# Patient Record
Sex: FEMALE | Race: WHITE | NOT HISPANIC OR LATINO | Employment: OTHER | URBAN - METROPOLITAN AREA
[De-identification: names, ages, dates, MRNs, and addresses within clinical notes are randomized per-mention and may not be internally consistent; named-entity substitution may affect disease eponyms.]

---

## 2017-01-11 ENCOUNTER — OFFICE VISIT (OUTPATIENT)
Dept: URGENT CARE | Facility: CLINIC | Age: 72
End: 2017-01-11
Payer: MEDICARE

## 2017-01-11 VITALS
HEIGHT: 63 IN | TEMPERATURE: 96.9 F | DIASTOLIC BLOOD PRESSURE: 78 MMHG | SYSTOLIC BLOOD PRESSURE: 124 MMHG | BODY MASS INDEX: 35.83 KG/M2 | OXYGEN SATURATION: 94 % | WEIGHT: 202.2 LBS | HEART RATE: 96 BPM | RESPIRATION RATE: 16 BRPM

## 2017-01-11 DIAGNOSIS — J22 ACUTE LOWER RESPIRATORY TRACT INFECTION: ICD-10-CM

## 2017-01-11 PROCEDURE — 99214 OFFICE O/P EST MOD 30 MIN: CPT | Performed by: NURSE PRACTITIONER

## 2017-01-11 RX ORDER — LEVOFLOXACIN 500 MG/1
500 TABLET, FILM COATED ORAL DAILY
Qty: 7 TAB | Refills: 0 | Status: SHIPPED | OUTPATIENT
Start: 2017-01-11 | End: 2017-01-18

## 2017-01-11 NOTE — PROGRESS NOTES
Chief Complaint   Patient presents with   • Cough     cough/ nasal congestion X 2 days       HISTORY OF PRESENT ILLNESS: Patient is a 71 y.o. female who presents today due to symptoms that started over one week ago but has been worsening over the past two days. Pt reports a productive cough without blood in sputum. Reports associated mild sore throat, nasal congestion, fatigue, mild shortness of breath, and body aches. Denies chest pain, fever, or wheezing. Denies h/o asthma/copd. Admits to history of CAP 5 years ago, is a former smoker. Has tried OTC cold medications without significant relief of symptoms. No recent ABX use. No other aggravating or alleviating factors.     Patient Active Problem List    Diagnosis Date Noted   • Painful urination 09/26/2016   • CKD (chronic kidney disease) stage 3, GFR 30-59 ml/min 08/01/2016   • Fatigue 08/01/2016   • Osteopenia 08/01/2016   • Vitamin B12 deficiency 08/01/2016   • Varicose veins 08/01/2016   • Seasonal allergies 08/01/2016   • UI (urinary incontinence) 08/01/2016   • Vitamin D deficiency 08/01/2016   • Epilepsy (HCC) 08/01/2016       Allergies:Review of patient's allergies indicates no known allergies.    Current Outpatient Prescriptions Ordered in [facility]   Medication Sig Dispense Refill   • Pseudoephedrine-Guaifenesin (MUCINEX D PO) Take  by mouth.     • phenytoin ER (DILANTIN) 100 MG Cap Take 4 Caps by mouth every day. 120 Cap 3   • fluticasone (FLONASE) 50 MCG/ACT nasal spray Spray 1 Spray in nose every day.     • Multiple Vitamin (MULTI-VITAMINS PO) Take  by mouth.     • Cholecalciferol (VITAMIN D PO) Take  by mouth.     • Cyanocobalamin (VITAMIN B12 PO) Take  by mouth.     • CALCIUM PO Take  by mouth.       No current Epic-ordered facility-administered medications on file.       Past Medical History   Diagnosis Date   • Seizure disorder    • CKD (chronic kidney disease) stage 3, GFR 30-59 ml/min 8/1/2016   • Osteopenia 8/1/2016   • Vitamin B12 deficiency  "2016   • Varicose veins 2016   • Seasonal allergies 2016   • UI (urinary incontinence) 2016   • Vitamin D deficiency 2016   • Epilepsy 2016       Social History   Substance Use Topics   • Smoking status: Never Smoker    • Smokeless tobacco: Never Used   • Alcohol Use: No       Family Status   Relation Status Death Age   • Sister Alive    • Father  90     LUNG CANCER   • Mother  39     STOMACH CANCER   • Sister  80     HEART ISSUES   • Brother  65     PANCREATIC CANCER     Family History   Problem Relation Age of Onset   • Breast Cancer Sister    • Other Paternal Aunt      EPILEPSY   • Lung Cancer Father    • Cancer Mother      STOMACH   • Heart Disease Sister    • Cancer Brother      PANCREATIC       ROS:  Review of Systems   Constitutional: Positive for subjective malaise, chills, fatigue. Negative for weight loss and fever.  HENT: Positive for congestion and sore throat. Negative for ear pain, nosebleeds, and neck pain.    Eyes: Negative for vision changes.   Cardiovascular: Negative for chest pain, palpitations, orthopnea and leg swelling.   Respiratory: Positive for cough with sputum production, shortness of breath. Negative for wheezing.   Gastrointestinal: Negative for abdominal pain, nausea, vomiting or diarrhea.   Skin: Negative for rash, diaphoresis.     Exam:  Blood pressure 124/78, pulse 96, temperature 36.1 °C (96.9 °F), resp. rate 16, height 1.6 m (5' 3\"), weight 91.717 kg (202 lb 3.2 oz), SpO2 94 %.  General: well-nourished, well-developed female in NAD  Head: normocephalic, atraumatic  Eyes: PERRLA, EOM within normal limits, no conjunctival injection, no scleral icterus, visual fields and acuity grossly intact.  Ears: normal shape and symmetry, no tenderness, no discharge. External canals are without any significant edema or erythema. Tympanic membranes are without any inflammation, no effusion. Gross auditory acuity is intact.  Nose: symmetrical " without tenderness, mild discharge, erythema present bilateral nares.  Mouth/Throat: reasonable hygiene, no exudates or tonsillar enlargement. Erythema present.   Neck: no masses, range of motion within normal limits, no tracheal deviation.  Lymph: mild cervical adenopathy. No supraclavicular adenopathy.   Neuro: alert and oriented. Cranial nerves 1-12 grossly intact.   Cardiovascular: regular rate and rhythm without murmurs, rubs, or gallops. No edema.   Pulmonary: no distress. Chest is symmetrical with respiration, no wheezes, crackles. Rhonchi RLL.   Musculoskeletal: appropriate muscle tone, gait is stable.  Skin: warm, dry, intact, no clubbing, no cyanosis.   Psych: appropriate mood, affect, judgement.         Assessment/Plan:  1. Acute lower respiratory tract infection  levofloxacin (LEVAQUIN) 500 MG tablet           I have offered the patient a chest x-ray today, but she has declined. Levaquin for suspected lower resp tract infection.   Rest, increase fluids, hand and respiratory hygiene.   Supportive care, differential diagnoses, and indications for immediate follow-up discussed with patient.   Pathogenesis of diagnosis discussed including typical length and natural progression.  Instructed to return to clinic or nearest emergency department for any change in condition, further concerns, or worsening of symptoms.  Patient states understanding of the plan of care and discharge instructions.  Instructed to make an appointment with their primary care provider in the next 3-7 days if not significantly improving and for further care.         Please note that this dictation was created using voice recognition software. I have made every reasonable attempt to correct obvious errors, but I expect that there are errors of grammar and possibly content that I did not discover before finalizing the note.      HUSSEIN Underwood.

## 2017-01-11 NOTE — MR AVS SNAPSHOT
"        Nancie Joyner   2017 8:15 AM   Office Visit   MRN: [id number]    Department:  [facility]   Dept Phone:  [phone]    Description:  Female : 1945   Provider:  MOUNA Cuadra           Reason for Visit     Cough cough/ nasal congestion X 2 days      Allergies as of 2017     No Known Allergies      You were diagnosed with     Acute lower respiratory tract infection   [496572]         Vital Signs     Blood Pressure Pulse Temperature Respirations Height Weight    124/78 mmHg 96 36.1 °C (96.9 °F) 16 1.6 m (5' 3\") 91.717 kg (202 lb 3.2 oz)    Body Mass Index Oxygen Saturation Smoking Status             35.83 kg/m2 94% Never Smoker          Basic Information     Date Of Birth Sex Race Ethnicity Preferred Language    1945 Female White Non- English      Your appointments     2017  8:00 AM   New Patient with Ioana Denise D.O.   [facility]    [address] [place] [id number] [phone]           Please bring Photo ID, Insurance Cards, All Medication Bottles and copies of any legal documents (such as Living Will, Power of ) If speaking a language besides English please bring an adult . Please arrive 30 minutes prior for check in and registration. You will be receiving a confirmation call a few days before your appointment from our automated call confirmation system.              Problem List              ICD-10-CM Priority Class Noted - Resolved    CKD (chronic kidney disease) stage 3, GFR 30-59 ml/min N18.3   2016 - Present    Fatigue R53.83   2016 - Present    Osteopenia M85.80   2016 - Present    Vitamin B12 deficiency E53.8   2016 - Present    Varicose veins I86.8   2016 - Present    Seasonal allergies J30.2   2016 - Present    UI (urinary incontinence) R32   2016 - Present    Vitamin D deficiency E55.9   2016 - Present    Epilepsy (HCC) G40.909   2016 - Present   " Painful urination R30.9   9/26/2016 - Present      Health Maintenance        Date Due Completion Dates    IMM DTaP/Tdap/Td Vaccine (1 - Tdap) 8/14/1962 ---    PAP SMEAR 8/14/1964 ---    COLONOSCOPY 8/14/1993 ---    IMM ZOSTER VACCINE 8/14/2003 ---    IMM PNEUMOCOCCAL 65+ (ADULT) LOW/MEDIUM RISK SERIES (1 of 2 - PCV13) 8/14/2008 ---    BONE DENSITY 11/7/2010 11/7/2005    IMM INFLUENZA (1) 9/1/2016 ---    MAMMOGRAM 7/28/2017 7/28/2016, 6/15/2015, 6/4/2014, 5/28/2013, 5/17/2012, 4/28/2011, 2/5/2010, 2/5/2010, 1/13/2009, 1/13/2009, 10/3/2006, 9/13/2005, 9/2/2004            Current Immunizations     No immunizations on file.      Below and/or attached are the medications your provider expects you to take. Review all of your home medications and newly ordered medications with your provider and/or pharmacist. Follow medication instructions as directed by your provider and/or pharmacist. Please keep your medication list with you and share with your provider. Update the information when medications are discontinued, doses are changed, or new medications (including over-the-counter products) are added; and carry medication information at all times in the event of emergency situations     Allergies:  No Known Allergies          Medications  Valid as of: January 11, 2017 -  8:34 AM    Generic Name Brand Name Tablet Size Instructions for use    Calcium   Take  by mouth.        Cholecalciferol   Take  by mouth.        Cyanocobalamin   Take  by mouth.        Fluticasone Propionate (Suspension) FLONASE 50 MCG/ACT Spray 1 Spray in nose every day.        Multiple Vitamin   Take  by mouth.        Phenytoin Sodium Extended (Cap) DILANTIN 100 MG Take 4 Caps by mouth every day.        Pseudoephedrine-Guaifenesin   Take  by mouth.        .                 Medicines prescribed today were sent to:     [facility] - [place], [place] - [address]    [address] [place] [address]    Phone: [phone] Fax: [phone]    Open 24 Hours?: No      [facility] - [place], [place] - [address] [facility]    [address] [facility] [address]    Phone: [phone] Fax: [phone]    Open 24 Hours?: No      Medication refill instructions:       If your prescription bottle indicates you have medication refills left, it is not necessary to call your provider’s office. Please contact your pharmacy and they will refill your medication.    If your prescription bottle indicates you do not have any refills left, you may request refills at any time through one of the following ways: The online [employer] system (except Urgent Care), by calling your provider’s office, or by asking your pharmacy to contact your provider’s office with a refill request. Medication refills are processed only during regular business hours and may not be available until the next business day. Your provider may request additional information or to have a follow-up visit with you prior to refilling your medication.   *Please Note: Medication refills are assigned a new Rx number when refilled electronically. Your pharmacy may indicate that no refills were authorized even though a new prescription for the same medication is available at the pharmacy. Please request the medicine by name with the pharmacy before contacting your provider for a refill.           MyChart Status: Patient Declined

## 2017-02-02 ENCOUNTER — OFFICE VISIT (OUTPATIENT)
Dept: MEDICAL GROUP | Facility: PHYSICIAN GROUP | Age: 72
End: 2017-02-02
Payer: MEDICARE

## 2017-02-02 VITALS
DIASTOLIC BLOOD PRESSURE: 68 MMHG | WEIGHT: 208 LBS | TEMPERATURE: 96.9 F | OXYGEN SATURATION: 94 % | BODY MASS INDEX: 36.86 KG/M2 | HEIGHT: 63 IN | HEART RATE: 74 BPM | SYSTOLIC BLOOD PRESSURE: 124 MMHG

## 2017-02-02 DIAGNOSIS — E55.9 VITAMIN D DEFICIENCY: ICD-10-CM

## 2017-02-02 DIAGNOSIS — G40.909 NONINTRACTABLE EPILEPSY WITHOUT STATUS EPILEPTICUS, UNSPECIFIED EPILEPSY TYPE (HCC): ICD-10-CM

## 2017-02-02 DIAGNOSIS — N18.30 CKD (CHRONIC KIDNEY DISEASE) STAGE 3, GFR 30-59 ML/MIN (HCC): ICD-10-CM

## 2017-02-02 DIAGNOSIS — Z78.0 MENOPAUSE: ICD-10-CM

## 2017-02-02 DIAGNOSIS — M85.80 OSTEOPENIA: ICD-10-CM

## 2017-02-02 PROCEDURE — 1036F TOBACCO NON-USER: CPT | Performed by: FAMILY MEDICINE

## 2017-02-02 PROCEDURE — G8432 DEP SCR NOT DOC, RNG: HCPCS | Performed by: FAMILY MEDICINE

## 2017-02-02 PROCEDURE — 0518F FALL PLAN OF CARE DOCD: CPT | Mod: 8P | Performed by: FAMILY MEDICINE

## 2017-02-02 PROCEDURE — 3288F FALL RISK ASSESSMENT DOCD: CPT | Performed by: FAMILY MEDICINE

## 2017-02-02 PROCEDURE — 99214 OFFICE O/P EST MOD 30 MIN: CPT | Performed by: FAMILY MEDICINE

## 2017-02-02 PROCEDURE — 1100F PTFALLS ASSESS-DOCD GE2>/YR: CPT | Performed by: FAMILY MEDICINE

## 2017-02-02 PROCEDURE — 4040F PNEUMOC VAC/ADMIN/RCVD: CPT | Performed by: FAMILY MEDICINE

## 2017-02-02 PROCEDURE — 3017F COLORECTAL CA SCREEN DOC REV: CPT | Mod: 8P | Performed by: FAMILY MEDICINE

## 2017-02-02 PROCEDURE — 3014F SCREEN MAMMO DOC REV: CPT | Performed by: FAMILY MEDICINE

## 2017-02-02 PROCEDURE — G8419 CALC BMI OUT NRM PARAM NOF/U: HCPCS | Performed by: FAMILY MEDICINE

## 2017-02-02 PROCEDURE — G8482 FLU IMMUNIZE ORDER/ADMIN: HCPCS | Performed by: FAMILY MEDICINE

## 2017-02-02 RX ORDER — PHENYTOIN SODIUM 100 MG/1
400 CAPSULE, EXTENDED RELEASE ORAL DAILY
Qty: 360 CAP | Refills: 3 | Status: SHIPPED | OUTPATIENT
Start: 2017-02-02 | End: 2017-10-10 | Stop reason: SDUPTHER

## 2017-02-02 NOTE — MR AVS SNAPSHOT
"        Nancie Joyner   2017 8:00 AM   Office Visit   MRN: [id number]    Department:  [facility]   Dept Phone:  [phone]    Description:  Female : 1945   Provider:  Ioana Denise D.O.           Reason for Visit     Establish Care           Allergies as of 2017     No Known Allergies      You were diagnosed with     Nonintractable epilepsy without status epilepticus, unspecified epilepsy type (CMS-HCC)   [3877283]       CKD (chronic kidney disease) stage 3, GFR 30-59 ml/min   [934805]       Osteopenia   [171589]       Vitamin D deficiency   [5879993]       Menopause   [100474]         Vital Signs     Blood Pressure Pulse Temperature Height Weight Body Mass Index    124/68 mmHg 74 36.1 °C (96.9 °F) 1.6 m (5' 3\") 94.348 kg (208 lb) 36.85 kg/m2    Oxygen Saturation Smoking Status                94% Former Smoker          Basic Information     Date Of Birth Sex Race Ethnicity Preferred Language    1945 Female White Non- English      Your appointments     Oct 09, 2017  8:20 AM   Established Patient with Ioana Denise D.O.   [facility]    [address] [place] [id number] [phone]           You will be receiving a confirmation call a few days before your appointment from our automated call confirmation system.              Problem List              ICD-10-CM Priority Class Noted - Resolved    CKD (chronic kidney disease) stage 3, GFR 30-59 ml/min N18.3   2016 - Present    Fatigue R53.83   2016 - Present    Osteopenia M85.80   2016 - Present    Vitamin B12 deficiency E53.8   2016 - Present    Varicose veins I86.8   2016 - Present    Seasonal allergies J30.2   2016 - Present    UI (urinary incontinence) R32   2016 - Present    Vitamin D deficiency E55.9   2016 - Present    Epilepsy (CMS-HCC) G40.909   2016 - Present    Painful urination R30.9   2016 - Present      Health Maintenance        Date Due " Completion Dates    IMM DTaP/Tdap/Td Vaccine (1 - Tdap) 8/14/1962 ---    COLONOSCOPY 8/14/1993 ---    BONE DENSITY 11/7/2010 11/7/2005    MAMMOGRAM 7/28/2017 7/28/2016, 6/15/2015, 6/4/2014, 5/28/2013, 5/17/2012, 4/28/2011, 2/5/2010, 2/5/2010, 1/13/2009, 1/13/2009, 10/3/2006, 9/13/2005, 9/2/2004    IMM PNEUMOCOCCAL 65+ (ADULT) LOW/MEDIUM RISK SERIES (2 of 2 - PPSV23) 11/30/2017 11/30/2016            Current Immunizations     13-VALENT PCV PREVNAR 11/30/2016    Influenza Vaccine Adult HD 9/14/2016, 10/14/2015    Influenza Vaccine Quad Inj (Pf) 10/2/2012    Influenza Vaccine Quad Inj (Preserved) 9/25/2014, 10/25/2013, 10/5/2011    SHINGLES VACCINE 11/30/2016      Below and/or attached are the medications your provider expects you to take. Review all of your home medications and newly ordered medications with your provider and/or pharmacist. Follow medication instructions as directed by your provider and/or pharmacist. Please keep your medication list with you and share with your provider. Update the information when medications are discontinued, doses are changed, or new medications (including over-the-counter products) are added; and carry medication information at all times in the event of emergency situations     Allergies:  No Known Allergies          Medications  Valid as of: February 02, 2017 -  8:21 AM    Generic Name Brand Name Tablet Size Instructions for use    Calcium   Take  by mouth.        Cholecalciferol   Take  by mouth.        Cyanocobalamin   Take  by mouth.        Fluticasone Propionate (Suspension) FLONASE 50 MCG/ACT Spray 1 Spray in nose every day.        Multiple Vitamin   Take  by mouth.        Phenytoin Sodium Extended (Cap) DILANTIN 100 MG Take 4 Caps by mouth every day.        .                 Medicines prescribed today were sent to:     [facility] - [place], [place] - [address]    [address] [place] [address]    Phone: [phone] Fax: [phone]    Open 24 Hours?: No    [facility] - [place], [place] - [address] [facility]    [address] [facility] [address]    Phone: [phone] Fax: [phone]    Open 24 Hours?: No      Medication refill instructions:       If your prescription bottle indicates you have medication refills left, it is not necessary to call your provider’s office. Please contact your pharmacy and they will refill your medication.    If your prescription bottle indicates you do not have any refills left, you may request refills at any time through one of the following ways: The online [employer] system (except Urgent Care), by calling your provider’s office, or by asking your pharmacy to contact your provider’s office with a refill request. Medication refills are processed only during regular business hours and may not be available until the next business day. Your provider may request additional information or to have a follow-up visit with you prior to refilling your medication.   *Please Note: Medication refills are assigned a new Rx number when refilled electronically. Your pharmacy may indicate that no refills were authorized even though a new prescription for the same medication is available at the pharmacy. Please request the medicine by name with the pharmacy before contacting your provider for a refill.        Your To Do List     Future Labs/Procedures Complete By Expires    COMP METABOLIC PANEL  As directed 2/3/2018    DILANTIN  As directed 2/2/2018    DS-BONE DENSITY STUDY (DEXA)  As directed 8/5/2017    VITAMIN D,25 HYDROXY  As directed 2/3/2018         pope[employer]pert Status: Patient Declined

## 2017-02-02 NOTE — PROGRESS NOTES
Subjective:   Nancie Joyner is a 71 y.o. female here today for epilepsy; osteopenia, low vit D; CKD    CKD (chronic kidney disease) stage 3, GFR 30-59 ml/min  Ongoing issue. Chart review shows that last blood work revealed an improvement in her GFR to 54. Evaluation of her current medications do not reveal any nephrotoxic medications. Patient also reports that she only uses ibuprofen infrequently for management of joint pain. She reports that usage is approximately once or twice a month.    Epilepsy  Long-standing history. Patient reports 100% compliance with medication; she is currently on Dilantin 400 mg daily. Patient states that she does not remember the last time she had a seizure.  is present and is questioning whether or not she needs to have her Dilantin level checked.    Osteopenia  Long-standing history. Patient currently is taking supplemental calcium and vitamin D. She denies any long bone or vertebral fractures. Patient reports that she cannot remember the last time she had a bone scan.    Vitamin D deficiency  Ongoing issue. Patient currently is taking supplemental vitamin D. Chart review shows the last vitamin D level was at 31 which is below level of 40 which is considered therapeutic.     Pt is here today to [facility]; she is present today with her .     Current medicines (including changes today)  Current Outpatient Prescriptions   Medication Sig Dispense Refill   • phenytoin ER (DILANTIN) 100 MG Cap Take 4 Caps by mouth every day. 360 Cap 3   • fluticasone (FLONASE) 50 MCG/ACT nasal spray Spray 1 Spray in nose every day.     • Multiple Vitamin (MULTI-VITAMINS PO) Take  by mouth.     • Cholecalciferol (VITAMIN D PO) Take  by mouth.     • Cyanocobalamin (VITAMIN B12 PO) Take  by mouth.     • CALCIUM PO Take  by mouth.       No current facility-administered medications for this visit.     She  has a past medical history of Seizure disorder (CMS-[facility]); CKD (chronic kidney disease) stage  "3, GFR 30-59 ml/min (8/1/2016); Osteopenia (8/1/2016); Vitamin B12 deficiency (8/1/2016); Varicose veins (8/1/2016); Seasonal allergies (8/1/2016); UI (urinary incontinence) (8/1/2016); Vitamin D deficiency (8/1/2016); and Epilepsy (CMS-HCC) (8/1/2016).    ROS   No chest pain, no shortness of breath, no abdominal pain       Objective:     Blood pressure 124/68, pulse 74, temperature 36.1 °C (96.9 °F), height 1.6 m (5' 3\"), weight 94.348 kg (208 lb), SpO2 94 %. Body mass index is 36.85 kg/(m^2).   Physical Exam:  Alert, oriented in no acute distress.  Eye contact is good, speech goal directed, affect calm  HEENT: conjunctiva non-injected, sclera non-icteric.  Pinna normal. TM pearly gray.   Oral mucous membranes pink and moist with no lesions.  Neck No adenopathy or masses in the neck or supraclavicular regions.  Lungs: clear to auscultation bilaterally with good excursion.  CV: regular rate and rhythm. Mild systolic ejection murmur noted  Abdomen: soft, nontender, No CVAT; obese  Ext: no edema, color normal, vascularity normal, temperature normal  Neuro: CN 2-12 grossly intact      Assessment and Plan:   The following treatment plan was discussed     1. Nonintractable epilepsy without status epilepticus, unspecified epilepsy type (CMS-HCC)  DILANTIN    Stable. Continue current medications; refill provided. Check Dilantin levels. Monitor   2. CKD (chronic kidney disease) stage 3, GFR 30-59 ml/min  COMP METABOLIC PANEL    Stable. Recheck GFR. Monitor for results   3. Osteopenia      Stable. Continue current supplementation; sent for repeat DEXA scan. Monitor for results   4. Vitamin D deficiency  VITAMIN D,25 HYDROXY    Uncontrolled. Continue current supplementation; recheck labs and monitor for results   5. Menopause  DS-BONE DENSITY STUDY (DEXA)    Stable. Continue current supplementation; sent for repeat DEXA scan. Monitor for results       Followup: Return in about 7 months (around 9/2/2017) for seizure, " Short.

## 2017-02-02 NOTE — ASSESSMENT & PLAN NOTE
Ongoing issue. Patient currently is taking supplemental vitamin D. Chart review shows the last vitamin D level was at 31 which is below level of 40 which is considered therapeutic.

## 2017-02-02 NOTE — ASSESSMENT & PLAN NOTE
Long-standing history. Patient currently is taking supplemental calcium and vitamin D. She denies any long bone or vertebral fractures. Patient reports that she cannot remember the last time she had a bone scan.

## 2017-02-02 NOTE — ASSESSMENT & PLAN NOTE
Long-standing history. Patient reports 100% compliance with medication; she is currently on Dilantin 400 mg daily. Patient states that she does not remember the last time she had a seizure.  is present and is questioning whether or not she needs to have her Dilantin level checked.

## 2017-02-02 NOTE — ASSESSMENT & PLAN NOTE
Ongoing issue. Chart review shows that last blood work revealed an improvement in her GFR to 54. Evaluation of her current medications do not reveal any nephrotoxic medications. Patient also reports that she only uses ibuprofen infrequently for management of joint pain. She reports that usage is approximately once or twice a month.

## 2017-03-03 ENCOUNTER — HOSPITAL ENCOUNTER (OUTPATIENT)
Dept: RADIOLOGY | Facility: MEDICAL CENTER | Age: 72
End: 2017-03-03
Attending: FAMILY MEDICINE
Payer: MEDICARE

## 2017-03-03 ENCOUNTER — TELEPHONE (OUTPATIENT)
Dept: MEDICAL GROUP | Facility: PHYSICIAN GROUP | Age: 72
End: 2017-03-03

## 2017-03-03 DIAGNOSIS — Z78.0 MENOPAUSE: ICD-10-CM

## 2017-03-03 PROCEDURE — 77080 DXA BONE DENSITY AXIAL: CPT

## 2017-03-03 NOTE — Clinical Note
March 3, 2017         Nancie Joyner  [address]  [place] [address]        Dear Nancie:      Below are the results from your recent visit:    Resulted Orders   DS-BONE DENSITY STUDY (DEXA)    Narrative    3/3/2017 12:33 PM    HISTORY/REASON FOR EXAM:  Postmenopausal, osteopenia    TECHNIQUE/EXAM DESCRIPTION AND NUMBER OF VIEWS:  Dual x-ray bone densitometry was performed from the L1 through L4 levels and from the proximal left femur utilizing the GE Prodigy unit.    COMPARISON:   None    FINDINGS:  The mean bone mineral density for the lumbar spine is 1.183 g/cm2, which corresponds to a T score of 0.0 and a Z score of 1.8.    The proximal left femur has a mean bone mineral density of 1.065 g/cm2, with a T score of 0.5 and a Z score of 2.1.      Impression    According to the World Health Organization classification, bone mineral density of this patient is normal.        10-year Probability of Fracture:  Major Osteoporotic     9.2%  Hip     1.3%  Population      [place] ()    Based on left femur neck BMD          INTERPRETING LOCATION:  [address] [place] [address], [address]     The test results show that the DEXA (bone) scan showed normal bone density.  Recommend repeat in 2 years.   Please continue all current medications/supplements for now. .    If you have any questions or concerns, please don't hesitate to call.        Sincerely,      Ioana Denise D.O.    Electronically Signed

## 2017-03-03 NOTE — TELEPHONE ENCOUNTER
----- Message from Ioana Denise D.O. sent at 3/3/2017  2:02 PM PST -----  Please advise pt that the DEXA (bone) scan showed normal bone density.  Recommend repeat in 2 years.  Please continue all current medications/supplements for now.    Ioana Denise D.O.

## 2017-05-16 ENCOUNTER — HOSPITAL ENCOUNTER (OUTPATIENT)
Dept: LAB | Facility: MEDICAL CENTER | Age: 72
End: 2017-05-16
Attending: FAMILY MEDICINE
Payer: MEDICARE

## 2017-05-16 DIAGNOSIS — E55.9 VITAMIN D DEFICIENCY: ICD-10-CM

## 2017-05-16 DIAGNOSIS — G40.909 NONINTRACTABLE EPILEPSY WITHOUT STATUS EPILEPTICUS, UNSPECIFIED EPILEPSY TYPE (HCC): ICD-10-CM

## 2017-05-16 DIAGNOSIS — N18.30 CKD (CHRONIC KIDNEY DISEASE) STAGE 3, GFR 30-59 ML/MIN (HCC): ICD-10-CM

## 2017-05-16 LAB
25(OH)D3 SERPL-MCNC: 33 NG/ML (ref 30–100)
ALBUMIN SERPL BCP-MCNC: 4.2 G/DL (ref 3.2–4.9)
ALBUMIN/GLOB SERPL: 1.4 G/DL
ALP SERPL-CCNC: 76 U/L (ref 30–99)
ALT SERPL-CCNC: 28 U/L (ref 2–50)
ANION GAP SERPL CALC-SCNC: 6 MMOL/L (ref 0–11.9)
AST SERPL-CCNC: 23 U/L (ref 12–45)
BILIRUB SERPL-MCNC: 0.3 MG/DL (ref 0.1–1.5)
BUN SERPL-MCNC: 19 MG/DL (ref 8–22)
CALCIUM SERPL-MCNC: 9.3 MG/DL (ref 8.5–10.5)
CHLORIDE SERPL-SCNC: 106 MMOL/L (ref 96–112)
CO2 SERPL-SCNC: 28 MMOL/L (ref 20–33)
CREAT SERPL-MCNC: 1.01 MG/DL (ref 0.5–1.4)
GFR SERPL CREATININE-BSD FRML MDRD: 54 ML/MIN/1.73 M 2
GLOBULIN SER CALC-MCNC: 3.1 G/DL (ref 1.9–3.5)
GLUCOSE SERPL-MCNC: 86 MG/DL (ref 65–99)
PHENYTOIN SERPL-MCNC: 8.9 UG/ML (ref 10–20)
POTASSIUM SERPL-SCNC: 4.6 MMOL/L (ref 3.6–5.5)
PROT SERPL-MCNC: 7.3 G/DL (ref 6–8.2)
SODIUM SERPL-SCNC: 140 MMOL/L (ref 135–145)

## 2017-05-16 PROCEDURE — 36415 COLL VENOUS BLD VENIPUNCTURE: CPT

## 2017-05-16 PROCEDURE — 80185 ASSAY OF PHENYTOIN TOTAL: CPT

## 2017-05-16 PROCEDURE — 80053 COMPREHEN METABOLIC PANEL: CPT

## 2017-05-16 PROCEDURE — 82306 VITAMIN D 25 HYDROXY: CPT

## 2017-05-22 ENCOUNTER — OFFICE VISIT (OUTPATIENT)
Dept: MEDICAL GROUP | Facility: PHYSICIAN GROUP | Age: 72
End: 2017-05-22
Payer: MEDICARE

## 2017-05-22 VITALS
BODY MASS INDEX: 36.5 KG/M2 | WEIGHT: 206 LBS | OXYGEN SATURATION: 94 % | HEIGHT: 63 IN | HEART RATE: 80 BPM | RESPIRATION RATE: 16 BRPM | TEMPERATURE: 97.7 F | DIASTOLIC BLOOD PRESSURE: 82 MMHG | SYSTOLIC BLOOD PRESSURE: 118 MMHG

## 2017-05-22 DIAGNOSIS — J30.1 SEASONAL ALLERGIC RHINITIS DUE TO POLLEN: ICD-10-CM

## 2017-05-22 DIAGNOSIS — G40.802 OTHER EPILEPSY WITHOUT STATUS EPILEPTICUS, NOT INTRACTABLE (HCC): Primary | ICD-10-CM

## 2017-05-22 PROCEDURE — G8419 CALC BMI OUT NRM PARAM NOF/U: HCPCS | Performed by: NURSE PRACTITIONER

## 2017-05-22 PROCEDURE — 1100F PTFALLS ASSESS-DOCD GE2>/YR: CPT | Performed by: NURSE PRACTITIONER

## 2017-05-22 PROCEDURE — 1036F TOBACCO NON-USER: CPT | Performed by: NURSE PRACTITIONER

## 2017-05-22 PROCEDURE — 0518F FALL PLAN OF CARE DOCD: CPT | Mod: 8P | Performed by: NURSE PRACTITIONER

## 2017-05-22 PROCEDURE — 3288F FALL RISK ASSESSMENT DOCD: CPT | Performed by: NURSE PRACTITIONER

## 2017-05-22 PROCEDURE — 3014F SCREEN MAMMO DOC REV: CPT | Performed by: NURSE PRACTITIONER

## 2017-05-22 PROCEDURE — G8432 DEP SCR NOT DOC, RNG: HCPCS | Performed by: NURSE PRACTITIONER

## 2017-05-22 PROCEDURE — 4040F PNEUMOC VAC/ADMIN/RCVD: CPT | Performed by: NURSE PRACTITIONER

## 2017-05-22 PROCEDURE — 99214 OFFICE O/P EST MOD 30 MIN: CPT | Performed by: NURSE PRACTITIONER

## 2017-05-22 PROCEDURE — 3017F COLORECTAL CA SCREEN DOC REV: CPT | Mod: 8P | Performed by: NURSE PRACTITIONER

## 2017-05-22 RX ORDER — FLUTICASONE PROPIONATE 50 MCG
1-2 SPRAY, SUSPENSION (ML) NASAL DAILY
Qty: 16 G | Refills: 3 | Status: SHIPPED | OUTPATIENT
Start: 2017-05-22 | End: 2017-10-23 | Stop reason: SDUPTHER

## 2017-05-22 NOTE — MR AVS SNAPSHOT
"        Nancie Joyner   2017 11:20 AM   Office Visit   MRN: [id number]    Department:  [facility]   Dept Phone:  [phone]    Description:  Female : 1945   Provider:  LEO Ashraf           Reason for Visit     Seizure pt states she does have epilepsy    Nasal Congestion           Allergies as of 2017     No Known Allergies      You were diagnosed with     Other epilepsy without status epilepticus, not intractable   [9196036]  -  Primary     Seasonal allergic rhinitis due to pollen   [[id number]]         Vital Signs     Blood Pressure Pulse Temperature Respirations Height Weight    118/82 mmHg 80 36.5 °C (97.7 °F) 16 1.6 m (5' 3\") 93.441 kg (206 lb)    Body Mass Index Oxygen Saturation Smoking Status             36.50 kg/m2 94% Former Smoker         Basic Information     Date Of Birth Sex Race Ethnicity Preferred Language    1945 Female White Non- English      Your appointments     Oct 09, 2017  8:20 AM   Established Patient with Ioana Denise D.O.   [facility]    [address] [place] [id number] [phone]           You will be receiving a confirmation call a few days before your appointment from our automated call confirmation system.              Problem List              ICD-10-CM Priority Class Noted - Resolved    CKD (chronic kidney disease) stage 3, GFR 30-59 ml/min N18.3   2016 - Present    Fatigue R53.83   2016 - Present    Osteopenia M85.80   2016 - Present    Vitamin B12 deficiency E53.8   2016 - Present    Varicose veins I86.8   2016 - Present    Seasonal allergies J30.2   2016 - Present    UI (urinary incontinence) R32   2016 - Present    Vitamin D deficiency E55.9   2016 - Present    Epilepsy (CMS-HCC) G40.909   2016 - Present      Health Maintenance        Date Due Completion Dates    IMM DTaP/Tdap/Td Vaccine (1 - Tdap) 1962 ---    COLONOSCOPY 1993 ---    MAMMOGRAM " 7/28/2017 7/28/2016, 6/15/2015, 6/4/2014, 5/28/2013, 5/17/2012, 4/28/2011, 2/5/2010, 2/5/2010, 1/13/2009, 1/13/2009, 10/3/2006, 9/13/2005, 9/2/2004    IMM PNEUMOCOCCAL 65+ (ADULT) LOW/MEDIUM RISK SERIES (2 of 2 - PPSV23) 11/30/2017 11/30/2016    BONE DENSITY 3/3/2022 3/3/2017, 11/7/2005            Current Immunizations     13-VALENT PCV PREVNAR 11/30/2016    Influenza Vaccine Adult HD 9/14/2016, 10/14/2015    Influenza Vaccine Quad Inj (Pf) 10/2/2012    Influenza Vaccine Quad Inj (Preserved) 9/25/2014, 10/25/2013, 10/5/2011    SHINGLES VACCINE 11/30/2016      Below and/or attached are the medications your provider expects you to take. Review all of your home medications and newly ordered medications with your provider and/or pharmacist. Follow medication instructions as directed by your provider and/or pharmacist. Please keep your medication list with you and share with your provider. Update the information when medications are discontinued, doses are changed, or new medications (including over-the-counter products) are added; and carry medication information at all times in the event of emergency situations     Allergies:  No Known Allergies          Medications  Valid as of: May 22, 2017 - 11:27 AM    Generic Name Brand Name Tablet Size Instructions for use    Calcium   Take  by mouth.        Cholecalciferol   Take  by mouth.        Cyanocobalamin   Take  by mouth.        Fluticasone Propionate (Suspension) FLONASE 50 MCG/ACT Spray 1-2 Sprays in nose every day.        Multiple Vitamin   Take  by mouth.        Phenytoin Sodium Extended (Cap) DILANTIN 100 MG Take 4 Caps by mouth every day.        .                 Medicines prescribed today were sent to:     [id number] - [place], [place] - [address] [facility]    [address] [address]    Phone: [phone] Fax: [phone]    Open 24 Hours?: No    [facility] - [place], [place] - [address] [facility]    [address] [facility] [address]     Phone: [phone] Fax: [phone]    Open 24 Hours?: No      Medication refill instructions:       If your prescription bottle indicates you have medication refills left, it is not necessary to call your provider’s office. Please contact your pharmacy and they will refill your medication.    If your prescription bottle indicates you do not have any refills left, you may request refills at any time through one of the following ways: The online [employer] system (except Urgent Care), by calling your provider’s office, or by asking your pharmacy to contact your provider’s office with a refill request. Medication refills are processed only during regular business hours and may not be available until the next business day. Your provider may request additional information or to have a follow-up visit with you prior to refilling your medication.   *Please Note: Medication refills are assigned a new Rx number when refilled electronically. Your pharmacy may indicate that no refills were authorized even though a new prescription for the same medication is available at the pharmacy. Please request the medicine by name with the pharmacy before contacting your provider for a refill.        Your To Do List     Future Labs/Procedures Complete By Expires    DILANTIN  As directed 5/22/2018      Referral     A referral request has been sent to our patient care coordination department. Please allow 3-5 business days for us to process this request and contact you either by phone or mail. If you do not hear from us by the 5th business day, please call us at [phone].           [email or web address] Status: Patient Declined

## 2017-05-22 NOTE — PROGRESS NOTES
"Chief Complaint   Patient presents with   • Seizure     pt states she does have epilepsy   • Nasal Congestion       HISTORY OF PRESENT ILLNESS: Patient is a 71 y.o. female established patient who presents today to discuss the following.  She is accompanied by her .  She is a patient of Dr. Denise, this is her first visit with myself.    1. Other epilepsy without status epilepticus, not intractable  Patient states that she was diagnosed with epilepsy over 20 years ago.  She has been prescribed Dilantin and has been taking it for several years.  She reports \"mild seizure activity\" over the past couple of weeks.  Recent Dilantin levels reveal slightly low level at 8.9.  Patient was taking 400 mg at the time of the blood draw, however  states that they increased it to 500mg 4 days ago.  She was taking 500 mg in the past, but the dose was decreased when she lost a significant amount of weight.  The patient attributes her recent increase in seizure activity to sleep deprivation and nasal congestion.  Patient is not under the care of a neurologist.  She denies any further seizure activity level since the increase in the Dilantin.  Patient denies loss of consciousness during her seizures.  Describes an electric shock through her brain and arms that causes her to shake.    2. Seasonal allergic rhinitis due to pollen  Patient does suffer from seasonal allergies.  She reports significant nasal congestion over the past several days.  She has been using and saline rinse with only mild, temporary relief.  She has been prescribed Flonase in the past, but states that she ran out of the prescription.  She denies any significant sneezing, itchy, watery eyes or cough.    Allergies:Review of patient's allergies indicates no known allergies.    Current Outpatient Prescriptions Ordered in [facility]   Medication Sig Dispense Refill   • fluticasone (FLONASE) 50 MCG/ACT nasal spray Spray 1-2 Sprays in nose every day. 16 g 3   • " phenytoin ER (DILANTIN) 100 MG Cap Take 4 Caps by mouth every day. 360 Cap 3   • Multiple Vitamin (MULTI-VITAMINS PO) Take  by mouth.     • Cholecalciferol (VITAMIN D PO) Take  by mouth.     • Cyanocobalamin (VITAMIN B12 PO) Take  by mouth.     • CALCIUM PO Take  by mouth.       No current Epic-ordered facility-administered medications on file.       Past Medical History   Diagnosis Date   • Seizure disorder (CMS-HCC)    • CKD (chronic kidney disease) stage 3, GFR 30-59 ml/min 2016   • Osteopenia 2016   • Vitamin B12 deficiency 2016   • Varicose veins 2016   • Seasonal allergies 2016   • UI (urinary incontinence) 2016   • Vitamin D deficiency 2016   • Epilepsy (CMS-HCC) 2016       Social History   Substance Use Topics   • Smoking status: Former Smoker -- 0.50 packs/day for 2 years     Types: Cigarettes   • Smokeless tobacco: Never Used   • Alcohol Use: No       Family Status   Relation Status Death Age   • Sister Alive    • Father  90     LUNG CANCER   • Mother  39     STOMACH CANCER   • Sister Alive    • Sister  80     HEART ISSUES   • Brother  65     PANCREATIC CANCER     Family History   Problem Relation Age of Onset   • Breast Cancer Sister    • Cancer Sister      sinus   • Other Paternal Aunt      EPILEPSY   • Lung Cancer Father    • Cancer Mother      STOMACH   • Heart Disease Sister    • Cancer Brother      PANCREATIC   • Heart Disease Sister        ROS: see above    Review of Systems   Constitutional: Negative for fever, chills, weight loss and malaise/fatigue.   HENT: Negative for ear pain, nosebleeds, congestion, sore throat and neck pain.    Eyes: Negative for blurred vision.   Respiratory: Negative for cough, sputum production, shortness of breath and wheezing.    Cardiovascular: Negative for chest pain, palpitations, orthopnea and leg swelling.   Gastrointestinal: Negative for heartburn, nausea, vomiting and abdominal pain.   Genitourinary:  "Negative for dysuria, urgency and frequency.   Musculoskeletal: Negative for myalgias, back pain and joint pain.   Skin: Negative for rash and itching.   Neurological: Negative for dizziness, tingling, tremors, sensory change, focal weakness and headaches.   Endo/Heme/Allergies: Does not bruise/bleed easily.   Psychiatric/Behavioral: Negative for depression, suicidal ideas and memory loss.  The patient is not nervous/anxious and does not have insomnia.        Exam:  Blood pressure 118/82, pulse 80, temperature 36.5 °C (97.7 °F), resp. rate 16, height 1.6 m (5' 3\"), weight 93.441 kg (206 lb), SpO2 94 %.  General:  Well nourished, well developed female in NAD  Head is grossly normal.  Neck: Thyroid is not enlarged.  Pulmonary: Normal effort.   Cardiovascular: Regular rate.   Extremities: no clubbing, cyanosis, or edema.  Psych:  Mood and affect are normal.  Answering questions appropriately with good eye contact.      Please note that this dictation was created using voice recognition software. I have made every reasonable attempt to correct obvious errors, but I expect that there are errors of grammar and possibly content that I did not discover before finalizing the note.    Assessment/Plan:    1. Other epilepsy without status epilepticus, not intractable  REFERRAL TO NEUROLOGY    DILANTIN   2. Seasonal allergic rhinitis due to pollen  fluticasone (FLONASE) 50 MCG/ACT nasal spray        1.  Encourage daily use of Flonase to help reduce nasal congestion, in hopes of improving sleep quality.  2.  Continue with Dilantin at 500 mg daily, repeat Dilantin levels in 1-2 weeks.  3.  Consider establishing with a neurologist if seizure activity continues despite the increase in the Dilantin.  4.  Follow-up with primary care as scheduled, sooner if labs warrant further discussion.    "

## 2017-05-22 NOTE — Clinical Note
[facility]  Ioana Denise D.O.  [address]  [place] [address]  Fax: [phone]   Authorization for Release/Disclosure of   Protected Health Information   Name: NANCIE MCCLENDON : 1945 SSN: [id number]   Address: [address] [place] [address] Phone:    [phone] (home)    I authorize the entity listed below to release/disclose the PHI below to:   [facility]/Ioana Denise D.O. and LEO Ashraf   Provider or Entity Name:  [facility]   Address   City, State, Zip   Phone:      Fax:     Reason for request: continuity of care   Information to be released:    [X] LAST COLONOSCOPY,  including any PATH REPORT and follow-up  [  ] LAST FIT/COLOGUARD RESULT [  ] LAST DEXA  [  ] LAST MAMMOGRAM  [  ] LAST PAP  [  ] LAST LABS [  ] RETINA EXAM REPORT  [  ] IMMUNIZATION RECORDS  [  ] Release all info      [  ] Check here and initial the line next to each item to release ALL health information INCLUDING  _____ Care and treatment for drug and / or alcohol abuse  _____ HIV testing, infection status, or AIDS  _____ Genetic Testing    DATES OF SERVICE OR TIME PERIOD TO BE DISCLOSED: _____________  I understand and acknowledge that:  * This Authorization may be revoked at any time by you in writing, except if your health information has already been used or disclosed.  * Your health information that will be used or disclosed as a result of you signing this authorization could be re-disclosed by the recipient. If this occurs, your re-disclosed health information may no longer be protected by State or Federal laws.  * You may refuse to sign this Authorization. Your refusal will not affect your ability to obtain treatment.  * This Authorization becomes effective upon signing and will  on (date) __________.      If no date is indicated, this Authorization will  one (1) year from the signature date.    Name: Nancie Mcclendon     Date:     2017       PLEASE FAX REQUESTED RECORDS  BACK TO: [phone]

## 2017-06-12 ENCOUNTER — HOSPITAL ENCOUNTER (OUTPATIENT)
Dept: LAB | Facility: MEDICAL CENTER | Age: 72
End: 2017-06-12
Attending: STUDENT IN AN ORGANIZED HEALTH CARE EDUCATION/TRAINING PROGRAM
Payer: MEDICARE

## 2017-06-12 DIAGNOSIS — G40.909 NONINTRACTABLE EPILEPSY WITHOUT STATUS EPILEPTICUS (HCC): ICD-10-CM

## 2017-06-12 LAB — PHENYTOIN SERPL-MCNC: 11.5 UG/ML (ref 10–20)

## 2017-06-12 PROCEDURE — 80185 ASSAY OF PHENYTOIN TOTAL: CPT

## 2017-06-12 PROCEDURE — 36415 COLL VENOUS BLD VENIPUNCTURE: CPT

## 2017-06-14 ENCOUNTER — TELEPHONE (OUTPATIENT)
Dept: MEDICAL GROUP | Facility: PHYSICIAN GROUP | Age: 72
End: 2017-06-14

## 2017-06-14 NOTE — TELEPHONE ENCOUNTER
----- Message from MOUNA Solorzano sent at 6/13/2017  9:27 PM PDT -----  Please advise patient that dilantin level is in therapeutic range on current dose. Follow up with neuro as scheduled in August      MOUNA Solorzano

## 2017-06-14 NOTE — Clinical Note
June 14, 2017         Nancie Joyner  [address] [place] [address]        Dear Nancie:      Your dilantin level is in therapeutic range on current dose. Follow up with neuro as scheduled in August Resulted Orders   DILANTIN   Result Value Ref Range    Phenytoin 11.5 10.0 - 20.0 ug/mL         If you have any questions or concerns, please don't hesitate to call.        Sincerely,      Ioana Denise D.O.    Electronically Signed

## 2017-07-17 ENCOUNTER — HOSPITAL ENCOUNTER (OUTPATIENT)
Dept: RADIOLOGY | Facility: MEDICAL CENTER | Age: 72
End: 2017-07-17
Attending: FAMILY MEDICINE
Payer: MEDICARE

## 2017-07-17 ENCOUNTER — OFFICE VISIT (OUTPATIENT)
Dept: MEDICAL GROUP | Facility: PHYSICIAN GROUP | Age: 72
End: 2017-07-17
Payer: MEDICARE

## 2017-07-17 ENCOUNTER — TELEPHONE (OUTPATIENT)
Dept: MEDICAL GROUP | Facility: PHYSICIAN GROUP | Age: 72
End: 2017-07-17

## 2017-07-17 VITALS
HEIGHT: 63 IN | SYSTOLIC BLOOD PRESSURE: 140 MMHG | HEART RATE: 74 BPM | RESPIRATION RATE: 18 BRPM | BODY MASS INDEX: 36.5 KG/M2 | TEMPERATURE: 97.9 F | WEIGHT: 206 LBS | OXYGEN SATURATION: 95 % | DIASTOLIC BLOOD PRESSURE: 80 MMHG

## 2017-07-17 DIAGNOSIS — M79.605 LEFT LEG PAIN: ICD-10-CM

## 2017-07-17 PROCEDURE — 93971 EXTREMITY STUDY: CPT | Mod: LT

## 2017-07-17 PROCEDURE — 99213 OFFICE O/P EST LOW 20 MIN: CPT | Performed by: FAMILY MEDICINE

## 2017-07-17 ASSESSMENT — PAIN SCALES - GENERAL: PAINLEVEL: 9=SEVERE PAIN

## 2017-07-17 NOTE — ASSESSMENT & PLAN NOTE
Patient is here today with complaint of left leg pain that is going on for about a month. The pain has been worse recently and is located in the lower calf and lateral aspect of the left leg. She denies any recent injury. She does have significant history of varicose veins bilateral lower extremities and has associated swelling in both legs. Pain is worse with walking, it is dull in character. She notes that the skin on her left leg was also warm to touch. She is not taking any medication for pain because she generally does not like taking medications. She does not have any history of blood clots.

## 2017-07-17 NOTE — TELEPHONE ENCOUNTER
Reviewed ultrasound results.Please call and inform patient that her left leg ultrasound did not show any blood clots.Therefore advise her to continue leg elevation, compression bandage and tylenol and follow up if no improvement or if worsens.    Gillian Caraballo M.D.

## 2017-07-17 NOTE — MR AVS SNAPSHOT
"Nancie Joyner   2017 11:20 AM   Office Visit   MRN: [id number]    Department:  [facility]   Dept Phone:  [phone]    Description:  Female : 1945   Provider:  Gillian Caraballo M.D.           Reason for Visit     Leg Swelling bilaterial x 1month    Shortness of Breath x 1 month      Allergies as of 2017     No Known Allergies      You were diagnosed with     Left leg pain   [[id number]]         Vital Signs     Blood Pressure Pulse Temperature Respirations Height Weight    140/80 mmHg 74 36.6 °C (97.9 °F) 18 1.6 m (5' 2.99\") 93.441 kg (206 lb)    Body Mass Index Oxygen Saturation Smoking Status             36.50 kg/m2 95% Former Smoker         Basic Information     Date Of Birth Sex Race Ethnicity Preferred Language    1945 Female White Non- English      Your appointments     2017  2:30 PM   US EXTREMITY (30) A with VISTA [address]   [facility]    [address] [place] [id number] [phone]            Aug 21, 2017  9:00 AM   New Patient with LEO Elise   [facility] (--)    [address], [address]  [facility] [id number] [phone]           Please bring Photo ID, Insurance Cards, All Medication Bottles and copies of any legal documents (such as Living Will, Power of ) If speaking a language besides English please bring an adult . Please arrive 30 minutes prior for check in and registration. You will be receiving a confirmation call a few days before your appointment from our automated call confirmation system.            Oct 09, 2017  8:20 AM   Established Patient with Ioana Denise D.O.   [facility]    [address] [place] [id number] [phone]           You will be receiving a confirmation call a few days before your appointment from our automated call confirmation system.              Problem List              ICD-10-CM Priority Class Noted - Resolved   " CKD (chronic kidney disease) stage 3, GFR 30-59 ml/min N18.3   8/1/2016 - Present    Fatigue R53.83   8/1/2016 - Present    Osteopenia M85.80   8/1/2016 - Present    Vitamin B12 deficiency E53.8   8/1/2016 - Present    Varicose veins I86.8   8/1/2016 - Present    Seasonal allergies J30.2   8/1/2016 - Present    UI (urinary incontinence) R32   8/1/2016 - Present    Vitamin D deficiency E55.9   8/1/2016 - Present    Epilepsy (CMS-HCC) G40.909   8/1/2016 - Present    Left leg pain M79.605   7/17/2017 - Present      Health Maintenance        Date Due Completion Dates    IMM DTaP/Tdap/Td Vaccine (1 - Tdap) 8/14/1962 ---    MAMMOGRAM 7/28/2017 7/28/2016, 6/15/2015, 6/4/2014, 5/28/2013, 5/17/2012, 4/28/2011, 2/5/2010, 2/5/2010, 1/13/2009, 1/13/2009, 10/3/2006, 9/13/2005, 9/2/2004    IMM INFLUENZA (1) 9/1/2017 9/14/2016, 10/14/2015, 9/25/2014, 10/25/2013, 10/2/2012, 10/5/2011    IMM PNEUMOCOCCAL 65+ (ADULT) LOW/MEDIUM RISK SERIES (2 of 2 - PPSV23) 11/30/2017 11/30/2016    BONE DENSITY 3/3/2022 3/3/2017, 11/7/2005    COLONOSCOPY 8/4/2024 8/4/2014            Current Immunizations     13-VALENT PCV PREVNAR 11/30/2016    Influenza Vaccine Adult HD 9/14/2016, 10/14/2015    Influenza Vaccine Quad Inj (Pf) 10/2/2012    Influenza Vaccine Quad Inj (Preserved) 9/25/2014, 10/25/2013, 10/5/2011    SHINGLES VACCINE 11/30/2016      Below and/or attached are the medications your provider expects you to take. Review all of your home medications and newly ordered medications with your provider and/or pharmacist. Follow medication instructions as directed by your provider and/or pharmacist. Please keep your medication list with you and share with your provider. Update the information when medications are discontinued, doses are changed, or new medications (including over-the-counter products) are added; and carry medication information at all times in the event of emergency situations     Allergies:  No Known Allergies          Medications   Valid as of: July 17, 2017 - 11:39 AM    Generic Name Brand Name Tablet Size Instructions for use    Calcium   Take  by mouth.        Cholecalciferol   Take  by mouth.        Cyanocobalamin   Take  by mouth.        Fluticasone Propionate (Suspension) FLONASE 50 MCG/ACT Spray 1-2 Sprays in nose every day.        Multiple Vitamin   Take  by mouth.        Phenytoin Sodium Extended (Cap) DILANTIN 100 MG Take 4 Caps by mouth every day.        .                 Medicines prescribed today were sent to:     [facility] - [place], [place] - [address]    [address] [facility] [place] [address]    Phone: [phone] Fax: [phone]    Open 24 Hours?: No    [facility] - [place], [place] - [address] [place], [place] [address] [facility] [address]    Phone: [phone] Fax: [phone]    Open 24 Hours?: No      Medication refill instructions:       If your prescription bottle indicates you have medication refills left, it is not necessary to call your provider’s office. Please contact your pharmacy and they will refill your medication.    If your prescription bottle indicates you do not have any refills left, you may request refills at any time through one of the following ways: The online [employer] system (except Urgent Care), by calling your provider’s office, or by asking your pharmacy to contact your provider’s office with a refill request. Medication refills are processed only during regular business hours and may not be available until the next business day. Your provider may request additional information or to have a follow-up visit with you prior to refilling your medication.   *Please Note: Medication refills are assigned a new Rx number when refilled electronically. Your pharmacy may indicate that no refills were authorized even though a new prescription for the same medication is available at the pharmacy. Please request the medicine by name with the pharmacy before contacting your provider for a  refill.        Your To Do List     Future Labs/Procedures Complete By Expires    US-EXTREMITY VENOUS UNILATERAL-LOWER LEFT  As directed 7/17/2018         MyChart Status: Patient Declined

## 2017-07-17 NOTE — PROGRESS NOTES
"Subjective:   Nancie Joyner is a 71 y.o. female here today for left leg pain    Left leg pain  Patient is here today with complaint of left leg pain that is going on for about a month. The pain has been worse recently and is located in the lower calf and lateral aspect of the left leg. She denies any recent injury. She does have significant history of varicose veins bilateral lower extremities and has associated swelling in both legs. Pain is worse with walking, it is dull in character. She notes that the skin on her left leg was also warm to touch. She is not taking any medication for pain because she generally does not like taking medications. She does not have any history of blood clots.         Current medicines (including changes today)  Current Outpatient Prescriptions   Medication Sig Dispense Refill   • fluticasone (FLONASE) 50 MCG/ACT nasal spray Spray 1-2 Sprays in nose every day. 16 g 3   • phenytoin ER (DILANTIN) 100 MG Cap Take 4 Caps by mouth every day. 360 Cap 3   • Multiple Vitamin (MULTI-VITAMINS PO) Take  by mouth.     • Cholecalciferol (VITAMIN D PO) Take  by mouth.     • Cyanocobalamin (VITAMIN B12 PO) Take  by mouth.     • CALCIUM PO Take  by mouth.       No current facility-administered medications for this visit.     She  has a past medical history of Seizure disorder (CMS-HCC); CKD (chronic kidney disease) stage 3, GFR 30-59 ml/min (8/1/2016); Osteopenia (8/1/2016); Vitamin B12 deficiency (8/1/2016); Varicose veins (8/1/2016); Seasonal allergies (8/1/2016); UI (urinary incontinence) (8/1/2016); Vitamin D deficiency (8/1/2016); and Epilepsy (CMS-HCC) (8/1/2016).    ROS   No chest pain, no shortness of breath, no abdominal pain       Objective:     Blood pressure 140/80, pulse 74, temperature 36.6 °C (97.9 °F), resp. rate 18, height 1.6 m (5' 2.99\"), weight 93.441 kg (206 lb), SpO2 95 %. Body mass index is 36.5 kg/(m^2).     PHYSICAL EXAM     GEN: Alert and oriented,well appearing, no acute " distress  SKIN: warm, dry to touch  PSYCH: mood and affect normal, judgement normal  EYES: Conjunctiva clear, lids normal,pupils equal round and reactive  ENMT: Normal external nose and ears,EACs normal appearing, TM pearly gray with normal light reflex bilaterally,nasal mucosa and turbinates normal appearing without erythema or edema, lips without lesions,good dentition,oropharynx clear  Neck : Trachea midline, no masses or swelling, no thyromegaly  LYMPHATIC : No cervical or supraclavicular lymphadenopathy  RESPIRATORY : Unlabored respiratory effort, no distress noted, clear to auscultation bilaterally, no wheeze, rhonchi, crackles  CARDIOVASCULAR: RRR, S1 S2 normal, 2+ b/l pedal edema  GI: Soft, Non tender, No rebound or guarding, no hepatosplenomegaly, no masses  MUSCULOSKELETAL : Normal gait and stance, no obvious abnormalities   NEURO: No overt focal neurologic deficits,sensation intact  EXT : varicose veins in b/l lower legs, + calf tenderness, no erythema, or induration, Jessie's sign positive      Assessment and Plan:   The following treatment plan was discussed    1. Left leg pain  New problem  Ordered US venous doppler to R/O DVT  If ultrasound negative, advised patient to keep legs elevated 3 times a day, use compression stockings, Tylenol as needed for pain  Monitor symptoms and if not improving or worsening follow-up in 2 weeks or earlier  - US-EXTREMITY VENOUS UNILATERAL-LOWER LEFT; Future      Followup: Return in about 2 weeks (around 7/31/2017), or if symptoms worsen or fail to improve.         Please note that this dictation was created using voice recognition software. I have made every reasonable attempt to correct obvious errors, but I expect that there are errors of grammar and possibly content that I did not discover before finalizing the note.

## 2017-07-20 ENCOUNTER — APPOINTMENT (OUTPATIENT)
Dept: MEDICAL GROUP | Facility: PHYSICIAN GROUP | Age: 72
End: 2017-07-20
Payer: MEDICARE

## 2017-10-10 ENCOUNTER — OFFICE VISIT (OUTPATIENT)
Dept: MEDICAL GROUP | Facility: PHYSICIAN GROUP | Age: 72
End: 2017-10-10
Payer: MEDICARE

## 2017-10-10 VITALS
DIASTOLIC BLOOD PRESSURE: 94 MMHG | TEMPERATURE: 98.7 F | RESPIRATION RATE: 16 BRPM | SYSTOLIC BLOOD PRESSURE: 122 MMHG | OXYGEN SATURATION: 94 % | HEART RATE: 88 BPM | BODY MASS INDEX: 37.91 KG/M2 | WEIGHT: 206 LBS | HEIGHT: 62 IN

## 2017-10-10 DIAGNOSIS — G89.29 CHRONIC PAIN OF BOTH KNEES: ICD-10-CM

## 2017-10-10 DIAGNOSIS — L03.316 CELLULITIS OF UMBILICUS: ICD-10-CM

## 2017-10-10 DIAGNOSIS — N18.30 CKD (CHRONIC KIDNEY DISEASE) STAGE 3, GFR 30-59 ML/MIN (HCC): ICD-10-CM

## 2017-10-10 DIAGNOSIS — G40.802 OTHER EPILEPSY WITHOUT STATUS EPILEPTICUS, NOT INTRACTABLE (HCC): ICD-10-CM

## 2017-10-10 DIAGNOSIS — M25.561 CHRONIC PAIN OF BOTH KNEES: ICD-10-CM

## 2017-10-10 DIAGNOSIS — M25.562 CHRONIC PAIN OF BOTH KNEES: ICD-10-CM

## 2017-10-10 PROCEDURE — 99214 OFFICE O/P EST MOD 30 MIN: CPT | Performed by: FAMILY MEDICINE

## 2017-10-10 RX ORDER — PHENYTOIN SODIUM 100 MG/1
500 CAPSULE, EXTENDED RELEASE ORAL DAILY
Qty: 360 CAP | Refills: 3
Start: 2017-10-10 | End: 2017-11-21 | Stop reason: SDUPTHER

## 2017-10-10 RX ORDER — NYSTATIN 100000 U/G
CREAM TOPICAL
Qty: 1 TUBE | Refills: 0 | Status: SHIPPED | OUTPATIENT
Start: 2017-10-10 | End: 2018-02-17

## 2017-10-10 RX ORDER — CEPHALEXIN 500 MG/1
500 CAPSULE ORAL 2 TIMES DAILY
Qty: 14 CAP | Refills: 0 | Status: SHIPPED | OUTPATIENT
Start: 2017-10-10 | End: 2017-11-21

## 2017-10-10 NOTE — ASSESSMENT & PLAN NOTE
"Ongoing issue. Patient reports that she had \"a small seizure\" over the summer. Her Dilantin was increased from 400 mg to 500 mg daily. Prior to this episode it had been greater than 10 years since her previous seizure. I reviewed her chart at the time and even though she had a subtherapeutic level she had been stable on her current medication. Since increasing the medication she is reporting some fatigue and weight gain. Her  reports that they have a follow-up appointment with neurology next month.  "

## 2017-10-10 NOTE — ASSESSMENT & PLAN NOTE
Ongoing issue. Patient reports that she has achy pain in both of her knees. Patient reports the pain is worse with walking up stairs; worse with going from a seated to a standing position. She denies any specific swelling; she denies any numbness or tingling in her legs. She currently doesn't take anything for the pain

## 2017-10-10 NOTE — PROGRESS NOTES
"Subjective:   Nancie Joyner is a 72 y.o. female here today forSeizure, decreased kidney function, knee pain, drainage from umbilicus    Epilepsy  Ongoing issue. Patient reports that she had \"a small seizure\" over the summer. Her Dilantin was increased from 400 mg to 500 mg daily. Prior to this episode it had been greater than 10 years since her previous seizure. I reviewed her chart at the time and even though she had a subtherapeutic level she had been stable on her current medication. Since increasing the medication she is reporting some fatigue and weight gain. Her  reports that they have a follow-up appointment with neurology next month.    CKD (chronic kidney disease) stage 3, GFR 30-59 ml/min  Ongoing issue. Recent lab work shows a patient's GFR remains at 54. Review current medications do not reveal any significant impact    Of note, patient reports that she only drinks 2-5 glasses of water a day    Chronic pain of both knees  Ongoing issue. Patient reports that she has achy pain in both of her knees. Patient reports the pain is worse with walking up stairs; worse with going from a seated to a standing position. She denies any specific swelling; she denies any numbness or tingling in her legs. She currently doesn't take anything for the pain    Cellulitis of umbilicus  New problem. Patient is reporting redness and drainage that has been oozing from the umbilicus for the last couple of weeks. Patient reports that it is somewhat tender to palpation; there has been redness; clear/pustule drainage; she denies any fevers or chills. Patient has been using a homemade ointment but this has not been beneficial.     Patient is present with her     Current medicines (including changes today)  Current Outpatient Prescriptions   Medication Sig Dispense Refill   • cephALEXin (KEFLEX) 500 MG Cap Take 1 Cap by mouth 2 times a day. 14 Cap 0   • nystatin (MYCOSTATIN) 387608 UNIT/GM Cream topical cream Apply " "twice daily to affected for 7 days 1 Tube 0   • phenytoin ER (DILANTIN) 100 MG Cap Take 5 Caps by mouth every day. 360 Cap 3   • fluticasone (FLONASE) 50 MCG/ACT nasal spray Spray 1-2 Sprays in nose every day. 16 g 3   • Multiple Vitamin (MULTI-VITAMINS PO) Take  by mouth.     • Cholecalciferol (VITAMIN D PO) Take  by mouth.     • Cyanocobalamin (VITAMIN B12 PO) Take  by mouth.       No current facility-administered medications for this visit.      She  has a past medical history of CKD (chronic kidney disease) stage 3, GFR 30-59 ml/min (8/1/2016); Epilepsy (CMS-HCC) (8/1/2016); Osteopenia (8/1/2016); Seasonal allergies (8/1/2016); Seizure disorder (CMS-HCC); UI (urinary incontinence) (8/1/2016); Varicose veins (8/1/2016); Vitamin B12 deficiency (8/1/2016); and Vitamin D deficiency (8/1/2016).    ROS   No chest pain, no shortness of breath, no abdominal pain  +Redness and drainage at the umbilicus     Objective:     Blood pressure 122/94, pulse 88, temperature 37.1 °C (98.7 °F), resp. rate 16, height 1.575 m (5' 2\"), weight 93.4 kg (206 lb), SpO2 94 %. Body mass index is 37.68 kg/m².   Physical Exam:  Alert, oriented in no acute distress.  Eye contact is good, speech goal directed, affect calm  HEENT: conjunctiva non-injected, sclera non-icteric.  Pinna normal. Oral mucous membranes pink and moist with no lesions.  Neck No adenopathy or masses in the neck or supraclavicular regions.  Lungs: clear to auscultation bilaterally with good excursion.  CV: regular rate and rhythm.  Abdomen: soft, nontender, No CVAT ; Obese  Ext: no edema, color normal, vascularity normal, temperature normal  Neuro: Cranial nerves II through XII grossly intact   Skin: Umbilicus-patches of erythema along with scaling extending 1 inch from the edge of the umbilicus; trace amount of purulent drainage noted; no fever appreciated; no tenderness to palpation      Assessment and Plan:   The following treatment plan was discussed     1. Cellulitis " of umbilicus  cephALEXin (KEFLEX) 500 MG Cap    nystatin (MYCOSTATIN) [id number] UNIT/GM Cream topical cream    Uncontrolled. Complicated infection; oral antibiotic, Keflex provided. Also noted signs of yeast infection, nystatin cream provided.   2. Other epilepsy without status epilepticus, not intractable (CMS-HCC)  phenytoin ER (DILANTIN) 100 MG Cap    Stable. Continue current medications; continue follow-up with neurology. Monitor   3. CKD (chronic kidney disease) stage 3, GFR 30-59 ml/min      Stable. Monitor   4. Chronic pain of both knees      Uncontrolled. Encourage patient to start getting some more activity to see if this would be beneficial.Monitor        Followup: Return in about 6 months (around 4/10/2018) for seizures, Short.

## 2017-10-10 NOTE — ASSESSMENT & PLAN NOTE
Ongoing issue. Recent lab work shows a patient's GFR remains at 54. Review current medications do not reveal any significant impact    Of note, patient reports that she only drinks 2-5 glasses of water a day

## 2017-10-10 NOTE — ASSESSMENT & PLAN NOTE
New problem. Patient is reporting redness and drainage that has been oozing from the umbilicus for the last couple of weeks. Patient reports that it is somewhat tender to palpation; there has been redness; clear/pustule drainage; she denies any fevers or chills. Patient has been using a homemade ointment but this has not been beneficial.

## 2017-10-23 DIAGNOSIS — J30.1 SEASONAL ALLERGIC RHINITIS DUE TO POLLEN: ICD-10-CM

## 2017-10-25 RX ORDER — FLUTICASONE PROPIONATE 50 MCG
SPRAY, SUSPENSION (ML) NASAL
Qty: 16 G | Refills: 2 | Status: SHIPPED | OUTPATIENT
Start: 2017-10-25 | End: 2018-04-17 | Stop reason: SDUPTHER

## 2017-11-21 ENCOUNTER — OFFICE VISIT (OUTPATIENT)
Dept: NEUROLOGY | Facility: MEDICAL CENTER | Age: 72
End: 2017-11-21
Payer: MEDICARE

## 2017-11-21 VITALS
HEIGHT: 63 IN | DIASTOLIC BLOOD PRESSURE: 64 MMHG | WEIGHT: 201.9 LBS | OXYGEN SATURATION: 93 % | BODY MASS INDEX: 35.77 KG/M2 | TEMPERATURE: 98.1 F | SYSTOLIC BLOOD PRESSURE: 142 MMHG | HEART RATE: 78 BPM | RESPIRATION RATE: 16 BRPM

## 2017-11-21 DIAGNOSIS — G40.802 OTHER EPILEPSY WITHOUT STATUS EPILEPTICUS, NOT INTRACTABLE (HCC): ICD-10-CM

## 2017-11-21 DIAGNOSIS — G40.909 SEIZURE DISORDER (HCC): ICD-10-CM

## 2017-11-21 DIAGNOSIS — F81.9 LEARNING DIFFICULTY: ICD-10-CM

## 2017-11-21 PROCEDURE — 99214 OFFICE O/P EST MOD 30 MIN: CPT | Performed by: NURSE PRACTITIONER

## 2017-11-21 RX ORDER — PHENYTOIN SODIUM 100 MG/1
500 CAPSULE, EXTENDED RELEASE ORAL DAILY
Qty: 450 CAP | Refills: 1 | Status: SHIPPED | OUTPATIENT
Start: 2017-11-21 | End: 2018-07-19 | Stop reason: SDUPTHER

## 2017-11-21 ASSESSMENT — PATIENT HEALTH QUESTIONNAIRE - PHQ9: CLINICAL INTERPRETATION OF PHQ2 SCORE: 0

## 2017-11-21 ASSESSMENT — ENCOUNTER SYMPTOMS
CONSTITUTIONAL NEGATIVE: 1
SORE THROAT: 0
SEIZURES: 0
NERVOUS/ANXIOUS: 0
COUGH: 0
NAUSEA: 0
ABDOMINAL PAIN: 0
HEADACHES: 0
DOUBLE VISION: 0
FALLS: 0
VOMITING: 0
DIARRHEA: 0
BACK PAIN: 1
DEPRESSION: 0

## 2017-11-21 ASSESSMENT — PAIN SCALES - GENERAL: PAINLEVEL: NO PAIN

## 2017-11-21 NOTE — PROGRESS NOTES
"Subjective:      Nancie Joyner is a 72 y.o. female who presents with New Patient (Epilepsy)        HPI Followed per Dr Palacio.    Has been taking phenytoin for many years.  She \"gained weight\" and then the dose was increased from phenytoin 400mg to 500mg qhs.    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  She has been taking phenytoin since then.  Seizures started in her 40's.    Family father's sister and father's brother's child with epilepsy.    Unknown diagnosed-- told that she has \"small ones\".  The first big one was the only one she had.  Continued to have \"little ones\" after taking the right dose of medication.    Typical seizure: \"like a buzz in my head\", like electricity then gets dizzy, upset in the stomach and disoriented and then it fades away.    In February/March 2017 she was taken into Urgent Care for concern for seizures.    2009 Brain MRI without:   STABLE FINDING OF ABBREVIATION OF THE CAUDAL ASPECT OF THE CORPUS CALLOSUM, LIKELY DEVELOPMENTAL IN NATURE.  THE REMAINDER OF THE STUDY IS GROSSLY NORMAL WITH SOME TECHNICAL LIMITATIONS DUE TO METALLIC ARTIFACT   AS DESCRIBED ABOVE.    One EEG in 2010 read as normal.    Childhood: healthy.    Medical history: generally healthy, suffers from lower back pain, arthritis in the knees. Prone to kidney infections.    Surgical history: hysterectomy, cholecystectomy, varicose vein stripping, cataract surgery bilaterally    Lives in [place], [place] with  and son.  Four total children.  Son with seizures-- he is 45 years old.    Education: slow learner, math is a struggle.  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     Ref. Range 6/12/2017 08:18   Phenytoin Latest Ref Range: 10.0 - 20.0 ug/mL 11.5     3/2017 DEXA:  According to the World Health Organization classification, bone mineral density of this patient is normal.    Current Outpatient " "Prescriptions   Medication Sig Dispense Refill   • fluticasone (FLONASE) 50 MCG/ACT nasal spray INHALE 1 OR 2 SPRAYS INTO NOSE EVERY DAY 16 g 2   • nystatin (MYCOSTATIN) [id number] UNIT/GM Cream topical cream Apply twice daily to affected for 7 days 1 Tube 0   • phenytoin ER (DILANTIN) 100 MG Cap Take 5 Caps by mouth every day. 360 Cap 3   • Multiple Vitamin (MULTI-VITAMINS PO) Take  by mouth.     • Cholecalciferol (VITAMIN D PO) Take  by mouth.     • Cyanocobalamin (VITAMIN B12 PO) Take  by mouth.     • cephALEXin (KEFLEX) 500 MG Cap Take 1 Cap by mouth 2 times a day. 14 Cap 0     No current facility-administered medications for this visit.        Review of Systems   Constitutional: Negative.    HENT: Negative for hearing loss, nosebleeds and sore throat.         No recent head injury.   Eyes: Negative for double vision.        No new loss of vision.   Respiratory: Negative for cough.         No recent lung infections.   Cardiovascular: Negative for chest pain.   Gastrointestinal: Negative for abdominal pain, diarrhea, nausea and vomiting.   Genitourinary: Negative.    Musculoskeletal: Positive for back pain (lower). Negative for falls.   Skin: Negative.    Neurological: Negative for seizures and headaches.   Endo/Heme/Allergies:        No history of endocrine dysfunction.  No new problems.   Psychiatric/Behavioral: Negative for depression. The patient is not nervous/anxious.         No recent mood changes.          Objective:     /64   Pulse 78   Temp 36.7 °C (98.1 °F)   Resp 16   Ht 1.6 m (5' 3\")   Wt 91.6 kg (201 lb 14.4 oz)   SpO2 93%   BMI 35.76 kg/m²      Physical Exam   Constitutional: She is oriented to person, place, and time. She appears well-developed and well-nourished. No distress.   HENT:   Head: Normocephalic and atraumatic.   Nose: Nose normal.   Glasses to read only     Eyes: Pupils are equal, round, and reactive to light.   Neck: Normal range of motion.   Cardiovascular: Normal rate and " regular rhythm.  Exam reveals no gallop and no friction rub.    No murmur heard.  Pulmonary/Chest: Effort normal and breath sounds normal. No respiratory distress.   Musculoskeletal: Normal range of motion.   Lymphadenopathy:     She has no cervical adenopathy.   Neurological: She is alert and oriented to person, place, and time. Coordination normal.   Naturally right-handed, pleasantly forgetful.  Learning delayed  CN II: Fundi normal, visual fields full to confrontation.  CN III, IV, VI: Pupils equal, round, and reactive to light.  Extraocular movements full and intact in horizontal and vertical gaze.  CN V: Normal in motor and sensory modalities.  CN VII: No evidence of facial asymmetry.  CN VIII: Hearing grossly intact.  CN IX, X: Palate elevates symmetrically and in the midline.  CN XI: Normal sternocleidomastoid strength.  CN XII: Tongue is in the midline.    Motor: Normal muscle bulk and tone, with full and symmetric strength.  Sensory: Intact to light touch.  No sense of pinprick or vibration in lower extremities from knee down. Minimal sense of proprioception, and graphesthesia.  DTR's: 1+ throughout with flexor plantar responses.  Cerebellar/Coordination: Normal finger to finger.  Arthritic joints.  Gait: Normal casual gait.  Poor tandem gait.    Skin: Skin is warm and dry.   Psychiatric:   Learning delay               Assessment/Plan:     History of Epilepsy:  Onset of seizures in her 40's, 30+ years ago.  Seizures concerning for partial seizure type.  Last concern for seizures was March 2017.  No other AED's tried and failed.    Learning delayed and naive:  Lives on her own.    Counseled at length regarding the phenytoin.  She does not wish to entertain a change in AED at this time.  Continue phenytoin ER 500mg qhs.  Needs to supplement with Calcium 2000mg and Vit D 2000IU per day.    Obtain VEEG to evaluate for subclinical seizures.    Return for follow-up after EEG to discuss plan of  care.      EDUCATION AND COUNSELING:  -Education was provided to the patient and/or family regarding diagnosis and prognosis. The chronic and unpredictable nature of the condition was discussed. There is increased risk for additional events, which may carry potential for significant injuries and death.    -We reviewed the current antiepileptic regimen. Potential side effects of antiepileptics were discussed at length, including but no limited to: hypersensitivity reactions (rash and others, some of which can be fatal), visual field changes (some of which may be irreversible), glaucoma, diplopia, kidney stones, osteopenia/osteoporosis/bone fractures, hyperthermia/anhydrosis, tremors, ataxia, dizziness, fatigue, increased risk for falls, cardiac arrhythmias/syncope, gastrointestinal (hepatitis, pancreatitis, gastritis, ulcers), gingival hypertrophy, drowsiness, sedation, anxiety/nervousness, increased risk for suicide, increased risk for depression, and psychosis. We reviewed drug-drug interactions and their potential effect on seizure control and medication side effects.    -Patient/family educated on SUDEP (Sudden Death in Epilepsy). Counseling was provided on the importance of strict medication and follow up compliance. The patient understands the risks associated with non-adherence with the medical plan as outlined, including but not limited to an increased risk for breakthrough seizures, which may contribute to injuries, disability, status epilepticus, and even death.    -Counseling was also provided on potential effects of alcohol and other drugs, which may lower seizure threshold and/or affect the metabolism of antiepileptic drugs. I recommend avoidance of alcohol and illegal drugs.  -Recommend proper hydration, regular exercise, proper sleep hygiene (7-8 hrs of overnight sleep, avoid sleep deprivation).    -I have made the patient aware of mandatory reporting required by the law in the [facility]  regarding episodes of seizures, loss of consciousness, and/or alteration of awareness. The patient and family are responsible for reporting events to the DMV, instructions were provided. The patient verbalized understanding and states she has not been driving. Other seizure precautions were discussed at length, including no diving, no skydiving, no unsupervised swimming, no Jacuzzi or bathing in bathtubs.        Pt agrees with plan.

## 2018-01-22 ENCOUNTER — OFFICE VISIT (OUTPATIENT)
Dept: URGENT CARE | Facility: CLINIC | Age: 73
End: 2018-01-22
Payer: MEDICARE

## 2018-01-22 ENCOUNTER — APPOINTMENT (OUTPATIENT)
Dept: RADIOLOGY | Facility: IMAGING CENTER | Age: 73
End: 2018-01-22
Attending: PHYSICIAN ASSISTANT
Payer: MEDICARE

## 2018-01-22 VITALS
OXYGEN SATURATION: 97 % | TEMPERATURE: 97.4 F | SYSTOLIC BLOOD PRESSURE: 122 MMHG | HEART RATE: 76 BPM | RESPIRATION RATE: 16 BRPM | WEIGHT: 204 LBS | DIASTOLIC BLOOD PRESSURE: 68 MMHG | HEIGHT: 63 IN | BODY MASS INDEX: 36.14 KG/M2

## 2018-01-22 DIAGNOSIS — M54.42 ACUTE LEFT-SIDED LOW BACK PAIN WITH LEFT-SIDED SCIATICA: ICD-10-CM

## 2018-01-22 PROCEDURE — 72100 X-RAY EXAM L-S SPINE 2/3 VWS: CPT | Mod: TC | Performed by: PHYSICIAN ASSISTANT

## 2018-01-22 PROCEDURE — 99214 OFFICE O/P EST MOD 30 MIN: CPT | Performed by: PHYSICIAN ASSISTANT

## 2018-01-22 RX ORDER — TRAMADOL HYDROCHLORIDE 50 MG/1
50 TABLET ORAL EVERY 4 HOURS PRN
Qty: 30 TAB | Refills: 0 | Status: SHIPPED | OUTPATIENT
Start: 2018-01-22 | End: 2018-01-25

## 2018-01-22 ASSESSMENT — ENCOUNTER SYMPTOMS
SENSORY CHANGE: 0
HIP PAIN: 1
CARDIOVASCULAR NEGATIVE: 1
RESPIRATORY NEGATIVE: 1
FALLS: 0
GASTROINTESTINAL NEGATIVE: 1
BACK PAIN: 1
CONSTITUTIONAL NEGATIVE: 1
TINGLING: 0

## 2018-01-22 ASSESSMENT — PAIN SCALES - GENERAL: PAINLEVEL: 8=MODERATE-SEVERE PAIN

## 2018-01-22 NOTE — PROGRESS NOTES
Subjective:      Nancie Joyner is a 72 y.o. female who presents with Hip Pain (left hip x 10 days )            Hip Pain   This is a new problem. The current episode started 1 to 4 weeks ago. The problem occurs constantly. The problem has been unchanged. The symptoms are aggravated by walking and standing. She has tried NSAIDs and oral narcotics for the symptoms. The treatment provided mild relief.   Left-sided hip pain and lumbar pain ×10 days. It is worse with walking. History of chronic arthritis. Has orthopedic appointment in 2 weeks. Requesting medication for pain. Denies bowel or bladder incontinence, perianal numbness, fevers, chills, abdominal pain, chest pain.       PMH:  has a past medical history of CKD (chronic kidney disease) stage 3, GFR 30-59 ml/min (8/1/2016); Epilepsy (CMS-HCC) (8/1/2016); Osteopenia (8/1/2016); Seasonal allergies (8/1/2016); Seizure disorder (CMS-HCC); UI (urinary incontinence) (8/1/2016); Varicose veins (8/1/2016); Vitamin B12 deficiency (8/1/2016); and Vitamin D deficiency (8/1/2016).  MEDS:   Current Outpatient Prescriptions:   •  phenytoin ER (DILANTIN) 100 MG Cap, Take 5 Caps by mouth every day., Disp: 450 Cap, Rfl: 1  •  Multiple Vitamin (MULTI-VITAMINS PO), Take  by mouth., Disp: , Rfl:   •  Cholecalciferol (VITAMIN D PO), Take  by mouth., Disp: , Rfl:   •  Cyanocobalamin (VITAMIN B12 PO), Take  by mouth., Disp: , Rfl:   •  fluticasone (FLONASE) 50 MCG/ACT nasal spray, INHALE 1 OR 2 SPRAYS INTO NOSE EVERY DAY, Disp: 16 g, Rfl: 2  •  nystatin (MYCOSTATIN) [id number] UNIT/GM Cream topical cream, Apply twice daily to affected for 7 days, Disp: 1 Tube, Rfl: 0  ALLERGIES: No Known Allergies  SURGHX:   Past Surgical History:   Procedure Laterality Date   • ABDOMINAL HYSTERECTOMY TOTAL     • HYSTERECTOMY, TOTAL ABDOMINAL     • VEIN STRIPPING       SOCHX:  reports that she has quit smoking. Her smoking use included Cigarettes. She has a 1.00 pack-year smoking history. She has never used  "smokeless tobacco. She reports that she does not drink alcohol or use drugs.  FH: family history includes Breast Cancer in her sister; Cancer in her brother, mother, and sister; Heart Disease in her sister and sister; Lung Cancer in her father; Other in her paternal aunt.      Review of Systems   Constitutional: Negative.    HENT: Negative.    Respiratory: Negative.    Cardiovascular: Negative.    Gastrointestinal: Negative.    Musculoskeletal: Positive for back pain and joint pain. Negative for falls.   Neurological: Negative for tingling and sensory change.       Medications, Allergies, and current problem list reviewed today in Epic     Objective:     /68   Pulse 76   Temp 36.3 °C (97.4 °F)   Resp 16   Ht 1.6 m (5' 3\")   Wt 92.5 kg (204 lb)   SpO2 97%   BMI 36.14 kg/m²      Physical Exam   Constitutional: She is oriented to person, place, and time. She appears well-developed and well-nourished. No distress.   HENT:   Head: Normocephalic and atraumatic.   Right Ear: External ear normal.   Left Ear: External ear normal.   Eyes: Conjunctivae and EOM are normal.   Neck: Normal range of motion. Neck supple.   Cardiovascular: Normal rate, regular rhythm and normal heart sounds.    Pulmonary/Chest: Effort normal and breath sounds normal. No respiratory distress. She has no wheezes.   Musculoskeletal:        Lumbar back: She exhibits decreased range of motion, tenderness and pain.        Back:    Neurological: She is alert and oriented to person, place, and time.   Skin: Skin is warm and dry. She is not diaphoretic.   Psychiatric: She has a normal mood and affect. Her behavior is normal. Judgment and thought content normal.   Nursing note and vitals reviewed.          Xray: no fracture or dislocation by my read. Radiology review pending.    Impression       1.  No compression deformity or acute fracture is identified.    2.  Degenerative disc disease and facet arthropathy is most prominent in the upper " lumbar spine with some increased compared to the prior exam.    3.  Dextroscoliosis again noted.    4.  Retrolisthesis at L2-L4 again noted.       Assessment/Plan:     1. Acute left-sided low back pain with left-sided sciatica  DX-LUMBAR SPINE-2 OR 3 VIEWS    CONSENT FOR OPIATE PRESCRIPTION    DISCONTINUED: tramadol (ULTRAM) 50 MG Tab     Exam limited secondary to mobility issues. Vital signs normal, x-ray showed DDD and age-related changes. No red flag symptoms  Patient has orthopedic appointment in the next 2 weeks.  Trial of tramadol, patient does not drive currently  [facility] Aware web site evaluation: I have obtained and reviewed patient utilization report from [facility] pharmacy database prior to writing prescription for controlled substance II, III or IV. Based on the report and my clinical assessment the prescription is medically necessary, proper consent form was signed and scanned into chart  Patient is cautioned on sedation potential of narcotic medication; no drinking, driving or operating heavy machinery while on this medication.  OTC meds and conservative measures as discussed  Return to clinic or go to ED if symptoms worsen or persist. Indications for ED discussed at length. Patient voices understanding. Follow-up with your primary care provider in 3-5 days. Red flags discussed.    Please note that this dictation was created using voice recognition software. I have made every reasonable attempt to correct obvious errors, but I expect that there are errors of grammar and possibly content that I did not discover before finalizing the note.

## 2018-01-25 ENCOUNTER — HOSPITAL ENCOUNTER (OUTPATIENT)
Dept: RADIOLOGY | Facility: MEDICAL CENTER | Age: 73
End: 2018-01-25
Attending: INTERNAL MEDICINE
Payer: MEDICARE

## 2018-01-25 ENCOUNTER — OFFICE VISIT (OUTPATIENT)
Dept: MEDICAL GROUP | Facility: PHYSICIAN GROUP | Age: 73
End: 2018-01-25
Payer: MEDICARE

## 2018-01-25 ENCOUNTER — TELEPHONE (OUTPATIENT)
Dept: MEDICAL GROUP | Facility: PHYSICIAN GROUP | Age: 73
End: 2018-01-25

## 2018-01-25 VITALS
BODY MASS INDEX: 36.8 KG/M2 | OXYGEN SATURATION: 92 % | DIASTOLIC BLOOD PRESSURE: 60 MMHG | HEIGHT: 62 IN | WEIGHT: 200 LBS | TEMPERATURE: 97 F | HEART RATE: 70 BPM | RESPIRATION RATE: 16 BRPM | SYSTOLIC BLOOD PRESSURE: 112 MMHG

## 2018-01-25 DIAGNOSIS — M25.552 PAIN OF LEFT HIP JOINT: ICD-10-CM

## 2018-01-25 PROCEDURE — 99213 OFFICE O/P EST LOW 20 MIN: CPT | Performed by: INTERNAL MEDICINE

## 2018-01-25 PROCEDURE — 73502 X-RAY EXAM HIP UNI 2-3 VIEWS: CPT | Mod: LT

## 2018-01-25 RX ORDER — CYCLOBENZAPRINE HCL 5 MG
5-10 TABLET ORAL 3 TIMES DAILY PRN
Qty: 60 TAB | Refills: 0 | Status: SHIPPED | OUTPATIENT
Start: 2018-01-25 | End: 2018-11-26

## 2018-01-25 NOTE — TELEPHONE ENCOUNTER
----- Message from Luc Marie M.D. sent at 1/25/2018 11:52 AM PST -----  X-ray shows mild arthritis of the hip. I recommend patient proceed with plan discussed in clinic today including Flexeril as needed, PT, and seeing the orthopedic surgeon. Please call patient and inform them. Thank you  - Dr. Marie

## 2018-01-25 NOTE — PROGRESS NOTES
Subjective:   Nancie Joyner is a 72 y.o. female here today for left hip pain    Pain of left hip joint  Patient is here today with symptoms of left hip pain. She was seen in a different clinic 3 days ago for this. A lumbar spine x-ray was obtained which showed DDD of the upper lumbar spine and she was prescribed tramadol. This has been present for about 2 weeks. She's had chronic back pain but not to where it causes pain in the hip. It is worse when she walks and at times certain steps will cause spasms and she feels weak. She has been using a cane. She was previously following with a back specialist in [place] for chronic back pain.     Today patient reports she continues to have pain. The tramadol is not helping her much. She has taken OTC NSAIDS with no improvement in symptoms. Since onset the pain is worsening. Denies fevers/chills, numbness/weakness of extremities, bowel incontinence, saddle anesthesia. She has chronic urinary incontinence that is unchanged.     She is pending appointment with [place] [facility] but not for 2-3 weeks.        Current medicines (including changes today)  Current Outpatient Prescriptions   Medication Sig Dispense Refill   • Calcium-Magnesium-Vitamin D (CALCIUM 500 PO) Take  by mouth.     • cyclobenzaprine (FLEXERIL) 5 MG tablet Take 1-2 Tabs by mouth 3 times a day as needed. 60 Tab 0   • phenytoin ER (DILANTIN) 100 MG Cap Take 5 Caps by mouth every day. 450 Cap 1   • fluticasone (FLONASE) 50 MCG/ACT nasal spray INHALE 1 OR 2 SPRAYS INTO NOSE EVERY DAY 16 g 2   • nystatin (MYCOSTATIN) 461128 UNIT/GM Cream topical cream Apply twice daily to affected for 7 days 1 Tube 0   • Multiple Vitamin (MULTI-VITAMINS PO) Take  by mouth.     • Cholecalciferol (VITAMIN D PO) Take  by mouth.     • Cyanocobalamin (VITAMIN B12 PO) Take  by mouth.       No current facility-administered medications for this visit.      She  has a past medical history of CKD (chronic kidney disease) stage 3, GFR  "30-59 ml/min (8/1/2016); Epilepsy (CMS-HCC) (8/1/2016); Osteopenia (8/1/2016); Seasonal allergies (8/1/2016); Seizure disorder (CMS-HCC); UI (urinary incontinence) (8/1/2016); Varicose veins (8/1/2016); Vitamin B12 deficiency (8/1/2016); and Vitamin D deficiency (8/1/2016).    ROS   Denies fevers/chills, numbness/weakness of extremities     Objective:     Blood pressure 112/60, pulse 70, temperature 36.1 °C (97 °F), resp. rate 16, height 1.575 m (5' 2\"), weight 90.7 kg (200 lb), SpO2 92 %. Body mass index is 36.58 kg/m².   Physical Exam:  Constitutional: Alert & oriented, no acute distress  Eye: Conjunctiva clear, lids normal, no discharge  ENMT: Lips without lesions, normal external nose and ears  Respiratory: Unlabored respiratory effort, lungs clear to auscultation, no wheezes, no ronchi  Cardiovascular: Normal S1, S2, no murmur  Skin: Warm, dry, good turgor, no rashes in visible areas  Neuro: Normal sensation in bilateral lower extremities, hip flexor weakness on left side due to pain, pt ambulating with cane  MSK: TTP of left lateral lower back no TTP of left hip, Decreased left hip flexion due to pain    Assessment and Plan:   The following treatment plan was discussed    1. Pain of left hip joint  Will get x-ray of hip for further evaluation. Given weakness and pain associated with that we'll refer to physical therapy. Will change medication from tramadol to Flexeril, patient counseled on risks associated with medication. She does not drive currently. Patient is pending appointment with orthopedic surgery and I have encouraged her to follow up with this appointment. No red flags with regards to back pain that necessitate urgent imaging  - DX-HIP-COMPLETE - UNILATERAL 2+ LEFT; Future  - REFERRAL TO PHYSICAL THERAPY Reason for Therapy: Eval/Treat/Report  - cyclobenzaprine (FLEXERIL) 5 MG tablet; Take 1-2 Tabs by mouth 3 times a day as needed.  Dispense: 60 Tab; Refill: 0    Followup: Return if symptoms worsen " or fail to improve.    Please note that this dictation was created using voice recognition software. I have made every reasonable attempt to correct obvious errors, but I expect that there are errors of grammar and possibly content that I did not discover before finalizing the note.

## 2018-01-25 NOTE — ASSESSMENT & PLAN NOTE
Patient is here today with symptoms of left hip pain. She was seen in a different clinic 3 days ago for this. A lumbar spine x-ray was obtained which showed DDD of the upper lumbar spine and she was prescribed tramadol. This has been present for about 2 weeks. She's had chronic back pain but not to where it causes pain in the hip. It is worse when she walks and at times certain steps will cause spasms and she feels weak. She has been using a cane. She was previously following with a back specialist in [place] for chronic back pain.     Today patient reports she continues to have pain. The tramadol is not helping her much. She has taken OTC NSAIDS with no improvement in symptoms. Since onset the pain is worsening. Denies fevers/chills, numbness/weakness of extremities, bowel incontinence, saddle anesthesia. She has chronic urinary incontinence that is unchanged.     She is pending appointment with [place] [facility] but not for 2-3 weeks.

## 2018-01-26 ENCOUNTER — TELEPHONE (OUTPATIENT)
Dept: MEDICAL GROUP | Facility: PHYSICIAN GROUP | Age: 73
End: 2018-01-26

## 2018-01-26 NOTE — TELEPHONE ENCOUNTER
MEDICATION PRIOR AUTHORIZATION NEEDED:    1. Name of Medication: cyclobenzaprine (FLEXERIL) 5 MG tablet    2. Requested By (Name of Pharmacy): [facility]     3. Is insurance on file current? Yes    4. What is the name & phone number of the 3rd party payor? [employer] script Key: [id number]

## 2018-01-27 NOTE — TELEPHONE ENCOUNTER
FINAL PRIOR AUTHORIZATION STATUS:    1.  Name of Medication & Dose: cyclobenzaprine (FLEXERIL) 5 MG tablet     2. Prior Auth Status: Approved through 01/26/19     3. Action Taken: Pharmacy Notified: yes Patient Notified: N\A

## 2018-02-05 ENCOUNTER — PHYSICAL THERAPY (OUTPATIENT)
Dept: PHYSICAL THERAPY | Facility: REHABILITATION | Age: 73
End: 2018-02-05
Attending: INTERNAL MEDICINE
Payer: MEDICARE

## 2018-02-05 DIAGNOSIS — M25.552 LEFT HIP PAIN: ICD-10-CM

## 2018-02-05 DIAGNOSIS — M54.42 LEFT-SIDED LOW BACK PAIN WITH LEFT-SIDED SCIATICA, UNSPECIFIED CHRONICITY: ICD-10-CM

## 2018-02-05 DIAGNOSIS — R26.89 DECREASED MOBILITY: ICD-10-CM

## 2018-02-05 PROCEDURE — G8978 MOBILITY CURRENT STATUS: HCPCS | Mod: CL

## 2018-02-05 PROCEDURE — 97161 PT EVAL LOW COMPLEX 20 MIN: CPT

## 2018-02-05 PROCEDURE — G8979 MOBILITY GOAL STATUS: HCPCS | Mod: CK

## 2018-02-05 PROCEDURE — 97110 THERAPEUTIC EXERCISES: CPT

## 2018-02-05 SDOH — ECONOMIC STABILITY: GENERAL: QUALITY OF LIFE: FAIR

## 2018-02-05 ASSESSMENT — ENCOUNTER SYMPTOMS
PAIN SCALE: 3
PAIN SCALE AT LOWEST: 0
PAIN SCALE AT HIGHEST: 9

## 2018-02-05 NOTE — OP THERAPY EVALUATION
Outpatient Physical Therapy  INITIAL EVALUATION    [facility] [place]  [address], [address]  [place] [address]  Phone:  [phone]  Fax:  [phone]    Date of Evaluation: 2018    Patient: Nancie Joyner  YOB: 1945  MRN: [id number]     Referring Provider: Luc Marie M.D.  [address]  [place] [address]   Referring Diagnosis Pain of left hip joint [M25.552]     Time Calculation  Start time: 0900  Stop time: 0950 Time Calculation (min): 50 minutes     Physical Therapy Occurrence Codes    Date of onset of impairment:  1/15/18   Date physical therapy care plan established or reviewed:  18   Date physical therapy treatment started:  18          Chief Complaint: Back Problem and Hip Problem    Visit Diagnoses     ICD-10-CM   1. Left hip pain M25.552   2. Decreased mobility R26.89   3. Left-sided low back pain with left-sided sciatica, unspecified chronicity M54.42         Subjective:   History of Present Illness:     Date of onset:  1/15/2018  Quality of life:  Fair  Prior level of function:  Prior to 3 weeks no hip pain but ongoing lower back pain  Pain:     Current pain rating:  3    At best pain ratin    At worst pain ratin  Activities of Daily Living:     Patient reported ADL status: Decreased ability to ambulate 400ft  Difficulty with flexion and lifting objets from knee level  Requires assistance to transfer to car by lifting legs with arms  Patient Goals:     Patient goals for therapy:  Increased strength and decreased pain    Patient is a 72 y.o. female that presents to therapy with left hip pain / lower back pain. States that symptoms were  insidious in onset. Reports the pain quality to be sharp/dull, constant and are primarily in the lower back. Reports that symptoms now getting better. States that aggravating factors are walking, rolling over in bed.  States that easng factors are sitting down, meds.  Denies any red flags.    Past Medical History:   Diagnosis Date   • CKD (chronic kidney disease) stage 3, GFR 30-59 ml/min 8/1/2016   • Epilepsy (CMS-HCC) 8/1/2016   • Osteopenia 8/1/2016   • Seasonal allergies 8/1/2016   • Seizure disorder (CMS-HCC)    • UI (urinary incontinence) 8/1/2016   • Varicose veins 8/1/2016   • Vitamin B12 deficiency 8/1/2016   • Vitamin D deficiency 8/1/2016     Past Surgical History:   Procedure Laterality Date   • ABDOMINAL HYSTERECTOMY TOTAL     • HYSTERECTOMY, TOTAL ABDOMINAL     • VEIN STRIPPING       Social History   Substance Use Topics   • Smoking status: Former Smoker     Packs/day: 0.50     Years: 2.00     Types: Cigarettes   • Smokeless tobacco: Never Used   • Alcohol use No     Family and Occupational History     Social History   • Marital status:      Spouse name: N/A   • Number of children: N/A   • Years of education: N/A       Objective     Neurological Testing     Reflexes   Left   Patellar (L4): trace (1+)  Achilles (S1): trace (1+)  Ankle clonus reflex: negative  Babinski sign: negative    Right   Patellar (L4): trace (1+)  Achilles (S1): trace (1+)  Ankle clonus reflex: negative  Babinski sign: negative    Dermatome testing   Lumbar (left)   All left lumbar dermatomes intact    Lumbar (right)   All right lumbar dermatomes intact    Palpation   Left   Hypertonic in the gluteus sylvain, gluteus medius, lumbar paraspinals and quadratus lumborum.   Tenderness of the gluteus sylvain, gluteus medius, lumbar paraspinals and quadratus lumborum.     Right   Hypertonic in the gluteus sylvain, gluteus medius, lumbar paraspinals and quadratus lumborum.   Tenderness of the gluteus sylvain, gluteus medius, lumbar paraspinals and quadratus lumborum.     Active Range of Motion     Lumbar   Flexion: Lumbar active flexion: 80deg.  Extension: Lumbar active extension: 25deg.  Left lateral flexion: Left lateral lumbar spine flexion: 38deg.  Right lateral flexion: Right lateral lumbar spine flexion:  25deg.    Passive Range of Motion   Left Hip   Flexion: 90 degrees with pain  External rotation (90/90): 25 degrees with pain  Internal rotation (90/90): 30 degrees with pain    Right Hip   Flexion: 110 degrees   External rotation (90/90): 25 degrees   Internal rotation (90/90): 33 degrees     Joint Play   Spine     Central PA Regent        T10: hypomobile       T11: hypomobile       T12: hypomobile       L1: hypomobile       L2: painful       L3: painful       L4: painful        Strength:      Left Hip   Planes of Motion   Left hip flexors strength: empty pain.  Abduction: 3+  Adduction: 4-    Right Hip   Planes of Motion   Flexion: 4-  Abduction: 3+  Adduction: 4-    Left Knee   Flexion: 4+  Extension: 5    Right Knee   Flexion: 4+  Extension: 5    Left Ankle/Foot   Dorsiflexion: 4+    Right Ankle/Foot   Dorsiflexion: 5    Tests     Left Hip   Positive PETTY.   Negative piriformis.   90/90 SLR: Positive.   SLR: Positive.     Right Hip   SLR: Negative.     Additional Tests Details  Traction (-)  Ambulation     Observational Gait   Gait: antalgic   Decreased walking speed and stride length.     Additional Observational Gait Details  Forward lean, decreased knee extension during stance phase        Therapeutic Exercises (CPT 60844):     Total treatment time spent on Therapeutic Exercises: 10 minutes.      1. Basic TrA, x5min, HEP    2. Seated hip abd, 3x10, L2 HEP        Assessment, Response and Plan:   Impairments: abnormal ADL function, abnormal or restricted ROM, activity intolerance, impaired physical strength, limited mobility and pain with function    Assessment details:  Patient's hip presents with signs and symptoms consistent with a lumbar origin (spondylosis/functional instability). Patient also presents with signs and symptoms consistent with minor hip OA. Patient will benefit from therapy to improve mobility and decrease further functional decline.   Prognosis: fair    Goals:   Short Term Goals:   1)  Patient's hip abd strength by a half muscle grade to improve standing tolerance.  2) Patient's symptoms will decrease to allow for ambulation >300ft for community events.   Short term goal time span:  4-6 weeks      Long Term Goals:    1) Patient's symptoms will improve to allow for flexion to lift objects from knee level for ADL.  2) Patient's LEFS score will improve by 15 points to demonstrate improved functional mobility.   Long term goal time span:  6-8 weeks    Plan:   Therapy options:  Physical therapy treatment to continue  Planned therapy interventions:  E Stim Unattended (CPT 48559), Hot or Cold Pack Therapy (CPT 87181), Manual Therapy (CPT 32902), Neuromuscular Re-education (CPT 93376), Therapeutic Activities (CPT 02363) and Therapeutic Exercise (CPT 29342)  Frequency:  2x week  Duration in weeks:  6  Discussed with:  Patient  Plan details:  Progress with lumbar/LE strength training program.      Functional Limitation G-Codes and Severity Modifiers  PT Functional Assessment Tool Used: LEFS  PT Functional Assessment Score: 32  Current: Mobility: Current (): CL   Goal: Mobility: Goal (): CK     Referring provider co-signature:  I have reviewed this plan of care and my co-signature certifies the need for services.  Certification Dates:   From 2/5/18     To 4/18/18    Physician Signature: ________________________________ Date: ______________

## 2018-02-08 ENCOUNTER — PHYSICAL THERAPY (OUTPATIENT)
Dept: PHYSICAL THERAPY | Facility: REHABILITATION | Age: 73
End: 2018-02-08
Attending: INTERNAL MEDICINE
Payer: MEDICARE

## 2018-02-08 DIAGNOSIS — M54.42 LEFT-SIDED LOW BACK PAIN WITH LEFT-SIDED SCIATICA, UNSPECIFIED CHRONICITY: ICD-10-CM

## 2018-02-08 DIAGNOSIS — M25.552 LEFT HIP PAIN: ICD-10-CM

## 2018-02-08 DIAGNOSIS — R26.89 DECREASED MOBILITY: ICD-10-CM

## 2018-02-08 PROCEDURE — 97110 THERAPEUTIC EXERCISES: CPT

## 2018-02-08 NOTE — OP THERAPY DAILY TREATMENT
Outpatient Physical Therapy  DAILY TREATMENT     [facility] [place]  [address] [place] [address], [address]  [facility] [address]  Phone:  [phone]  Fax:  [phone]    Date: 02/08/2018    Patient: Nancie Joyner  YOB: 1945  MRN: [id number]     Time Calculation  Start time: 0855  Stop time: 0935 Time Calculation (min): 40 minutes     Chief Complaint: Back Problem and Hip Problem    Visit #: 2    SUBJECTIVE:  Patient reported no difficulty with exercises given on evaluation. Patient reports no real change in symptoms.     OBJECTIVE:  Current objective measures: Post sit to stand training : patient reports no increase in pain with standing vs prior pain with sit to stand transfer         Therapeutic Exercises (CPT 25186):     Total treatment time spent on Therapeutic Exercises: 38 minutes.      1. Basic TrA edu, x10min    2. Basic TrA with leg lift R only, 2x10    3. Basic TrA with BKFO, x20    4. Plunger, 2x10    5. Log roll training, x10min, demostration and patient participation required Leslie to CGA    6. Sit ot stand training, x5min (2x10)        Pain rating before treatment: 4  Pain rating after treatment: 4    ASSESSMENT:   Response to treatment: Patient responded well to sit to stand training with an overall decrease in pain with transfers. Patient will continue to progress with stability training.     PLAN/RECOMMENDATIONS:   Plan for treatment: therapy treatment to continue next visit.  Planned interventions for next visit: continue with current treatment, E-stim unattended (CPT 37487), hot/cold pack therapy (CPT 82753), manual therapy (CPT 31626), neuromuscular re-education (CPT 89104) and therapeutic exercise (CPT 33814).

## 2018-02-12 ENCOUNTER — PHYSICAL THERAPY (OUTPATIENT)
Dept: PHYSICAL THERAPY | Facility: REHABILITATION | Age: 73
End: 2018-02-12
Attending: INTERNAL MEDICINE
Payer: MEDICARE

## 2018-02-12 DIAGNOSIS — R26.89 DECREASED MOBILITY: ICD-10-CM

## 2018-02-12 DIAGNOSIS — M54.42 LEFT-SIDED LOW BACK PAIN WITH LEFT-SIDED SCIATICA, UNSPECIFIED CHRONICITY: ICD-10-CM

## 2018-02-12 DIAGNOSIS — M25.552 LEFT HIP PAIN: ICD-10-CM

## 2018-02-12 PROCEDURE — 97110 THERAPEUTIC EXERCISES: CPT

## 2018-02-12 PROCEDURE — 97112 NEUROMUSCULAR REEDUCATION: CPT

## 2018-02-12 NOTE — OP THERAPY DAILY TREATMENT
Outpatient Physical Therapy  DAILY TREATMENT     [facility] [place]  [address], [address]  [facility] [address]  Phone:  [phone]  Fax:  [phone]    Date: 02/12/2018    Patient: Nancie Joyner  YOB: 1945  MRN: [id number]     Time Calculation  Start time: 0850  Stop time: 0930 Time Calculation (min): 40 minutes     Chief Complaint: Back Problem and Hip Problem    Visit #: 3    SUBJECTIVE:  Patient reports that she no longer has hip pain. States that this resolved shortly after last visit.     OBJECTIVE:  Current objective measures: No change in pain with extension or flexion    Now able to transfer without an increase in hip pain         Therapeutic Exercises (CPT 85748):     Total treatment time spent on Therapeutic Exercises: 30 minutes.      1. Basic TrA edu, x5min    2. Basic TrA with leg lift R only, 2x10    3. Basic TrA with BKFO, x20    4. Plunger, 2x10    5. Log roll training, x10min, demostration and patient participation required Leslie to CGA    6. Sit ot stand training, x5min (2x10)    7. Seated on DD with leg lift, x20    Therapeutic Treatments and Modalities:     1. Neuromuscular Re-education (CPT 46571), 8 minutes, balance, 1/2 foam roll balance a-p weight shifts      Pain rating before treatment: 4 LBP 0 hip pain  Pain rating after treatment: 4 LBP 0 hip pain    ASSESSMENT:   Response to treatment: Patient responded well with only a temporary increase in pain once she was supine (LBP). Patient is demonstrating an increased ability to stabilize with activity.     PLAN/RECOMMENDATIONS:   Plan for treatment: therapy treatment to continue next visit.  Planned interventions for next visit: progress stabilization program and increase balance challenge.

## 2018-02-15 ENCOUNTER — PHYSICAL THERAPY (OUTPATIENT)
Dept: PHYSICAL THERAPY | Facility: REHABILITATION | Age: 73
End: 2018-02-15
Attending: INTERNAL MEDICINE
Payer: MEDICARE

## 2018-02-15 DIAGNOSIS — R26.89 DECREASED MOBILITY: ICD-10-CM

## 2018-02-15 DIAGNOSIS — M25.552 LEFT HIP PAIN: ICD-10-CM

## 2018-02-15 DIAGNOSIS — M54.42 LEFT-SIDED LOW BACK PAIN WITH LEFT-SIDED SCIATICA, UNSPECIFIED CHRONICITY: ICD-10-CM

## 2018-02-15 PROCEDURE — 97110 THERAPEUTIC EXERCISES: CPT

## 2018-02-15 PROCEDURE — 97112 NEUROMUSCULAR REEDUCATION: CPT

## 2018-02-15 NOTE — OP THERAPY DAILY TREATMENT
Outpatient Physical Therapy  DAILY TREATMENT     [facility] [place]  [address]  [facility] [address]  Phone:  [phone]  Fax:  [phone]    Date: 02/15/2018    Patient: Nancie Joyner  YOB: 1945  MRN: [id number]     Time Calculation  Start time: 0900  Stop time: 0930 Time Calculation (min): 30 minutes     Chief Complaint: Back Problem and Hip Problem    Visit #: 4    SUBJECTIVE:  Patient reports that she is doing better. Notes that the pain down her leg is not as intense. States that overall she has less pain with transfers.     OBJECTIVE:  Current objective measures: Patient now able to balance for 5 seconds on 1/2 foam roll         Therapeutic Exercises (CPT 24605):     Total treatment time spent on Therapeutic Exercises: 20 minutes.      1. Basic TrA edu, x5min    2. Basic TrA with leg lift R only seated on foam pad, 2x10    3. Basic tra marching, x20    4. Plunger, 2x10    5. Bike , DNT    6. Sit ot stand training, x5min (2x10)    7. Seated on DD with leg lift, x20    8. HS stretch, [id number]    Therapeutic Treatments and Modalities:     1. Neuromuscular Re-education (CPT 88540), 8 minutes, balance, 1/2 foam roll balance a-p weight shifts      Pain rating before treatment: 2  Pain rating after treatment: 1    ASSESSMENT:   Response to treatment: Patient demonstrated a slight improvemtn in balance. Patient demonstrated an increased ability to lift left leg with decreased pain.     PLAN/RECOMMENDATIONS:   Plan for treatment: therapy treatment to continue next visit.  Planned interventions for next visit: progress balance exercise.

## 2018-02-17 ENCOUNTER — OFFICE VISIT (OUTPATIENT)
Dept: URGENT CARE | Facility: PHYSICIAN GROUP | Age: 73
End: 2018-02-17
Payer: MEDICARE

## 2018-02-17 VITALS
WEIGHT: 200 LBS | HEART RATE: 72 BPM | RESPIRATION RATE: 16 BRPM | BODY MASS INDEX: 35.44 KG/M2 | OXYGEN SATURATION: 96 % | HEIGHT: 63 IN | SYSTOLIC BLOOD PRESSURE: 142 MMHG | TEMPERATURE: 97.8 F | DIASTOLIC BLOOD PRESSURE: 88 MMHG

## 2018-02-17 DIAGNOSIS — J06.9 UPPER RESPIRATORY INFECTION WITH COUGH AND CONGESTION: ICD-10-CM

## 2018-02-17 DIAGNOSIS — J01.90 ACUTE RHINOSINUSITIS: ICD-10-CM

## 2018-02-17 LAB
FLUAV+FLUBV AG SPEC QL IA: NORMAL
INT CON NEG: NEGATIVE
INT CON POS: POSITIVE

## 2018-02-17 PROCEDURE — 87804 INFLUENZA ASSAY W/OPTIC: CPT | Performed by: NURSE PRACTITIONER

## 2018-02-17 PROCEDURE — 99214 OFFICE O/P EST MOD 30 MIN: CPT | Performed by: NURSE PRACTITIONER

## 2018-02-17 RX ORDER — GUAIFENESIN 600 MG/1
600 TABLET, EXTENDED RELEASE ORAL EVERY 12 HOURS
COMMUNITY
End: 2018-04-17

## 2018-02-17 RX ORDER — DOXYCYCLINE HYCLATE 100 MG
100 TABLET ORAL 2 TIMES DAILY
Qty: 14 TAB | Refills: 0 | Status: SHIPPED | OUTPATIENT
Start: 2018-02-17 | End: 2018-02-24

## 2018-02-17 NOTE — PROGRESS NOTES
Chief Complaint   Patient presents with   • Nasal Congestion     cough, sore throat        HISTORY OF PRESENT ILLNESS: Patient is a 72 y.o. female who presents today due to symptoms which started yesterday with sudden onset. Pt reports fatigue, malaise, chills, body aches, dry cough, nasal congestion. Denies blood in sputum, chest pain, shortness of breath, wheezing, nausea, vomiting, or diarrhea. Admits to history of both pneumonia and sinus infections, states her symptoms feel like sinusitis. Has tried OTC cold/flu medications without significant relief of symptoms. No recent ABX use. No other aggravating or alleviating factors. She is here today with her , both provide the history.     Patient Active Problem List    Diagnosis Date Noted   • Pain of left hip joint 01/25/2018   • Cellulitis of umbilicus 10/10/2017   • Chronic pain of both knees 10/10/2017   • Left leg pain 07/17/2017   • CKD (chronic kidney disease) stage 3, GFR 30-59 ml/min 08/01/2016   • Fatigue 08/01/2016   • Osteopenia 08/01/2016   • Vitamin B12 deficiency 08/01/2016   • Varicose veins 08/01/2016   • Seasonal allergies 08/01/2016   • UI (urinary incontinence) 08/01/2016   • Vitamin D deficiency 08/01/2016   • Epilepsy (CMS-[facility]) 08/01/2016       Allergies:Patient has no known allergies.    Current Outpatient Prescriptions Ordered in [facility]   Medication Sig Dispense Refill   • guaiFENesin LA (MUCINEX) 600 MG TABLET SR 12 HR Take 600 mg by mouth every 12 hours.     • aspirin effervescent (IKE-SELTZER) 325 MG Effer Tab Take 1 Tab by mouth every 6 hours as needed.     • Calcium-Magnesium-Vitamin D (CALCIUM 500 PO) Take  by mouth.     • cyclobenzaprine (FLEXERIL) 5 MG tablet Take 1-2 Tabs by mouth 3 times a day as needed. 60 Tab 0   • phenytoin ER (DILANTIN) 100 MG Cap Take 5 Caps by mouth every day. 450 Cap 1   • fluticasone (FLONASE) 50 MCG/ACT nasal spray INHALE 1 OR 2 SPRAYS INTO NOSE EVERY DAY 16 g 2   • Multiple Vitamin (MULTI-VITAMINS  PO) Take  by mouth.     • Cholecalciferol (VITAMIN D PO) Take  by mouth.     • Cyanocobalamin (VITAMIN B12 PO) Take  by mouth.       No current Epic-ordered facility-administered medications on file.        Past Medical History:   Diagnosis Date   • CKD (chronic kidney disease) stage 3, GFR 30-59 ml/min 2016   • Epilepsy (CMS-HCC) 2016   • Osteopenia 2016   • Seasonal allergies 2016   • Seizure disorder (CMS-HCC)    • UI (urinary incontinence) 2016   • Varicose veins 2016   • Vitamin B12 deficiency 2016   • Vitamin D deficiency 2016       Social History   Substance Use Topics   • Smoking status: Former Smoker     Packs/day: 0.50     Years: 2.00     Types: Cigarettes   • Smokeless tobacco: Never Used   • Alcohol use No       Family Status   Relation Status   • Sister Alive   • Father  at age 90    LUNG CANCER   • Mother  at age 39    STOMACH CANCER   • Sister Alive   • Sister  at age 80    HEART ISSUES   • Brother  at age 65    PANCREATIC CANCER   • Paternal Aunt    • Brother      Family History   Problem Relation Age of Onset   • Breast Cancer Sister    • Cancer Sister      sinus   • Lung Cancer Father    • Cancer Mother      STOMACH   • Heart Disease Sister    • Heart Disease Sister    • Other Paternal Aunt      EPILEPSY   • Cancer Brother      PANCREATIC       ROS:  Review of Systems   Constitutional: Positive for chills, fatigue, malaise. Negative for weight loss.  HENT: Positive for congestion and sore throat. Negative for ear pain and neck pain.    Eyes: Negative for vision changes.   Cardiovascular: Negative for chest pain, palpitations, orthopnea and leg swelling.   Respiratory: Positive for cough without sputum production. Negative for shortness of breath and wheezing.   Gastrointestinal: Negative for abdominal pain, nausea, vomiting or diarrhea.   Skin: Negative for rash, diaphoresis.     Exam:  Blood pressure 142/88, pulse 72, temperature 36.6  "°C (97.8 °F), resp. rate 16, height 1.6 m (5' 3\"), weight 90.7 kg (200 lb), SpO2 96 %.  General: well-nourished, well-developed female, appears uncomfortable, non-toxic in appearance.   Head: normocephalic, atraumatic.  Eyes: PERRLA, EOM within normal limits, no conjunctival injection, no scleral icterus, visual fields and acuity grossly intact.  Ears: normal shape and symmetry, no tenderness, no discharge. External canals are without any significant edema or erythema. Tympanic membranes are without any inflammation, no effusion. Gross auditory acuity is intact.  Nose: symmetrical without tenderness, mild discharge, bloody, erythema present bilateral nares. Maxillary sinus pressure.   Mouth/Throat: reasonable hygiene, no exudates or tonsillar enlargement. Erythema present.   Neck: no masses, range of motion within normal limits, no tracheal deviation.  Lymph: mild cervical adenopathy. No supraclavicular adenopathy.   Neuro: alert and oriented. Cranial nerves 1-12 grossly intact.   Cardiovascular: regular rate and regular rhythm without murmurs, rubs, or gallops. No edema.   Pulmonary: no distress. Chest is symmetrical with respiration, no wheezes, crackles, or rhonchi.   Musculoskeletal: appropriate muscle tone, gait is stable.  Skin: warm, dry, intact, no clubbing, no cyanosis.   Psych: appropriate mood, affect, judgement.         Assessment/Plan:  1. Upper respiratory infection with cough and congestion     2. Acute rhinosinusitis  POCT Influenza A/B    doxycycline (VIBRAMYCIN) 100 MG Tab         POC flu negative            Rest, increase fluid intake, hand and respiratory hygiene. Increase fluid intake, humidifier at night, hot steam showers, sleep with HOB elevated. Flonase as directed. Contingent antibiotic prescription of Doxycyline given to patient to fill upon meeting criteria of guidelines discussed.   Supportive care, differential diagnoses, and indications for immediate follow-up discussed with patient. "   Pathogenesis of diagnosis discussed including typical length and natural progression.   Instructed to return to clinic or nearest emergency department for any change in condition, further concerns, or worsening of symptoms.  Patient states understanding of the plan of care and discharge instructions.  Instructed to make an appointment, for follow up, with their primary care provider.        HUSSEIN Underwood.

## 2018-02-20 ENCOUNTER — APPOINTMENT (OUTPATIENT)
Dept: PHYSICAL THERAPY | Facility: REHABILITATION | Age: 73
End: 2018-02-20
Attending: INTERNAL MEDICINE
Payer: MEDICARE

## 2018-02-22 ENCOUNTER — APPOINTMENT (OUTPATIENT)
Dept: PHYSICAL THERAPY | Facility: REHABILITATION | Age: 73
End: 2018-02-22
Attending: INTERNAL MEDICINE
Payer: MEDICARE

## 2018-02-26 ENCOUNTER — PHYSICAL THERAPY (OUTPATIENT)
Dept: PHYSICAL THERAPY | Facility: REHABILITATION | Age: 73
End: 2018-02-26
Attending: INTERNAL MEDICINE
Payer: MEDICARE

## 2018-02-26 DIAGNOSIS — R26.89 DECREASED MOBILITY: ICD-10-CM

## 2018-02-26 DIAGNOSIS — M25.552 LEFT HIP PAIN: ICD-10-CM

## 2018-02-26 DIAGNOSIS — M54.42 LEFT-SIDED LOW BACK PAIN WITH LEFT-SIDED SCIATICA, UNSPECIFIED CHRONICITY: ICD-10-CM

## 2018-02-26 PROCEDURE — G8979 MOBILITY GOAL STATUS: HCPCS | Mod: CK

## 2018-02-26 PROCEDURE — G8980 MOBILITY D/C STATUS: HCPCS | Mod: CK

## 2018-02-26 PROCEDURE — 97110 THERAPEUTIC EXERCISES: CPT

## 2018-02-26 NOTE — OP THERAPY DISCHARGE SUMMARY
Outpatient Physical Therapy  DISCHARGE SUMMARY NOTE      [facility] [place]  [address]  [place] [address]  Phone:  [phone]  Fax:  [phone]    Date of Visit: 02/26/2018    Patient: Nancie Jonyer  YOB: 1945  MRN: [id number]     Referring Provider: Luc Marie M.D.  [address]  [place] [address]   Referring Diagnosis Pain in left hip [M25.552]     Physical Therapy Occurrence Codes    Date of onset of impairment:  1/15/18   Date physical therapy care plan established or reviewed:  2/5/18   Date physical therapy treatment started:  2/5/18          Functional Limitation G-Codes and Severity Modifiers  PT Functional Assessment Tool Used: LEFS  PT Functional Assessment Score: 40/80  Goal: Mobility: Goal (): CK   Discharge: Mobility: Discharge (): CK       Your patient is being discharged from Physical Therapy with the following comments:   · Goals met    Comments:  Patient has returned to pre-in baselines. Patient will now continue with her home exercise program. Patient understand her program and was able to demonstrate this.      Limitations Remaining:  Minor intermittent lower back pain    Recommendations:  Discharge to home exercise program.     Lexa Dalton, PT, DPT    Date: 2/26/2018

## 2018-02-26 NOTE — LETTER
February 26, 2018    Luc Marie M.D.  [address]  [place] [address]      Re:  Nancie Joyner          Dear Dr. Marie,    It was a pleasure seeing your patient, Nancie Joyner, on 2/26/2018 for her final therapy visit.     Please find enclosed a copy of the patient's discharge summary from outpatient physical therapy services.          On behalf of the staff at [facility], we would like to thank you for your referrals.  We appreciate your confidence in our clinic and will continue to work hard to provide the best outcomes for your patients.    We sincerely enjoy treating your patients and hope that you have been happy with their care.  Thank you again, and please call with any questions, concerns or ways that we can best meet your needs.        Sincerely,          Lexa Dalton, PT, DPT    No Recipients              Outpatient Physical Therapy  DISCHARGE SUMMARY NOTE      [facility]  [address]  [facility] [address]  Phone:  [phone]  Fax:  [phone]    Date of Visit: 02/26/2018    Patient: Nancie Joyner  YOB: 1945  MRN: [id number]     Referring Provider: Luc Marie M.D.  [address]  [address]  [place] [address]   Referring Diagnosis Pain in left hip [M25.552]     Physical Therapy Occurrence Codes    Date of onset of impairment:  1/15/18   Date physical therapy care plan established or reviewed:  2/5/18   Date physical therapy treatment started:  2/5/18          Functional Limitation G-Codes and Severity Modifiers  PT Functional Assessment Tool Used: LEFS  PT Functional Assessment Score: 40/80  Goal: Mobility: Goal (): CK   Discharge: Mobility: Discharge (): CK       Your patient is being discharged from Physical Therapy with the following comments:   · Goals met    Comments:  Patient has returned to pre-in baselines. Patient will now continue with her home exercise program.  Patient understand her program and was able to demonstrate this.      Limitations Remaining:  Minor intermittent lower back pain    Recommendations:  Discharge to home exercise program.     Lexa Dalton PT, DPT    Date: 2/26/2018

## 2018-02-26 NOTE — OP THERAPY DAILY TREATMENT
Outpatient Physical Therapy  DAILY TREATMENT     [facility] Outpatient Physical Therapy [place]  [address]  [place] [facility] [address]  Phone:  [phone]  Fax:  [phone]    Date: 02/26/2018    Patient: Nancie Joyner  YOB: 1945  MRN: [id number]     Time Calculation  Start time: 0900  Stop time: 0925 Time Calculation (min): 25 minutes     Chief Complaint: Hip Problem    Visit #: 5    SUBJECTIVE:  Patient reports that her hip pain has resolved patient reports an overall increase in ROM and decrease in pain. States she is able to participate in all pre deficit tasks.     OBJECTIVE:  Current objective measures: MMT 5/5 LE , able to lift objects from floor  PT Functional Assessment Tool Used: LEFS  Short Term Goals:   1) Patient's hip abd strength by a half muscle grade to improve standing tolerance. MET   2) Patient's symptoms will decrease to allow for ambulation >300ft for community events. MET  Short term goal time span:  4-6 weeks      Long Term Goals:    1) Patient's symptoms will improve to allow for flexion to lift objects from knee level for ADL. MET  2) Patient's LEFS score will improve by 15 points to demonstrate improved functional mobility. Partially met    Therapeutic Exercises (CPT 40512):     1. Basic TrA edu, x5min, HEP    2. Corner balance eyes closed, x15    3. Basic tra marching, x20, HEP    4. Plunger, 2x10, HEP    5. Seated hip abd L3, 2x10, HEP    6. Sit ot stand training, 2x10, HEP    7. Seated on DD with leg lift, x20, HEP on pillow    8. HS stretch, [id number], HEP    9. DE: marching in place, x15    10. DE: minis squats, x15    11. DE: heel lifts , x15      Time-based treatments/modalities:  Therapeutic exercise minutes (CPT 02131): 25 minutes       Pain rating before treatment: 0  Pain rating after treatment: 0    ASSESSMENT:   Response to treatment: Patient has met 3/4 goals. Patient has improved in all measures. Patient will now be discharged to a home program.      PLAN/RECOMMENDATIONS:   Plan for treatment: discharge patient due to accomplished goals.  Planned interventions for next visit: discharge to [facility].

## 2018-03-13 ENCOUNTER — NON-PROVIDER VISIT (OUTPATIENT)
Dept: NEUROLOGY | Facility: MEDICAL CENTER | Age: 73
End: 2018-03-13
Payer: MEDICARE

## 2018-03-13 DIAGNOSIS — G40.909 SEIZURE DISORDER (HCC): ICD-10-CM

## 2018-03-13 PROCEDURE — 95951 PR EEG MONITORING/VIDEORECORD: CPT | Mod: 52 | Performed by: PSYCHIATRY & NEUROLOGY

## 2018-03-13 NOTE — PROCEDURES
VIDEO ELECTROENCEPHALOGRAM REPORT        Referring provider: DEBBIE Michelle.      DOS: 3/13/2018 (total recording of 26 minutes).      INDICATION:  Nancie Joyner 72 y.o. female presenting with history of seizures.      CURRENT ANTIEPILEPTIC REGIMEN: phenytoin ER 500mg qhs.     TECHNIQUE: 30 channel video electroencephalogram (EEG) was performed in accordance with the international 10-20 system. The study was reviewed in bipolar and referential montages. The recording examined the patient during wakeful and drowsy state(s).      DESCRIPTION OF THE RECORD:  During the wakefulness, the background showed a symmetrical 12 Hz alpha activity posteriorly with amplitude of 70 mV.  There was reactivity to eye closure/opening.  A normal anterior-posterior gradient was noted with faster beta frequencies seen anteriorly.  During drowsiness, theta/delta frequencies were seen.        ACTIVATION PROCEDURES:   Intermittent Photic stimulation was performed in a stepwise fashion from 1 to 30 Hz, but failed to elicit any significant changes in background.      ICTAL AND/OR INTERICTAL FINDINGS:   Frequent right temporal sharps.  No clinical events or seizures were reported or recorded during the study.      EKG: sampling of the EKG recording demonstrated sinus rhythm.      EVENTS:  No clinical events reported.      INTERPRETATION:  This is an abnormal video EEG recording in the awake and drowsy state(s).  Frequent right temporal sharps noted. The findings increase risk for seizures and suggest underlying area of cortical irritability and structural abnormality. Clinical and radiological correlation is recommended.           Everett Faith MD   Epilepsy and Neurodiagnostics.   Clinical  of Neurology [facility].   Diplomate in Neurology, Epilepsy, and Electrodiagnostic Medicine.   Office: [phone]  Fax: [phone]       COLTON FOWLER    DD:  03/13/2018  16:03:22  DT:  03/13/2018 16:35:25    D#:  [id number]  Job#:  [id number]    cc: VASHTI LEWIS

## 2018-03-13 NOTE — PROGRESS NOTES
VIDEO ELECTROENCEPHALOGRAM REPORT      Referring provider: DEBBIE Michelle.     DOS: 3/13/2018 (total recording of 26 minutes).     INDICATION:  Nancie Joyner 72 y.o. female presenting with history of seizures.     CURRENT ANTIEPILEPTIC REGIMEN: phenytoin ER 500mg qhs.    TECHNIQUE: 30 channel video electroencephalogram (EEG) was performed in accordance with the international 10-20 system. The study was reviewed in bipolar and referential montages. The recording examined the patient during wakeful and drowsy state(s).     DESCRIPTION OF THE RECORD:  During the wakefulness, the background showed a symmetrical 12 Hz alpha activity posteriorly with amplitude of 70 mV.  There was reactivity to eye closure/opening.  A normal anterior-posterior gradient was noted with faster beta frequencies seen anteriorly.  During drowsiness, theta/delta frequencies were seen.      ACTIVATION PROCEDURES:   Intermittent Photic stimulation was performed in a stepwise fashion from 1 to 30 Hz, but failed to elicit any significant changes in background.     ICTAL AND/OR INTERICTAL FINDINGS:   Frequent right temporal sharps.  No clinical events or seizures were reported or recorded during the study.     EKG: sampling of the EKG recording demonstrated sinus rhythm.     EVENTS:  No clinical events reported.     INTERPRETATION:  This is an abnormal video EEG recording in the awake and drowsy state(s).  Frequent right temporal sharps noted. The findings increase risk for seizures and suggest underlying area of cortical irritability and structural abnormality. Clinical and radiological correlation is recommended.        Everett Faith MD   Epilepsy and Neurodiagnostics.   Clinical  of Neurology [facility].   Diplomate in Neurology, Epilepsy, and Electrodiagnostic Medicine.   Office: [phone]  Fax: [phone]

## 2018-03-15 ENCOUNTER — TELEPHONE (OUTPATIENT)
Dept: NEUROLOGY | Facility: MEDICAL CENTER | Age: 73
End: 2018-03-15

## 2018-03-22 ENCOUNTER — HOSPITAL ENCOUNTER (OUTPATIENT)
Dept: LAB | Facility: MEDICAL CENTER | Age: 73
End: 2018-03-22
Attending: NURSE PRACTITIONER
Payer: MEDICARE

## 2018-03-22 DIAGNOSIS — G40.802 OTHER EPILEPSY WITHOUT STATUS EPILEPTICUS, NOT INTRACTABLE (HCC): ICD-10-CM

## 2018-03-22 LAB — PHENYTOIN SERPL-MCNC: 9.3 UG/ML (ref 10–20)

## 2018-03-22 PROCEDURE — 36415 COLL VENOUS BLD VENIPUNCTURE: CPT

## 2018-03-22 PROCEDURE — 80185 ASSAY OF PHENYTOIN TOTAL: CPT

## 2018-03-29 ENCOUNTER — TELEPHONE (OUTPATIENT)
Dept: MEDICAL GROUP | Facility: PHYSICIAN GROUP | Age: 73
End: 2018-03-29

## 2018-03-30 NOTE — TELEPHONE ENCOUNTER
----- Message from Ioana Denise D.O. sent at 3/27/2018  3:29 PM PDT -----  Please advise pt that the Dilantin levels are slightly below the recommended range. Please find out if the patient is doing well and not having any seizures.    Ioana Denise D.O.

## 2018-04-17 ENCOUNTER — OFFICE VISIT (OUTPATIENT)
Dept: MEDICAL GROUP | Facility: PHYSICIAN GROUP | Age: 73
End: 2018-04-17
Payer: MEDICARE

## 2018-04-17 VITALS
HEIGHT: 63 IN | DIASTOLIC BLOOD PRESSURE: 74 MMHG | BODY MASS INDEX: 35.08 KG/M2 | TEMPERATURE: 97.2 F | WEIGHT: 198 LBS | SYSTOLIC BLOOD PRESSURE: 126 MMHG | OXYGEN SATURATION: 92 % | HEART RATE: 82 BPM

## 2018-04-17 DIAGNOSIS — G40.802 OTHER EPILEPSY WITHOUT STATUS EPILEPTICUS, NOT INTRACTABLE (HCC): ICD-10-CM

## 2018-04-17 DIAGNOSIS — R30.0 DYSURIA: ICD-10-CM

## 2018-04-17 DIAGNOSIS — J30.1 SEASONAL ALLERGIC RHINITIS DUE TO POLLEN, UNSPECIFIED CHRONICITY: ICD-10-CM

## 2018-04-17 DIAGNOSIS — N30.00 ACUTE CYSTITIS WITHOUT HEMATURIA: ICD-10-CM

## 2018-04-17 DIAGNOSIS — L57.0 AK (ACTINIC KERATOSIS): ICD-10-CM

## 2018-04-17 PROCEDURE — 99214 OFFICE O/P EST MOD 30 MIN: CPT | Performed by: FAMILY MEDICINE

## 2018-04-17 PROCEDURE — 81002 URINALYSIS NONAUTO W/O SCOPE: CPT | Performed by: FAMILY MEDICINE

## 2018-04-17 RX ORDER — FLUTICASONE PROPIONATE 50 MCG
SPRAY, SUSPENSION (ML) NASAL
Qty: 16 G | Refills: 7 | Status: SHIPPED | OUTPATIENT
Start: 2018-04-17 | End: 2018-11-26

## 2018-04-17 RX ORDER — CIPROFLOXACIN 500 MG/1
500 TABLET, FILM COATED ORAL 2 TIMES DAILY
Qty: 14 TAB | Refills: 0 | Status: SHIPPED | OUTPATIENT
Start: 2018-04-17 | End: 2018-04-24

## 2018-04-17 NOTE — ASSESSMENT & PLAN NOTE
Ongoing issues; patient currently on Flonase daily and reports no symptom issues at this time; denies any nasal pain or change in smell with use of medication

## 2018-04-17 NOTE — ASSESSMENT & PLAN NOTE
New Problem. Patient reports that she has had pain with urination in the last 3 days; she is also had some mild suprapubic pain; she denies any fevers or chills; denies any nausea or vomiting. Did take over-the-counter Azo which was only mildly beneficial.

## 2018-04-17 NOTE — ASSESSMENT & PLAN NOTE
Ongoing issues; patient reports that she is doing well with Dilantin 500 mg daily; as well as it was neurology and no medication changes were made. Recent Dilantin level was subtherapeutic but she has not had any issues since her last appointment.

## 2018-04-18 LAB
APPEARANCE UR: NORMAL
BILIRUB UR STRIP-MCNC: NEGATIVE MG/DL
COLOR UR AUTO: YELLOW
GLUCOSE UR STRIP.AUTO-MCNC: NEGATIVE MG/DL
KETONES UR STRIP.AUTO-MCNC: NEGATIVE MG/DL
LEUKOCYTE ESTERASE UR QL STRIP.AUTO: NORMAL
NITRITE UR QL STRIP.AUTO: POSITIVE
PH UR STRIP.AUTO: 6 [PH] (ref 5–8)
PROT UR QL STRIP: 30 MG/DL
RBC UR QL AUTO: NORMAL
SP GR UR STRIP.AUTO: 1.01
UROBILINOGEN UR STRIP-MCNC: NEGATIVE MG/DL

## 2018-10-23 ENCOUNTER — OFFICE VISIT (OUTPATIENT)
Dept: MEDICAL GROUP | Facility: PHYSICIAN GROUP | Age: 73
End: 2018-10-23
Payer: MEDICARE

## 2018-10-23 VITALS
TEMPERATURE: 98.2 F | BODY MASS INDEX: 35.08 KG/M2 | SYSTOLIC BLOOD PRESSURE: 118 MMHG | WEIGHT: 198 LBS | HEIGHT: 63 IN | DIASTOLIC BLOOD PRESSURE: 66 MMHG | HEART RATE: 69 BPM | OXYGEN SATURATION: 96 %

## 2018-10-23 DIAGNOSIS — I83.813 VARICOSE VEINS OF BOTH LOWER EXTREMITIES WITH PAIN: ICD-10-CM

## 2018-10-23 DIAGNOSIS — J30.2 SEASONAL ALLERGIES: ICD-10-CM

## 2018-10-23 DIAGNOSIS — G40.802 OTHER EPILEPSY WITHOUT STATUS EPILEPTICUS, NOT INTRACTABLE (HCC): ICD-10-CM

## 2018-10-23 DIAGNOSIS — G89.29 CHRONIC PAIN OF BOTH KNEES: ICD-10-CM

## 2018-10-23 DIAGNOSIS — M25.561 CHRONIC PAIN OF BOTH KNEES: ICD-10-CM

## 2018-10-23 DIAGNOSIS — M25.562 CHRONIC PAIN OF BOTH KNEES: ICD-10-CM

## 2018-10-23 PROCEDURE — 99214 OFFICE O/P EST MOD 30 MIN: CPT | Performed by: FAMILY MEDICINE

## 2018-10-23 ASSESSMENT — PATIENT HEALTH QUESTIONNAIRE - PHQ9: CLINICAL INTERPRETATION OF PHQ2 SCORE: 0

## 2018-10-23 NOTE — ASSESSMENT & PLAN NOTE
Ongoing issue.  Patient continues to have pain associated with her varicose veins.  She states that the pain is aching in nature; feels like a heaviness in her legs.  Pain comes and goes; has no association with time of day.  She does not take anything for the pain.  She does have some associated leg swelling at the end of the day.

## 2018-10-23 NOTE — ASSESSMENT & PLAN NOTE
Ongoing issue.  Patient continues to have achy pain in both of her knees.  When asked, patient states that she is not getting any regular daily exercise.  Her  is present and states that he is trying to get her to go on a daily walk.  I have also encouraged patient to get at least 10 minutes of walking and on a daily basis to help with her joint pain.  She currently describes the pain is aching in nature; denies any swelling; denies any radiation of the pain

## 2018-10-23 NOTE — PROGRESS NOTES
Subjective:   Nancie Joyner is a 73 y.o. female here today for varicose veins, knee pain, allergies, epilepsy    Varicose veins of both lower extremities with pain  Ongoing issue.  Patient continues to have pain associated with her varicose veins.  She states that the pain is aching in nature; feels like a heaviness in her legs.  Pain comes and goes; has no association with time of day.  She does not take anything for the pain.  She does have some associated leg swelling at the end of the day.    Seasonal allergies  Ongoing issues; patient continues to use Flonase.  She currently is not using any oral medication.  Have recommended over-the-counter Allegra daily for the next 2-3 weeks.  Patient denies any fevers or cough but does report postnasal drip.    Epilepsy  Ongoing issues; patient continues to see neurology; continues on Dilantin without any medication or dose change    Chronic pain of both knees  Ongoing issue.  Patient continues to have achy pain in both of her knees.  When asked, patient states that she is not getting any regular daily exercise.  Her  is present and states that he is trying to get her to go on a daily walk.  I have also encouraged patient to get at least 10 minutes of walking and on a daily basis to help with her joint pain.  She currently describes the pain is aching in nature; denies any swelling; denies any radiation of the pain     Patient is present with her     Current medicines (including changes today)  Current Outpatient Prescriptions   Medication Sig Dispense Refill   • phenytoin ER (DILANTIN) 100 MG Cap TAKE 5 CAPSULES DAILY 450 Cap 1   • fluticasone (FLONASE) 50 MCG/ACT nasal spray INHALE 1 OR 2 SPRAYS INTO NOSE EVERY DAY 16 g 7   • Calcium-Magnesium-Vitamin D (CALCIUM 500 PO) Take  by mouth.     • cyclobenzaprine (FLEXERIL) 5 MG tablet Take 1-2 Tabs by mouth 3 times a day as needed. 60 Tab 0   • Multiple Vitamin (MULTI-VITAMINS PO) Take  by mouth.    "  • Cholecalciferol (VITAMIN D PO) Take  by mouth.     • Cyanocobalamin (VITAMIN B12 PO) Take  by mouth.       No current facility-administered medications for this visit.      She  has a past medical history of CKD (chronic kidney disease) stage 3, GFR 30-59 ml/min ([facility]) (8/1/2016); Epilepsy ([facility]) (8/1/2016); Osteopenia (8/1/2016); Seasonal allergies (8/1/2016); Seizure disorder ([facility]); UI (urinary incontinence) (8/1/2016); Varicose veins (8/1/2016); Vitamin B12 deficiency (8/1/2016); and Vitamin D deficiency (8/1/2016).    ROS   No chest pain, no shortness of breath, no abdominal pain  + Postnasal drip     Objective:     Blood pressure 118/66, pulse 69, temperature 36.8 °C (98.2 °F), height 1.6 m (5' 3\"), weight 89.8 kg (198 lb), SpO2 96 %, not currently breastfeeding. Body mass index is 35.07 kg/m².   Physical Exam:  Alert, oriented in no acute distress.  Eye contact is good, speech goal directed, affect calm  HEENT: conjunctiva non-injected, sclera non-icteric.  Pinna normal. Oral mucous membranes pink and moist with no lesions.  Neck No adenopathy or masses in the neck or supraclavicular regions.  Lungs: clear to auscultation bilaterally with good excursion.  CV: regular rate and rhythm.  Abdomen: soft, nontender, No CVAT  Ext: no edema, color normal, vascularity normal, temperature normal        Assessment and Plan:   The following treatment plan was discussed   1. Chronic pain of both knees      Stable.  Strongly encouraged patient to go on a 10-20-minute walk daily; monitor   2. Seasonal allergies      Uncontrolled; continue Flonase; add over-the-counter Allegra daily for symptom management   3. Varicose veins of both lower extremities with pain      Uncontrolled; strongly encouraged patient to get in a 10-20-minute walk daily; monitor   4. Other epilepsy without status epilepticus, not intractable ([facility])      Stable.  Continue follow-up with neurology as directed; continue medication; monitor       Followup: " Return in about 6 months (around 4/23/2019), or if symptoms worsen or fail to improve, for labs, Short.

## 2018-10-23 NOTE — ASSESSMENT & PLAN NOTE
Ongoing issues; patient continues to see neurology; continues on Dilantin without any medication or dose change

## 2018-10-23 NOTE — ASSESSMENT & PLAN NOTE
Ongoing issues; patient continues to use Flonase.  She currently is not using any oral medication.  Have recommended over-the-counter Allegra daily for the next 2-3 weeks.  Patient denies any fevers or cough but does report postnasal drip.

## 2018-11-06 ENCOUNTER — HOSPITAL ENCOUNTER (OUTPATIENT)
Dept: RADIOLOGY | Facility: MEDICAL CENTER | Age: 73
End: 2018-11-06
Attending: FAMILY MEDICINE
Payer: MEDICARE

## 2018-11-06 DIAGNOSIS — Z12.39 SCREENING BREAST EXAMINATION: ICD-10-CM

## 2018-11-06 PROCEDURE — 77067 SCR MAMMO BI INCL CAD: CPT

## 2018-11-26 ENCOUNTER — OFFICE VISIT (OUTPATIENT)
Dept: NEUROLOGY | Facility: MEDICAL CENTER | Age: 73
End: 2018-11-26
Payer: MEDICARE

## 2018-11-26 VITALS
DIASTOLIC BLOOD PRESSURE: 76 MMHG | TEMPERATURE: 97.8 F | BODY MASS INDEX: 34.91 KG/M2 | SYSTOLIC BLOOD PRESSURE: 138 MMHG | HEIGHT: 63 IN | WEIGHT: 197 LBS | OXYGEN SATURATION: 91 % | HEART RATE: 88 BPM

## 2018-11-26 DIAGNOSIS — G40.802 OTHER EPILEPSY WITHOUT STATUS EPILEPTICUS, NOT INTRACTABLE (HCC): ICD-10-CM

## 2018-11-26 DIAGNOSIS — M85.80 OSTEOPENIA, UNSPECIFIED LOCATION: ICD-10-CM

## 2018-11-26 PROCEDURE — 99213 OFFICE O/P EST LOW 20 MIN: CPT | Performed by: NURSE PRACTITIONER

## 2018-11-26 RX ORDER — PHENYTOIN SODIUM 100 MG/1
CAPSULE, EXTENDED RELEASE ORAL
Qty: 450 CAP | Refills: 3 | Status: SHIPPED | OUTPATIENT
Start: 2018-11-26 | End: 2020-01-13 | Stop reason: SDUPTHER

## 2018-11-26 ASSESSMENT — ENCOUNTER SYMPTOMS
CONSTITUTIONAL NEGATIVE: 1
DIARRHEA: 0
BACK PAIN: 1
ABDOMINAL PAIN: 0
SORE THROAT: 0
VOMITING: 0
NAUSEA: 0
DOUBLE VISION: 0
NERVOUS/ANXIOUS: 0
COUGH: 1
DEPRESSION: 0
HEADACHES: 0

## 2018-11-26 NOTE — PROGRESS NOTES
Subjective:      Nancie Joyner is a 73 y.o. female who presents with Follow-Up (seizure disorder)            HPI  Here with her  today.  Overall doing very well.  She is sick with a cold and primarily concerned about the cold symptoms.  Her  is also sick with the cold.    No concern for seizures or unusual events.  She has had no episodes of lapse of awareness or alteration in memory.    Does not wish to make a change with the phenytoin and is well informed regarding risk and side effects.     Ref. Range 3/22/2018 06:12   Phenytoin Latest Ref Range: 10.0 - 20.0 ug/mL 9.3 (L)     3/2017 DEXA:  According to the World Health Organization classification, bone mineral density of this patient is normal.     3/2018: INTERPRETATION:  This is an abnormal video EEG recording in the awake and drowsy state(s).  Frequent right temporal sharps noted. The findings increase risk for seizures and suggest underlying area of cortical irritability and structural abnormality. Clinical and radiological correlation is recommended.    Current Outpatient Prescriptions on File Prior to Visit   Medication Sig Dispense Refill   • phenytoin ER (DILANTIN) 100 MG Cap TAKE 5 CAPSULES DAILY 450 Cap 1   • fluticasone (FLONASE) 50 MCG/ACT nasal spray INHALE 1 OR 2 SPRAYS INTO NOSE EVERY DAY 16 g 7   • Calcium-Magnesium-Vitamin D (CALCIUM 500 PO) Take  by mouth.     • cyclobenzaprine (FLEXERIL) 5 MG tablet Take 1-2 Tabs by mouth 3 times a day as needed. 60 Tab 0   • Multiple Vitamin (MULTI-VITAMINS PO) Take  by mouth.     • Cholecalciferol (VITAMIN D PO) Take  by mouth.     • Cyanocobalamin (VITAMIN B12 PO) Take  by mouth.       No current facility-administered medications on file prior to visit.        Review of Systems   Constitutional: Negative.    HENT: Positive for congestion. Negative for hearing loss, nosebleeds and sore throat.         No recent head injury.   Eyes: Negative for double vision.        No new loss of  "vision.   Respiratory: Positive for cough.         No recent lung infections.   Cardiovascular: Negative for chest pain.   Gastrointestinal: Negative for abdominal pain, diarrhea, nausea and vomiting.   Genitourinary: Negative.    Musculoskeletal: Positive for back pain (lower back pain).   Skin: Negative.    Neurological: Negative for headaches.   Endo/Heme/Allergies:        No history of endocrine dysfunction.  No new problems.   Psychiatric/Behavioral: Negative for depression. The patient is not nervous/anxious.         No recent mood changes.          Objective:     /76 (BP Location: Left arm, Patient Position: Sitting, BP Cuff Size: Adult)   Pulse 88   Temp 36.6 °C (97.8 °F) (Temporal)   Ht 1.6 m (5' 3\")   Wt 89.4 kg (197 lb)   SpO2 91%   BMI 34.90 kg/m²      Physical Exam   Constitutional: She is oriented to person, place, and time. She appears well-developed and well-nourished.   HENT:   Head: Normocephalic and atraumatic.   Glasses to read only  Hard of hearing     Eyes: Pupils are equal, round, and reactive to light.   Neck: Normal range of motion.   Cardiovascular: Normal rate and regular rhythm.    Pulmonary/Chest: Effort normal.   Neurological: She is alert and oriented to person, place, and time. She exhibits normal muscle tone. Gait normal.   No observable changes in neurologic status.  See initial new patient examination for details.    Skin: Skin is warm.   Psychiatric: She has a normal mood and affect. She is slowed.   Learning delay                 Assessment/Plan:     History of Epilepsy:  Onset of seizures in her 40's, 35+ years ago.  Seizures concerning for partial seizure type.  Last concern for seizures was March 2017.  No other AED's tried and failed.     3/2018 EEG: Frequent right temporal sharps.    Learning delayed and naive:  Here with a male partner today.     Counseled at length regarding the phenytoin.  She does not wish to entertain a change in AED at this time.  Continue " phenytoin ER 500mg qhs.  Needs to supplement with Calcium 2000mg and Vit D 2000IU per day.     Reviewed EEG results.    Due for another DEXA scan in 2020.     Return for follow-up in one year or sooner if needed.        EDUCATION AND COUNSELING:  -Education was provided to the patient and/or family regarding diagnosis and prognosis. The chronic and unpredictable nature of the condition was discussed. There is increased risk for additional events, which may carry potential for significant injuries and death.    -We reviewed the current antiepileptic regimen. Potential side effects of antiepileptics were discussed at length, including but no limited to: hypersensitivity reactions (rash and others, some of which can be fatal), visual field changes (some of which may be irreversible), glaucoma, diplopia, kidney stones, osteopenia/osteoporosis/bone fractures, hyperthermia/anhydrosis, tremors, ataxia, dizziness, fatigue, increased risk for falls, cardiac arrhythmias/syncope, gastrointestinal (hepatitis, pancreatitis, gastritis, ulcers), gingival hypertrophy, drowsiness, sedation, anxiety/nervousness, increased risk for suicide, increased risk for depression, and psychosis. We reviewed drug-drug interactions and their potential effect on seizure control and medication side effects.    -Patient/family educated on SUDEP (Sudden Death in Epilepsy). Counseling was provided on the importance of strict medication and follow up compliance. The patient understands the risks associated with non-adherence with the medical plan as outlined, including but not limited to an increased risk for breakthrough seizures, which may contribute to injuries, disability, status epilepticus, and even death.    -Counseling was also provided on potential effects of alcohol and other drugs, which may lower seizure threshold and/or affect the metabolism of antiepileptic drugs. I recommend avoidance of alcohol and illegal drugs.  -Recommend proper  hydration, regular exercise, proper sleep hygiene (7-8 hrs of overnight sleep, avoid sleep deprivation).    -I have made the patient aware of mandatory reporting required by the law in the State of [place] regarding episodes of seizures, loss of consciousness, and/or alteration of awareness. The patient and family are responsible for reporting events to the DMV, instructions were provided. The patient verbalized understanding and states she has not been driving. Other seizure precautions were discussed at length, including no diving, no skydiving, no unsupervised swimming, no Jacuzzi or bathing in bathtubs.          Pt agrees with plan.

## 2018-12-11 ENCOUNTER — HOSPITAL ENCOUNTER (OUTPATIENT)
Facility: MEDICAL CENTER | Age: 73
End: 2018-12-11
Attending: PHYSICIAN ASSISTANT
Payer: MEDICARE

## 2018-12-11 ENCOUNTER — OFFICE VISIT (OUTPATIENT)
Dept: URGENT CARE | Facility: PHYSICIAN GROUP | Age: 73
End: 2018-12-11
Payer: MEDICARE

## 2018-12-11 VITALS
DIASTOLIC BLOOD PRESSURE: 76 MMHG | SYSTOLIC BLOOD PRESSURE: 128 MMHG | HEART RATE: 93 BPM | TEMPERATURE: 97.1 F | RESPIRATION RATE: 18 BRPM | BODY MASS INDEX: 32.78 KG/M2 | HEIGHT: 64 IN | WEIGHT: 192 LBS | OXYGEN SATURATION: 93 %

## 2018-12-11 DIAGNOSIS — R05.9 COUGH: ICD-10-CM

## 2018-12-11 DIAGNOSIS — N30.00 ACUTE CYSTITIS WITHOUT HEMATURIA: ICD-10-CM

## 2018-12-11 LAB
APPEARANCE UR: NORMAL
BILIRUB UR STRIP-MCNC: NEGATIVE MG/DL
COLOR UR AUTO: YELLOW
GLUCOSE UR STRIP.AUTO-MCNC: NEGATIVE MG/DL
KETONES UR STRIP.AUTO-MCNC: NEGATIVE MG/DL
LEUKOCYTE ESTERASE UR QL STRIP.AUTO: NORMAL
NITRITE UR QL STRIP.AUTO: NEGATIVE
PH UR STRIP.AUTO: 6 [PH] (ref 5–8)
PROT UR QL STRIP: NEGATIVE MG/DL
RBC UR QL AUTO: NEGATIVE
SP GR UR STRIP.AUTO: 1.01
UROBILINOGEN UR STRIP-MCNC: 0.2 MG/DL

## 2018-12-11 PROCEDURE — 87186 SC STD MICRODIL/AGAR DIL: CPT

## 2018-12-11 PROCEDURE — 87077 CULTURE AEROBIC IDENTIFY: CPT

## 2018-12-11 PROCEDURE — 81002 URINALYSIS NONAUTO W/O SCOPE: CPT | Performed by: PHYSICIAN ASSISTANT

## 2018-12-11 PROCEDURE — 87086 URINE CULTURE/COLONY COUNT: CPT

## 2018-12-11 PROCEDURE — 99214 OFFICE O/P EST MOD 30 MIN: CPT | Performed by: PHYSICIAN ASSISTANT

## 2018-12-11 RX ORDER — BENZONATATE 100 MG/1
100-200 CAPSULE ORAL 3 TIMES DAILY PRN
Qty: 60 CAP | Refills: 0 | Status: SHIPPED | OUTPATIENT
Start: 2018-12-11 | End: 2018-12-18

## 2018-12-11 RX ORDER — CEFDINIR 300 MG/1
300 CAPSULE ORAL EVERY 12 HOURS
Qty: 10 CAP | Refills: 0 | Status: SHIPPED | OUTPATIENT
Start: 2018-12-11 | End: 2018-12-16

## 2018-12-11 ASSESSMENT — ENCOUNTER SYMPTOMS
SORE THROAT: 0
MYALGIAS: 0
FEVER: 0
DIARRHEA: 0
MUSCULOSKELETAL NEGATIVE: 1
SPUTUM PRODUCTION: 0
ABDOMINAL PAIN: 0
CHILLS: 0
DIZZINESS: 0
VOMITING: 0
SHORTNESS OF BREATH: 0
NAUSEA: 0
COUGH: 1

## 2018-12-11 NOTE — PROGRESS NOTES
"Subjective:      Nancie Joyner is a 73 y.o. female who presents with Dysuria (x3days/[id number])        Patient is accompanied by her .     Dysuria    This is a new problem. The current episode started in the past 7 days (3 days). The problem occurs every urination. The problem has been unchanged. The quality of the pain is described as burning. The pain is at a severity of 1/10. The pain is mild. There is no history of pyelonephritis. Associated symptoms include frequency and urgency. Pertinent negatives include no chills, discharge, hematuria, nausea or vomiting. She has tried nothing for the symptoms. There is no history of catheterization, kidney stones, recurrent UTIs or a urological procedure.     Patient presents to urgent care reporting a 1-2 week history of a mostly dry cough. She states she originally has a sore throat, wheezing, and SOB, but she is now feeling much improved apart from the persistent cough. She started having urinary symptoms of dysuria, frequency, and urgency 3 days ago. No fevers, chills, body aches, nausea, vomiting, abdominal pain, or flank pain.     Review of Systems   Constitutional: Negative for chills and fever.   HENT: Negative for congestion, ear pain and sore throat.    Respiratory: Positive for cough. Negative for sputum production and shortness of breath.    Cardiovascular: Negative for chest pain.   Gastrointestinal: Negative for abdominal pain, diarrhea, nausea and vomiting.   Genitourinary: Positive for dysuria, frequency and urgency. Negative for hematuria.   Musculoskeletal: Negative.  Negative for myalgias.   Skin: Negative for rash.   Neurological: Negative for dizziness.        Objective:     /76   Pulse 93   Temp 36.2 °C (97.1 °F) (Temporal)   Resp 18   Ht 1.626 m (5' 4\")   Wt 87.1 kg (192 lb)   SpO2 93%   BMI 32.96 kg/m²      Physical Exam   Constitutional: She is oriented to person, place, and time. She appears well-developed and " well-nourished. No distress.   HENT:   Head: Normocephalic and atraumatic.   Right Ear: Hearing, tympanic membrane, external ear and ear canal normal.   Left Ear: Hearing, tympanic membrane, external ear and ear canal normal.   Mouth/Throat: Oropharynx is clear and moist. No oropharyngeal exudate, posterior oropharyngeal edema or posterior oropharyngeal erythema.   Eyes: Pupils are equal, round, and reactive to light. Conjunctivae are normal. Right eye exhibits no discharge. Left eye exhibits no discharge.   Neck: Normal range of motion.   Cardiovascular: Normal rate, regular rhythm and normal heart sounds.    No murmur heard.  Pulmonary/Chest: Effort normal and breath sounds normal. No respiratory distress. She has no wheezes.   Abdominal: Soft. Bowel sounds are normal. She exhibits no distension. There is no tenderness. There is no guarding.   No CVAT bilaterally   Musculoskeletal: Normal range of motion.   Lymphadenopathy:     She has no cervical adenopathy.   Neurological: She is alert and oriented to person, place, and time.   Skin: Skin is warm and dry. She is not diaphoretic.   Psychiatric: She has a normal mood and affect. Her behavior is normal.   Nursing note and vitals reviewed.         PMH:  has a past medical history of CKD (chronic kidney disease) stage 3, GFR 30-59 ml/min ([facility]) (8/1/2016); Epilepsy ([facility]) (8/1/2016); Osteopenia (8/1/2016); Seasonal allergies (8/1/2016); Seizure disorder ([facility]); UI (urinary incontinence) (8/1/2016); Varicose veins (8/1/2016); Vitamin B12 deficiency (8/1/2016); and Vitamin D deficiency (8/1/2016).  MEDS:   Current Outpatient Prescriptions:   •  cefdinir (OMNICEF) 300 MG Cap, Take 1 Cap by mouth every 12 hours for 5 days., Disp: 10 Cap, Rfl: 0  •  benzonatate (TESSALON) 100 MG Cap, Take 1-2 Caps by mouth 3 times a day as needed., Disp: 60 Cap, Rfl: 0  •  phenytoin ER (DILANTIN) 100 MG Cap, TAKE 5 CAPSULES Nightly, Disp: 450 Cap, Rfl: 3  •  Calcium-Magnesium-Vitamin D  (CALCIUM 500 PO), Take  by mouth., Disp: , Rfl:   •  Multiple Vitamin (MULTI-VITAMINS PO), Take  by mouth., Disp: , Rfl:   •  Cholecalciferol (VITAMIN D PO), Take  by mouth., Disp: , Rfl:   •  Cyanocobalamin (VITAMIN B12 PO), Take  by mouth., Disp: , Rfl:   ALLERGIES: No Known Allergies  SURGHX:   Past Surgical History:   Procedure Laterality Date   • ABDOMINAL HYSTERECTOMY TOTAL     • HYSTERECTOMY, TOTAL ABDOMINAL     • VEIN STRIPPING       SOCHX:  reports that she has quit smoking. Her smoking use included Cigarettes. She has a 1.00 pack-year smoking history. She has never used smokeless tobacco. She reports that she does not drink alcohol or use drugs.  FH: family history includes Breast Cancer in her sister; Cancer in her brother, mother, and sister; Heart Disease in her sister and sister; Lung Cancer in her father; Other in her paternal aunt.    POCT Urinalysis:  Component Results     Component Value Ref Range & Units Status   POC Color yellow  Negative Final   POC Appearance slightly cloudy  Negative Final   POC Leukocyte Esterase small  Negative Final   POC Nitrites negative  Negative Final   POC Urobiligen 0.2  Negative (0.2) mg/dL Final   POC Protein negative  Negative mg/dL Final   POC Urine PH 6.0  5.0 - 8.0 Final   POC Blood negative  Negative Final   POC Specific Gravity 1.010  <1.005 - >1.030 Final   POC Ketones negative  Negative mg/dL Final   POC Bilirubin negative  Negative mg/dL Final   POC Glucose negative  Negative mg/dL Final   Last Resulted Time   Tue Dec 11, 2018 11:54 AM        Assessment/Plan:     1. Acute cystitis without hematuria  - POCT Urinalysis --> + leuks  - URINE CULTURE(NEW); Future  - cefdinir (OMNICEF) 300 MG Cap; Take 1 Cap by mouth every 12 hours for 5 days.  Dispense: 10 Cap; Refill: 0   - Complete full course of antibiotics as prescribed     - Pt educated on preventative measures for avoiding future UTIs  - Advised to increase fluid intake  - OTC Pyridium (Azo) for  symptomatic relief, advised that it will turn urine orange in color  - Pending urine culture  - ER precautions given regarding pyelonephritis including fevers greater than 101 and, vomiting and dehydration, increased back pain.    2. Cough  - benzonatate (TESSALON) 100 MG Cap; Take 1-2 Caps by mouth 3 times a day as needed.  Dispense: 60 Cap; Refill: 0

## 2018-12-13 ENCOUNTER — TELEPHONE (OUTPATIENT)
Dept: URGENT CARE | Facility: CLINIC | Age: 73
End: 2018-12-13

## 2018-12-13 LAB
BACTERIA UR CULT: ABNORMAL
BACTERIA UR CULT: ABNORMAL
SIGNIFICANT IND 70042: ABNORMAL
SITE SITE: ABNORMAL
SOURCE SOURCE: ABNORMAL

## 2018-12-14 NOTE — TELEPHONE ENCOUNTER
Spoke to patient and informed her of positive urine culture results for E. Coli., susceptible to current course of Cefdinir. Finish full course of antibiotics as instructed. She states understanding and appreciates the phone call.

## 2018-12-18 ENCOUNTER — OFFICE VISIT (OUTPATIENT)
Dept: URGENT CARE | Facility: CLINIC | Age: 73
End: 2018-12-18
Payer: MEDICARE

## 2018-12-18 VITALS
BODY MASS INDEX: 34.15 KG/M2 | DIASTOLIC BLOOD PRESSURE: 84 MMHG | OXYGEN SATURATION: 95 % | HEIGHT: 64 IN | HEART RATE: 74 BPM | TEMPERATURE: 98 F | SYSTOLIC BLOOD PRESSURE: 130 MMHG | RESPIRATION RATE: 16 BRPM | WEIGHT: 200 LBS

## 2018-12-18 DIAGNOSIS — B96.89 ACUTE BACTERIAL BRONCHITIS: ICD-10-CM

## 2018-12-18 DIAGNOSIS — J20.8 ACUTE BACTERIAL BRONCHITIS: ICD-10-CM

## 2018-12-18 PROCEDURE — 99214 OFFICE O/P EST MOD 30 MIN: CPT | Performed by: FAMILY MEDICINE

## 2018-12-18 RX ORDER — PREDNISONE 10 MG/1
30 TABLET ORAL EVERY MORNING
Qty: 21 TAB | Refills: 0 | Status: SHIPPED | OUTPATIENT
Start: 2018-12-18 | End: 2018-12-25

## 2018-12-18 RX ORDER — AMOXICILLIN AND CLAVULANATE POTASSIUM 875; 125 MG/1; MG/1
1 TABLET, FILM COATED ORAL 2 TIMES DAILY
Qty: 14 TAB | Refills: 0 | Status: SHIPPED | OUTPATIENT
Start: 2018-12-18 | End: 2018-12-25

## 2018-12-18 ASSESSMENT — ENCOUNTER SYMPTOMS
SORE THROAT: 1
CHILLS: 0
FOCAL WEAKNESS: 0
COUGH: 1
HEMOPTYSIS: 0
SHORTNESS OF BREATH: 0
ORTHOPNEA: 0
DIZZINESS: 0
FEVER: 0

## 2018-12-18 NOTE — PROGRESS NOTES
Subjective:      Nancie Joyner is a 73 y.o. female who presents with Cough (Pt states she was seen recently, Sx's not improving)      - This is a very pleasant, well and non-toxic appearing 73 y.o. female with complaints of ~3wks w/ coughing and occasional yellow sputum. No NVFC/cp/sob          ALLERGIES:  Patient has no known allergies.     PMH:  Past Medical History:   Diagnosis Date   • CKD (chronic kidney disease) stage 3, GFR 30-59 ml/min ([facility]) 8/1/2016   • Epilepsy ([facility]) 8/1/2016   • Osteopenia 8/1/2016   • Seasonal allergies 8/1/2016   • Seizure disorder ([facility])    • UI (urinary incontinence) 8/1/2016   • Varicose veins 8/1/2016   • Vitamin B12 deficiency 8/1/2016   • Vitamin D deficiency 8/1/2016        MEDS:    Current Outpatient Prescriptions:   •  amoxicillin-clavulanate (AUGMENTIN) 875-125 MG Tab, Take 1 Tab by mouth 2 times a day for 7 days., Disp: 14 Tab, Rfl: 0  •  predniSONE (DELTASONE) 10 MG Tab, Take 3 Tabs by mouth every morning for 7 days., Disp: 21 Tab, Rfl: 0  •  phenytoin ER (DILANTIN) 100 MG Cap, TAKE 5 CAPSULES Nightly, Disp: 450 Cap, Rfl: 3  •  Calcium-Magnesium-Vitamin D (CALCIUM 500 PO), Take  by mouth., Disp: , Rfl:   •  Multiple Vitamin (MULTI-VITAMINS PO), Take  by mouth., Disp: , Rfl:   •  Cholecalciferol (VITAMIN D PO), Take  by mouth., Disp: , Rfl:   •  Cyanocobalamin (VITAMIN B12 PO), Take  by mouth., Disp: , Rfl:     ** I have documented what I find to be significant in regards to past medical, social, family and surgical history  in my HPI or under PMH/PSH/FH review section, otherwise it is contributory **           HPI    Review of Systems   Constitutional: Negative for chills and fever.   HENT: Positive for congestion and sore throat.    Respiratory: Positive for cough. Negative for hemoptysis and shortness of breath.    Cardiovascular: Negative for chest pain and orthopnea.   Neurological: Negative for dizziness and focal weakness.          Objective:     /84    "Pulse 74   Temp 36.7 °C (98 °F)   Resp 16   Ht 1.626 m (5' 4\")   Wt 90.7 kg (200 lb)   SpO2 95%   BMI 34.33 kg/m²      Physical Exam   Constitutional: She appears well-developed. No distress.   HENT:   Head: Normocephalic and atraumatic.   Mouth/Throat: Oropharynx is clear and moist.   Eyes: Conjunctivae are normal.   Neck: Neck supple.   Cardiovascular: Regular rhythm.    No murmur heard.  Pulmonary/Chest: Effort normal and breath sounds normal. No respiratory distress.   Neurological: She is alert. She exhibits normal muscle tone.   Skin: Skin is warm and dry.   Psychiatric: She has a normal mood and affect. Judgment normal.   Nursing note and vitals reviewed.              Assessment/Plan:         1. Acute bacterial bronchitis  amoxicillin-clavulanate (AUGMENTIN) 875-125 MG Tab    predniSONE (DELTASONE) 10 MG Tab             Dx & d/c instructions discussed w/ patient and/or family members.     ER precautions (worsening signs symptoms and when to go to ER) discussed.    Follow up w/ PCP in 2-3 days to make sure symptoms improving and no further intervention/treatment and/or work-up needed was advised, ER if feeling worse or not improving in 2 days.    Possible side effects (i.e. Rash, GI upset/constipation, sedation, elevation of BP or sugars) of any medications given discussed.     Patient left in stable condition            "

## 2018-12-31 ENCOUNTER — OFFICE VISIT (OUTPATIENT)
Dept: URGENT CARE | Facility: CLINIC | Age: 73
End: 2018-12-31
Payer: MEDICARE

## 2018-12-31 ENCOUNTER — APPOINTMENT (OUTPATIENT)
Dept: RADIOLOGY | Facility: IMAGING CENTER | Age: 73
End: 2018-12-31
Attending: NURSE PRACTITIONER
Payer: MEDICARE

## 2018-12-31 VITALS
RESPIRATION RATE: 16 BRPM | WEIGHT: 200 LBS | OXYGEN SATURATION: 90 % | HEIGHT: 64 IN | BODY MASS INDEX: 34.15 KG/M2 | DIASTOLIC BLOOD PRESSURE: 82 MMHG | HEART RATE: 83 BPM | TEMPERATURE: 97.4 F | SYSTOLIC BLOOD PRESSURE: 128 MMHG

## 2018-12-31 DIAGNOSIS — J06.9 PROTRACTED URI: ICD-10-CM

## 2018-12-31 DIAGNOSIS — R06.2 WHEEZING: ICD-10-CM

## 2018-12-31 PROCEDURE — 99214 OFFICE O/P EST MOD 30 MIN: CPT | Performed by: NURSE PRACTITIONER

## 2018-12-31 PROCEDURE — 71046 X-RAY EXAM CHEST 2 VIEWS: CPT | Mod: 26 | Performed by: NURSE PRACTITIONER

## 2018-12-31 RX ORDER — AZITHROMYCIN 250 MG/1
TABLET, FILM COATED ORAL
Qty: 6 TAB | Refills: 0 | Status: SHIPPED | OUTPATIENT
Start: 2018-12-31 | End: 2019-01-04

## 2018-12-31 RX ORDER — IPRATROPIUM BROMIDE AND ALBUTEROL SULFATE 2.5; .5 MG/3ML; MG/3ML
3 SOLUTION RESPIRATORY (INHALATION) ONCE
Status: COMPLETED | OUTPATIENT
Start: 2018-12-31 | End: 2018-12-31

## 2018-12-31 RX ORDER — ALBUTEROL SULFATE 90 UG/1
2 AEROSOL, METERED RESPIRATORY (INHALATION) EVERY 6 HOURS PRN
Qty: 8.5 G | Refills: 0 | Status: SHIPPED | OUTPATIENT
Start: 2018-12-31 | End: 2019-01-10

## 2018-12-31 RX ADMIN — IPRATROPIUM BROMIDE AND ALBUTEROL SULFATE 3 ML: 2.5; .5 SOLUTION RESPIRATORY (INHALATION) at 08:26

## 2018-12-31 ASSESSMENT — ENCOUNTER SYMPTOMS
SHORTNESS OF BREATH: 0
WHEEZING: 1
HEMOPTYSIS: 0
SINUS PAIN: 0
COUGH: 1
FEVER: 0
WEAKNESS: 0
CHILLS: 0
SPUTUM PRODUCTION: 1
DIAPHORESIS: 0
SORE THROAT: 0

## 2019-01-01 NOTE — PROGRESS NOTES
"Subjective:      Nancie Joyner is a 73 y.o. female who presents with Cough (Congestion / Cough X 1 Month)            Patient comes in today with a 5 history of cough that is productive for yellow sputum.  She notes chest tightness with no chest pain.  No fever, chills, shortness of breath, or night sweats.  She has tried a course of Cefdinir (for a concurrent URI, UTI resolved now) and then a course of Augmentin with minimal relief.  She did improve when taking the Augmentin but symptoms quickly returned.  No asthma or COPD.  Remote history of smoking for 1 year as a young adult.   She has chronic nasal congestion, worse at night and uses saline nasal spray.  No sinus pain.          Review of Systems   Constitutional: Positive for malaise/fatigue. Negative for chills, diaphoresis and fever.   HENT: Positive for congestion. Negative for ear pain, sinus pain and sore throat.    Respiratory: Positive for cough, sputum production and wheezing. Negative for hemoptysis and shortness of breath.    Neurological: Negative for weakness.     Medications, Allergies, and current problem list reviewed today in Epic     Objective:     /82 (BP Location: Right arm, Patient Position: Sitting, BP Cuff Size: Large adult)   Pulse 83   Temp 36.3 °C (97.4 °F) (Temporal)   Resp 16   Ht 1.626 m (5' 4\")   Wt 90.7 kg (200 lb)   SpO2 90%   BMI 34.33 kg/m²      Physical Exam   Constitutional: She is oriented to person, place, and time. She appears well-developed and well-nourished. No distress.   HENT:   Head: Normocephalic.   Right Ear: External ear normal.   Left Ear: External ear normal.   Nose: Nose normal.   Mouth/Throat: Oropharynx is clear and moist. No oropharyngeal exudate.   Eyes: Pupils are equal, round, and reactive to light. Conjunctivae are normal. Right eye exhibits no discharge. Left eye exhibits no discharge. No scleral icterus.   Neck: Neck supple. No JVD present. No tracheal deviation present. No " thyromegaly present.   Cardiovascular: Normal rate, regular rhythm and normal heart sounds.  Exam reveals no gallop and no friction rub.    No murmur heard.  Pulmonary/Chest: Effort normal. No stridor. No respiratory distress. She has wheezes. She has no rales. She exhibits no tenderness.   Scattered wheezes through upper fields, more in the right.   Musculoskeletal: She exhibits no edema.   Lymphadenopathy:     She has no cervical adenopathy.   Neurological: She is alert and oriented to person, place, and time.   Skin: Skin is warm and dry. No rash noted. She is not diaphoretic. No erythema.   Vitals reviewed.    In clinic medication: duoneb.  Patient tolerated well.  Lungs clear to auscultation.  O2 sat up to 96 % post treatment.     [address] [id number] Nancie Joyner  MRN: [id number], : 1945, Sex: F  Encounter date: 2018   Order   DX-CHEST-2 VIEWS [[id number]] (Order [id number])   Reviewed by MOUNA Ramirez on 2018  9:21 AM   Images     Show images for DX-CHEST-2 VIEWS   View SMATOOS     DX-CHEST-2 VIEWS (Order #[id number]) on 18   Narrative       2018 9:02 AM    HISTORY/REASON FOR EXAM:  Cough  Cough, congestion and SOB x 1 month    TECHNIQUE/EXAM DESCRIPTION AND NUMBER OF VIEWS:  Two views of the chest.    COMPARISON:  None.    FINDINGS:    No pulmonary infiltrates or consolidations are noted.  No pleural effusions, no pneumothorax are appreciated.  Normal cardiopericardial silhouette.     Impression         1. No active cardiopulmonary abnormalities are identified.   Reading Provider Reading Date   Renaldo Geiger M.D. Dec 31, 2018   Signing Provider Signing Date Signing Time   Renaldo Geiger M.D. Dec 31, 2018  9:07 AM   Lab Information     Lab   RADIOLOGY              Last Resulted Time   Mon Dec 31, 2018  9:09 AM   Detailed Information     Priority and Order Details           Collection Information     Resulting  Agency: [facility]      Order-Level Documents:     There are no order-level documents.   Order Detail Report     DX-CHEST-2 VIEWS (Order #[id number]) on 12/31/18   Order-Level Documents:     There are no order-level documents.   Encounter-Level Documents:     There are no encounter-level documents.   Order-Level Documents for Parent Order:     There are no order-level documents for parent order.   Encounter-Level Documents for Parent Order:     There are no encounter-level documents for parent order.   DX-CHEST-2 VIEWS [[id number]]     Electronically signed by: MOUNA Esquivel on 12/31/18 0822 Status: Completed   Ordering user: MOUNA Esquivel 12/31/18 0822 Authorized by: MOUNA Esquivel   Ordered during: Office Visit on 12/31/2018   Frequency:  12/31/18 -     Diagnoses  Protracted URI [J06.9]   Questionnaire     Question Answer   Reason For Exam: Cough   Comments to Radiology productive cough x 1 month          Assessment/Plan:     1. Protracted URI  - DX-CHEST-2 VIEWS; Future  - azithromycin (ZITHROMAX) 250 MG Tab; 2 tabs po as a single dose on day 1, then 1 tab po daily on days 2-5.  Dispense: 6 Tab; Refill: 0    2. Wheezing  - ipratropium-albuterol (DUONEB) nebulizer solution; 3 mL by Nebulization route Once.  - albuterol 108 (90 Base) MCG/ACT Aero Soln inhalation aerosol; Inhale 2 Puffs by mouth every 6 hours as needed for Shortness of Breath for up to 10 days.  Dispense: 8.5 g; Refill: 0    Discussed exam findings with patient.  Differential reviewed.  Take full course of antibiotics.  Albuterol as prescribed.  Proper use and technique reviewed.  Maintain adequate po hydration.  RTC in 7-10 days if symptoms persist, sooner if worse.  ED precautions discussed.  Patient verbalized understanding of and agreed with plan of care.

## 2019-03-28 ENCOUNTER — OFFICE VISIT (OUTPATIENT)
Dept: MEDICAL GROUP | Facility: PHYSICIAN GROUP | Age: 74
End: 2019-03-28
Payer: MEDICARE

## 2019-03-28 VITALS
DIASTOLIC BLOOD PRESSURE: 68 MMHG | TEMPERATURE: 97.3 F | HEIGHT: 63 IN | BODY MASS INDEX: 35.44 KG/M2 | WEIGHT: 200 LBS | SYSTOLIC BLOOD PRESSURE: 124 MMHG | OXYGEN SATURATION: 95 % | HEART RATE: 87 BPM

## 2019-03-28 DIAGNOSIS — G89.29 CHRONIC BILATERAL LOW BACK PAIN WITHOUT SCIATICA: ICD-10-CM

## 2019-03-28 DIAGNOSIS — E66.09 CLASS 2 OBESITY DUE TO EXCESS CALORIES WITHOUT SERIOUS COMORBIDITY WITH BODY MASS INDEX (BMI) OF 35.0 TO 35.9 IN ADULT: ICD-10-CM

## 2019-03-28 DIAGNOSIS — M54.50 CHRONIC BILATERAL LOW BACK PAIN WITHOUT SCIATICA: ICD-10-CM

## 2019-03-28 DIAGNOSIS — D22.9 CHANGING NEVUS: ICD-10-CM

## 2019-03-28 DIAGNOSIS — N39.41 URGE INCONTINENCE OF URINE: ICD-10-CM

## 2019-03-28 DIAGNOSIS — M85.80 OSTEOPENIA, UNSPECIFIED LOCATION: ICD-10-CM

## 2019-03-28 DIAGNOSIS — G89.29 CHRONIC PAIN OF BOTH KNEES: ICD-10-CM

## 2019-03-28 DIAGNOSIS — E78.5 DYSLIPIDEMIA: ICD-10-CM

## 2019-03-28 DIAGNOSIS — J30.2 SEASONAL ALLERGIES: ICD-10-CM

## 2019-03-28 DIAGNOSIS — N18.30 CKD (CHRONIC KIDNEY DISEASE) STAGE 3, GFR 30-59 ML/MIN (HCC): ICD-10-CM

## 2019-03-28 DIAGNOSIS — M25.561 CHRONIC PAIN OF BOTH KNEES: ICD-10-CM

## 2019-03-28 DIAGNOSIS — E55.9 VITAMIN D DEFICIENCY: ICD-10-CM

## 2019-03-28 DIAGNOSIS — E53.8 VITAMIN B12 DEFICIENCY: ICD-10-CM

## 2019-03-28 DIAGNOSIS — G40.802 OTHER EPILEPSY WITHOUT STATUS EPILEPTICUS, NOT INTRACTABLE (HCC): ICD-10-CM

## 2019-03-28 DIAGNOSIS — M25.562 CHRONIC PAIN OF BOTH KNEES: ICD-10-CM

## 2019-03-28 DIAGNOSIS — Z78.0 POSTMENOPAUSAL: ICD-10-CM

## 2019-03-28 DIAGNOSIS — Z23 NEED FOR VACCINATION: ICD-10-CM

## 2019-03-28 PROBLEM — E66.812 CLASS 2 OBESITY DUE TO EXCESS CALORIES WITH BODY MASS INDEX (BMI) OF 35.0 TO 35.9 IN ADULT: Status: ACTIVE | Noted: 2019-03-28

## 2019-03-28 PROBLEM — L03.316 CELLULITIS OF UMBILICUS: Status: RESOLVED | Noted: 2017-10-10 | Resolved: 2019-03-28

## 2019-03-28 PROBLEM — M79.605 LEFT LEG PAIN: Status: RESOLVED | Noted: 2017-07-17 | Resolved: 2019-03-28

## 2019-03-28 PROBLEM — M25.552 PAIN OF LEFT HIP JOINT: Status: RESOLVED | Noted: 2018-01-25 | Resolved: 2019-03-28

## 2019-03-28 PROBLEM — R30.0 DYSURIA: Status: RESOLVED | Noted: 2018-04-17 | Resolved: 2019-03-28

## 2019-03-28 PROCEDURE — G0009 ADMIN PNEUMOCOCCAL VACCINE: HCPCS | Performed by: NURSE PRACTITIONER

## 2019-03-28 PROCEDURE — 99215 OFFICE O/P EST HI 40 MIN: CPT | Mod: 25 | Performed by: NURSE PRACTITIONER

## 2019-03-28 PROCEDURE — 90732 PPSV23 VACC 2 YRS+ SUBQ/IM: CPT | Performed by: NURSE PRACTITIONER

## 2019-03-28 RX ORDER — TIZANIDINE 4 MG/1
4 TABLET ORAL EVERY 6 HOURS PRN
Qty: 30 TAB | Refills: 3 | Status: SHIPPED | OUTPATIENT
Start: 2019-03-28 | End: 2020-01-13

## 2019-03-28 RX ORDER — MELOXICAM 7.5 MG/1
7.5 TABLET ORAL
Qty: 90 TAB | Refills: 3 | Status: SHIPPED | OUTPATIENT
Start: 2019-03-28 | End: 2020-01-13

## 2019-03-28 ASSESSMENT — PATIENT HEALTH QUESTIONNAIRE - PHQ9: CLINICAL INTERPRETATION OF PHQ2 SCORE: 0

## 2019-03-28 NOTE — ASSESSMENT & PLAN NOTE
This is a chronic problem for the patient that is ongoing.  The patient's weight today is 200 pounds with a BMI of 35.43.  The patient does not participate in regular physical exercise and states that her diet is okay.

## 2019-03-28 NOTE — ASSESSMENT & PLAN NOTE
The patient was diagnosed with osteopenia based on a bone density scan completed on 11/7/2005.  The patient did not have a repeat bone density scan until 3/3/2017, which revealed a bone mineral density that was normal.  It was advised that the patient repeat her bone density scan in 2 years, she is now due.

## 2019-03-28 NOTE — ASSESSMENT & PLAN NOTE
This is a chronic problem for the patient that is stable.  The patient states that summer season is the worst season for her allergies.  She reports that she has symptoms with dust and [place].  The patient does not use a daily allergy medication, states she only takes medication when her symptoms act up.

## 2019-03-28 NOTE — ASSESSMENT & PLAN NOTE
This is a chronic problem for the patient that is ongoing.  The patient's last lipid profile was completed on 11/3/2015.  The patient does not take any cholesterol-lowering medications.  Lab Results  Component Value Date/Time   CHOLSTRLTOT 188 11/03/2015 0626   TRIGLYCERIDE 116 11/03/2015 0626   HDL 56 11/03/2015 0626    (H) 11/03/2015 0626

## 2019-03-28 NOTE — ASSESSMENT & PLAN NOTE
The patient's last vitamin B12 level was 996 on 6/18/2016.  Prior to that on 11/3/2015 her vitamin B12 level was 344.  The patient continues to take over-the-counter vitamin B12 supplements daily.

## 2019-03-28 NOTE — ASSESSMENT & PLAN NOTE
This is a chronic problem for the patient that is ongoing.  The patient's last lab work was completed on 5/16/2017.    Ref. Range 5/16/2017 06:30   Bun Latest Ref Range: 8 - 22 mg/dL 19   Creatinine Latest Ref Range: 0.50 - 1.40 mg/dL 1.01   GFR If  Latest Ref Range: >60 mL/min/1.73 m 2 >60   GFR If Non  Latest Ref Range: >60 mL/min/1.73 m 2 54 (A)

## 2019-03-28 NOTE — ASSESSMENT & PLAN NOTE
This is a chronic problem for the patient that is ongoing and managed by neurology, Tejal VELIZ.  The patient reports that she has had this issue for at least the last 30 years.  The patient continues to take Dilantin daily and states that her epilepsy is well controlled.  She follows with neurology 1 time per year.

## 2019-03-28 NOTE — ASSESSMENT & PLAN NOTE
This is a chronic problem for the patient that has been ongoing for a couple of years but has been changing over the last year.  The patient is most concerned about a changing nevus on her right cheek.  The patient reports a history of basal cell carcinoma on her left eyelid 4 years ago.  The patient also has lesions on her bilateral arms, chest, and back.  She is requesting a referral to dermatology.

## 2019-03-28 NOTE — ASSESSMENT & PLAN NOTE
This is a chronic problem for the patient that is ongoing.  The patient reports that she will occasionally have urinary incontinence, even after she has been sitting on the toilet voiding.  The patient states that she uses maximum absorbency incontinence pads.  The patient's  is present and states that she has frequent UTIs, but states that they happen approximately 1 time per year.  The patient is currently not experiencing any urinary symptoms.

## 2019-03-28 NOTE — ASSESSMENT & PLAN NOTE
"This is a chronic problem for the patient that is ongoing.  Patient reports that in fourth grade she fell and \"cracked her spine\" and has had low back pain since.  The patient is requesting referral to orthopedics, spine specialist for this issue.  The patient states that when living in [place] she was seeing a spine specialist who was giving her injections that \"helped a lot\".  The patient reports that occasionally her back hurts so bad that she can hardly move and small bumps on the road while driving can aggravate her back pain.  The patient will take over-the-counter Aleve when her pain is bad, approximately 1 Aleve every 3 or 4 days.  The patient is requesting a different medication to help with the pain and muscle spasms.  "

## 2019-03-28 NOTE — PROGRESS NOTES
CC:   Chief Complaint   Patient presents with   • Establish Care   • Referral Needed     Spine Dr and derm        HISTORY OF THE PRESENT ILLNESS: Patient is a 73 y.o. female. This pleasant patient is here today to establish care and discuss multiple issues as listed below.    Health Maintenance: Completed.  Patient declines scheduling annual wellness visit.    Vitamin D deficiency  This is a chronic problem for the patient that is ongoing.  The patient's last vitamin D level was 33 on 5/16/2017.  The patient continues to take over-the-counter vitamin D supplement.    Vitamin B12 deficiency  The patient's last vitamin B12 level was 996 on 6/18/2016.  Prior to that on 11/3/2015 her vitamin B12 level was 344.  The patient continues to take over-the-counter vitamin B12 supplements daily.    Seasonal allergies  This is a chronic problem for the patient that is stable.  The patient states that summer season is the worst season for her allergies.  She reports that she has symptoms with dust and [place].  The patient does not use a daily allergy medication, states she only takes medication when her symptoms act up.    Epilepsy  This is a chronic problem for the patient that is ongoing and managed by neurology, Tejal VELIZ.  The patient reports that she has had this issue for at least the last 30 years.  The patient continues to take Dilantin daily and states that her epilepsy is well controlled.  She follows with neurology 1 time per year.    CKD (chronic kidney disease) stage 3, GFR 30-59 ml/min  This is a chronic problem for the patient that is ongoing.  The patient's last lab work was completed on 5/16/2017.    Ref. Range 5/16/2017 06:30   Bun Latest Ref Range: 8 - 22 mg/dL 19   Creatinine Latest Ref Range: 0.50 - 1.40 mg/dL 1.01   GFR If  Latest Ref Range: >60 mL/min/1.73 m 2 >60   GFR If Non  Latest Ref Range: >60 mL/min/1.73 m 2 54 (A)       UI (urinary incontinence)  This is  "a chronic problem for the patient that is ongoing.  The patient reports that she will occasionally have urinary incontinence, even after she has been sitting on the toilet voiding.  The patient states that she uses maximum absorbency incontinence pads.  The patient's  is present and states that she has frequent UTIs, but states that they happen approximately 1 time per year.  The patient is currently not experiencing any urinary symptoms.    Changing nevus  This is a chronic problem for the patient that has been ongoing for a couple of years but has been changing over the last year.  The patient is most concerned about a changing nevus on her right cheek.  The patient reports a history of basal cell carcinoma on her left eyelid 4 years ago.  The patient also has lesions on her bilateral arms, chest, and back.  She is requesting a referral to dermatology.    Chronic bilateral low back pain without sciatica  This is a chronic problem for the patient that is ongoing.  Patient reports that in fourth grade she fell and \"cracked her spine\" and has had low back pain since.  The patient is requesting referral to orthopedics, spine specialist for this issue.  The patient states that when living in [place] she was seeing a spine specialist who was giving her injections that \"helped a lot\".  The patient reports that occasionally her back hurts so bad that she can hardly move and small bumps on the road while driving can aggravate her back pain.  The patient will take over-the-counter Aleve when her pain is bad, approximately 1 Aleve every 3 or 4 days.  The patient is requesting a different medication to help with the pain and muscle spasms.    Class 2 obesity due to excess calories with body mass index (BMI) of 35.0 to 35.9 in adult  This is a chronic problem for the patient that is ongoing.  The patient's weight today is 200 pounds with a BMI of 35.43.  The patient does not participate in regular physical exercise and " states that her diet is okay.    Chronic pain of both knees  This is a chronic problem for the patient that is ongoing.  The patient continues to report achiness in bilateral knees.  The patient is not getting any regular exercise.  States that she does not take any over-the-counter medication for this pain.    Dyslipidemia  This is a chronic problem for the patient that is ongoing.  The patient's last lipid profile was completed on 11/3/2015.  The patient does not take any cholesterol-lowering medications.  Lab Results  Component Value Date/Time   CHOLSTRLTOT 188 11/03/2015 0626   TRIGLYCERIDE 116 11/03/2015 0626   HDL 56 11/03/2015 0626    (H) 11/03/2015 0626       Osteopenia  The patient was diagnosed with osteopenia based on a bone density scan completed on 11/7/2005.  The patient did not have a repeat bone density scan until 3/3/2017, which revealed a bone mineral density that was normal.  It was advised that the patient repeat her bone density scan in 2 years, she is now due.    Allergies: Patient has no known allergies.    Current Outpatient Prescriptions Ordered in [facility]   Medication Sig Dispense Refill   • Zoster Vac Recomb Adjuvanted (SHINGRIX) 50 MCG/0.5ML Recon Susp 0.5 mL by Intramuscular route Once for 1 dose. 0.5 mL 0   • meloxicam (MOBIC) 7.5 MG Tab Take 1 Tab by mouth 1 time daily as needed for Mild Pain, Moderate Pain or Inflammation. 90 Tab 3   • tizanidine (ZANAFLEX) 4 MG Tab Take 1 Tab by mouth every 6 hours as needed (low back muscle spasm). 30 Tab 3   • phenytoin ER (DILANTIN) 100 MG Cap TAKE 5 CAPSULES Nightly 450 Cap 3   • Calcium-Magnesium-Vitamin D (CALCIUM 500 PO) Take  by mouth.     • Multiple Vitamin (MULTI-VITAMINS PO) Take  by mouth.     • Cholecalciferol (VITAMIN D PO) Take  by mouth.     • Cyanocobalamin (VITAMIN B12 PO) Take  by mouth.       No current Epic-ordered facility-administered medications on file.        Past Medical History:   Diagnosis Date   • Basal cell  carcinoma 2015    left eyelid   • CKD (chronic kidney disease) stage 3, GFR 30-59 ml/min ([facility]) 8/1/2016   • Epilepsy ([facility]) 8/1/2016   • Osteopenia 8/1/2016   • Seasonal allergies 8/1/2016   • Seizure disorder ([facility])    • UI (urinary incontinence) 8/1/2016   • Varicose veins 8/1/2016   • Vitamin B12 deficiency 8/1/2016   • Vitamin D deficiency 8/1/2016       Past Surgical History:   Procedure Laterality Date   • CHOLECYSTECTOMY  1989   • TUBAL LIGATION  1979   • ABDOMINAL HYSTERECTOMY TOTAL     • VEIN STRIPPING         Social History   Substance Use Topics   • Smoking status: Former Smoker     Packs/day: 0.50     Years: 2.00     Types: Cigarettes     Quit date: 3/28/1979   • Smokeless tobacco: Never Used   • Alcohol use No       Social History     Social History Narrative   • No narrative on file       Family History   Problem Relation Age of Onset   • Cancer Sister         sinus   • Lung Cancer Father    • Cancer Mother         STOMACH   • Heart Disease Sister    • Heart Disease Sister    • Cancer Brother         pancreatic cancer   • Seizures Paternal Aunt    • No Known Problems Maternal Grandmother    • No Known Problems Maternal Grandfather    • No Known Problems Paternal Grandmother    • No Known Problems Paternal Grandfather    • Breast Cancer Sister        ROS:   Constitutional:  Negative for fever, chills, unexpected weight change, night sweats, body aches, sleep issues, and fatigue/generalized weakness.   HEENT:  Negative for headaches, vision changes, hearing changes, ear pain, tinnitus, ear discharge, rhinorrhea, sinus congestion, sneezing, sore throat, and neck pain.    Respiratory:  Negative for cough, shortness of breath, sputum production, hemoptysis, chest congestion, dyspnea, wheezing, and crackles.    Cardiovascular:  Negative for chest pain, palpitations, BOLAND, paroxsymal nocturnal dyspnea, orthopnea, and bilateral lower extremity edema.   Gastrointestinal: Positive for occasional constipation.   "Negative for heartburn, nausea, vomiting, abdominal pain, hematochezia, melena, diarrhea, and greasy/foul-smelling stools.   Genitourinary: Positive for urinary incontinence.  Negative for dysuria, nocturia, polyuria, hematuria, pyuria, urinary urgency, urinary frequency.   Musculoskeletal: Positive for chronic low back pain, bilateral knee pain.  Negative for myalgias, back pain, and joint pain.   Skin: Positive for nevus on right cheek, and actinic keratosis on chest, bilateral upper arms, and back.  Neurological: Positive for history of seizures, controlled.  Negative for dizziness, tingling, tremors, focal sensory deficit, focal weakness and headaches.   Endo/Heme/Allergies:  Does not bruise/bleed easily. Denies cold/heat intolerance.   Psychiatric/Behavioral: Negative for depression, suicidal/homicidal ideation and memory loss.        Exam: Blood pressure 124/68, pulse 87, temperature 36.3 °C (97.3 °F), temperature source Temporal, height 1.6 m (5' 3\"), weight 90.7 kg (200 lb), SpO2 95 %, not currently breastfeeding. Body mass index is 35.43 kg/m².    General:  Normal appearing. No distress.  HEENT:  Normocephalic. Eyes conjunctiva clear lids without ptosis, pupils equal and reactive to light accommodation, ears normal shape and contour, canals are clear bilaterally, tympanic membranes are benign, nasal mucosa benign, oropharynx is without erythema, edema or exudates.   Neck:  Supple without JVD or bruit.  Pulmonary:  Clear to ausculation.  Normal effort. No rales, ronchi, or wheezing.  Cardiovascular:  Regular rate and rhythm without murmur. Carotid and radial pulses are intact and equal bilaterally.  Abdomen:  Soft, nontender, nondistended. Normal bowel sounds.  Neurologic:  Grossly nonfocal.  Skin: Positive for nevus on right cheek, and actinic keratosis on chest, bilateral upper arms, and back Warm and dry.   Musculoskeletal:  Normal gait. No extremity cyanosis, clubbing, or edema.  Psych:  Normal mood and " affect. Alert and oriented x3. Judgment and insight is normal.    Patient was seen for at least 40 minutes total face-to-face time with greater than 50% of that time spent on counseling and care coordination.    Assessment/Plan:  1.  2. Chronic bilateral low back pain without sciatica   Chronic pain of both knees meloxicam (MOBIC) 7.5 MG Tab    tizanidine (ZANAFLEX) 4 MG Tab    REFERRAL TO ORTHOPEDICS - per patient request   3. Changing nevus  REFERRAL TO DERMATOLOGY - per patient request   4. CKD (chronic kidney disease) stage 3, GFR 30-59 ml/min ([facility])  Comp Metabolic Panel   5. Other epilepsy without status epilepticus, not intractable ([facility])   continue follow-up with neurology annually, next appointment 11/25/2019   6. Class 2 obesity due to excess calories without serious comorbidity with body mass index (BMI) of 35.0 to 35.9 in adult  Patient's body mass index is 35.43 kg/m². Exercise and nutrition counseling were performed at this visit.     7. Dyslipidemia  Lipid Profile   8. Vitamin D deficiency  VITAMIN D,25 HYDROXY  Continue taking over-the-counter vitamin D supplements   9. Vitamin B12 deficiency  VITAMIN B12  Continue taking over-the-counter vitamin B12 supplements   10. Seasonal allergies   continue taking over-the-counter allergy medication as needed   11. Urge incontinence of urine     12.  13. Osteopenia, unspecified location   Postmenopausal  DS-BONE DENSITY STUDY (DEXA)   14. Need for vaccination  PneumoVax PPV23 =>1yo    Zoster Vac Recomb Adjuvanted (SHINGRIX) 50 MCG/0.5ML Recon Susp     Educated in proper administration of medication(s) ordered today including safety, possible SE, risks, benefits, rationale and alternatives to therapy.   Supportive care, differential diagnoses, and indications for immediate follow-up discussed with patient.    Pathogenesis of diagnosis discussed including typical length and natural progression.    Instructed to return to clinic or nearest emergency department for  any change in condition, further concerns, or worsening of symptoms.  Patient states understanding of the plan of care and discharge instructions.    Return in about 1 year (around 3/28/2020) for Preventative Annual.    I have placed the below orders and discussed them with an approved delegating provider. The MA is performing the below orders under the direction of Dr. Pritchett.    Please note that this dictation was created using voice recognition software. I have made every reasonable attempt to correct obvious errors, but I expect that there are errors of grammar and possibly content that I did not discover before finalizing the note.

## 2019-03-28 NOTE — ASSESSMENT & PLAN NOTE
This is a chronic problem for the patient that is ongoing.  The patient continues to report achiness in bilateral knees.  The patient is not getting any regular exercise.  States that she does not take any over-the-counter medication for this pain.   Hide Include Location In Plan Question?: No Detail Level: Zone Detail Level: Simple

## 2019-04-04 ENCOUNTER — HOSPITAL ENCOUNTER (OUTPATIENT)
Dept: RADIOLOGY | Facility: MEDICAL CENTER | Age: 74
End: 2019-04-04
Attending: NURSE PRACTITIONER
Payer: MEDICARE

## 2019-04-04 DIAGNOSIS — Z78.0 POSTMENOPAUSAL: ICD-10-CM

## 2019-04-04 PROCEDURE — 77080 DXA BONE DENSITY AXIAL: CPT

## 2019-05-28 ENCOUNTER — OFFICE VISIT (OUTPATIENT)
Dept: URGENT CARE | Facility: CLINIC | Age: 74
End: 2019-05-28
Payer: MEDICARE

## 2019-05-28 VITALS
DIASTOLIC BLOOD PRESSURE: 70 MMHG | OXYGEN SATURATION: 91 % | HEART RATE: 82 BPM | RESPIRATION RATE: 14 BRPM | WEIGHT: 204 LBS | HEIGHT: 63 IN | BODY MASS INDEX: 36.14 KG/M2 | SYSTOLIC BLOOD PRESSURE: 126 MMHG | TEMPERATURE: 98.4 F

## 2019-05-28 DIAGNOSIS — J06.9 VIRAL URI WITH COUGH: ICD-10-CM

## 2019-05-28 PROCEDURE — 99214 OFFICE O/P EST MOD 30 MIN: CPT | Performed by: NURSE PRACTITIONER

## 2019-05-28 RX ORDER — BENZONATATE 100 MG/1
100 CAPSULE ORAL 3 TIMES DAILY PRN
Qty: 15 CAP | Refills: 0 | Status: SHIPPED | OUTPATIENT
Start: 2019-05-28 | End: 2019-06-02

## 2019-05-28 ASSESSMENT — ENCOUNTER SYMPTOMS
SINUS PAIN: 0
MYALGIAS: 0
CHILLS: 0
FEVER: 0
SHORTNESS OF BREATH: 0
COUGH: 1
WHEEZING: 0
HEMOPTYSIS: 0
SPUTUM PRODUCTION: 0
DIAPHORESIS: 0
SORE THROAT: 0
WEAKNESS: 0

## 2019-05-28 NOTE — PROGRESS NOTES
"Subjective:      Nancie Joyner is a 73 y.o. female who presents with Cough (x 2 days  / congestion)            Patient comes in today with a 2 day history of nasal congestion and a cough.  Associated factors: fatigue.  Patient has tried OTC vitamin C and cold medicine with some relief.  Denies any fever, chills, myalgias, chest pain or shortness of breath.            Review of Systems   Constitutional: Positive for malaise/fatigue. Negative for chills, diaphoresis and fever.   HENT: Positive for congestion. Negative for ear pain, sinus pain and sore throat.    Respiratory: Positive for cough. Negative for hemoptysis, sputum production, shortness of breath and wheezing.    Cardiovascular: Negative for chest pain.   Musculoskeletal: Negative for myalgias.   Skin: Negative for rash.   Neurological: Negative for weakness.     Medications, Allergies, and current problem list reviewed today in Epic     Objective:     /70 (BP Location: Left arm, Patient Position: Sitting, BP Cuff Size: Large adult)   Pulse 82   Temp 36.9 °C (98.4 °F) (Temporal)   Resp 14   Ht 1.6 m (5' 3\")   Wt 92.5 kg (204 lb)   SpO2 91%   BMI 36.14 kg/m²      Physical Exam   Constitutional: She is oriented to person, place, and time. She appears well-developed and well-nourished. No distress.   HENT:   Head: Normocephalic.   Right Ear: External ear normal.   Left Ear: External ear normal.   Mouth/Throat: Oropharynx is clear and moist. No oropharyngeal exudate.   Nares patent but congested.  Clear nasal drainage.  NO sinus TTP.   Eyes: Pupils are equal, round, and reactive to light. Conjunctivae are normal. Right eye exhibits no discharge. Left eye exhibits no discharge. No scleral icterus.   Neck: Neck supple. No JVD present. No tracheal deviation present. No thyromegaly present.   Cardiovascular: Normal rate, regular rhythm and normal heart sounds.  Exam reveals no gallop and no friction rub.    No murmur heard.  Pulmonary/Chest: " Effort normal and breath sounds normal. No stridor. No respiratory distress. She has no wheezes. She has no rales. She exhibits no tenderness.   Non-productive cough in clinic.   Musculoskeletal: She exhibits no edema.   Lymphadenopathy:     She has no cervical adenopathy.   Neurological: She is alert and oriented to person, place, and time.   Skin: Skin is warm and dry. No rash noted. She is not diaphoretic. No erythema.   Vitals reviewed.              Assessment/Plan:     1. Viral URI with cough  - benzonatate (TESSALON) 100 MG Cap; Take 1 Cap by mouth 3 times a day as needed for up to 5 days.  Dispense: 15 Cap; Refill: 0    Advised patient that based on the history and exam findings, this is likely a self-limiting viral illness.  There is no indication for antibiotics at this time.  Differential reviewed.  Tessalon as prescribed.  OTC NSAIDs or tylenol prn fever, pain.  OTC cold medications prn symptom management.  Maintain adequate po hydration.  RTC in 5-7 days if symptoms persist, sooner if worse.  Patient verbalized understanding of and agreed with plan of care.

## 2019-06-05 ENCOUNTER — OFFICE VISIT (OUTPATIENT)
Dept: URGENT CARE | Facility: PHYSICIAN GROUP | Age: 74
End: 2019-06-05
Payer: MEDICARE

## 2019-06-05 VITALS
TEMPERATURE: 97.2 F | WEIGHT: 204 LBS | DIASTOLIC BLOOD PRESSURE: 70 MMHG | OXYGEN SATURATION: 92 % | HEIGHT: 63 IN | BODY MASS INDEX: 36.14 KG/M2 | HEART RATE: 68 BPM | SYSTOLIC BLOOD PRESSURE: 100 MMHG

## 2019-06-05 DIAGNOSIS — J40 BRONCHITIS: ICD-10-CM

## 2019-06-05 DIAGNOSIS — R05.3 PERSISTENT COUGH: ICD-10-CM

## 2019-06-05 PROCEDURE — 99214 OFFICE O/P EST MOD 30 MIN: CPT | Performed by: PHYSICIAN ASSISTANT

## 2019-06-05 RX ORDER — PREDNISONE 20 MG/1
TABLET ORAL
Qty: 10 TAB | Refills: 0 | Status: SHIPPED | OUTPATIENT
Start: 2019-06-05 | End: 2020-01-13

## 2019-06-05 RX ORDER — CODEINE PHOSPHATE AND GUAIFENESIN 10; 100 MG/5ML; MG/5ML
SOLUTION ORAL
Qty: 140 ML | Refills: 0 | Status: SHIPPED | OUTPATIENT
Start: 2019-06-05 | End: 2019-06-12

## 2019-06-05 RX ORDER — IPRATROPIUM BROMIDE AND ALBUTEROL SULFATE 2.5; .5 MG/3ML; MG/3ML
3 SOLUTION RESPIRATORY (INHALATION) ONCE
Status: COMPLETED | OUTPATIENT
Start: 2019-06-05 | End: 2019-06-05

## 2019-06-05 RX ORDER — DOXYCYCLINE HYCLATE 100 MG/1
100 CAPSULE ORAL 2 TIMES DAILY
Qty: 14 CAP | Refills: 0 | Status: SHIPPED | OUTPATIENT
Start: 2019-06-05 | End: 2019-06-12

## 2019-06-05 RX ADMIN — IPRATROPIUM BROMIDE AND ALBUTEROL SULFATE 3 ML: 2.5; .5 SOLUTION RESPIRATORY (INHALATION) at 12:29

## 2019-06-05 ASSESSMENT — ENCOUNTER SYMPTOMS
HEMOPTYSIS: 0
MYALGIAS: 0
SHORTNESS OF BREATH: 1
WHEEZING: 1
RHINORRHEA: 1
FEVER: 0
HEARTBURN: 0
HEADACHES: 0
CHILLS: 0
SORE THROAT: 0
COUGH: 1

## 2019-06-05 ASSESSMENT — COPD QUESTIONNAIRES: COPD: 0

## 2019-06-05 NOTE — PROGRESS NOTES
Subjective:      Nancie Joyner is a 73 y.o. female who presents with URI (x 3 weeks)            Cough   This is a new problem. The current episode started 1 to 4 weeks ago. The problem has been gradually improving. The cough is productive of sputum. Associated symptoms include nasal congestion, postnasal drip, rhinorrhea, shortness of breath and wheezing. Pertinent negatives include no chest pain, chills, ear congestion, ear pain, fever, headaches, heartburn, hemoptysis, myalgias or sore throat. The symptoms are aggravated by lying down. She has tried prescription cough suppressant for the symptoms. The treatment provided no relief. There is no history of asthma or COPD.     Past Medical History:   Diagnosis Date   • Basal cell carcinoma 2015    left eyelid   • CKD (chronic kidney disease) stage 3, GFR 30-59 ml/min ([facility]) 8/1/2016   • Epilepsy ([facility]) 8/1/2016   • Osteopenia 8/1/2016   • Seasonal allergies 8/1/2016   • Seizure disorder ([facility])    • UI (urinary incontinence) 8/1/2016   • Varicose veins 8/1/2016   • Vitamin B12 deficiency 8/1/2016   • Vitamin D deficiency 8/1/2016     Current Outpatient Prescriptions on File Prior to Visit   Medication Sig Dispense Refill   • phenytoin ER (DILANTIN) 100 MG Cap TAKE 5 CAPSULES Nightly 450 Cap 3   • Calcium-Magnesium-Vitamin D (CALCIUM 500 PO) Take  by mouth.     • Multiple Vitamin (MULTI-VITAMINS PO) Take  by mouth.     • Cholecalciferol (VITAMIN D PO) Take  by mouth.     • Cyanocobalamin (VITAMIN B12 PO) Take  by mouth.     • meloxicam (MOBIC) 7.5 MG Tab Take 1 Tab by mouth 1 time daily as needed for Mild Pain, Moderate Pain or Inflammation. 90 Tab 3   • tizanidine (ZANAFLEX) 4 MG Tab Take 1 Tab by mouth every 6 hours as needed (low back muscle spasm). 30 Tab 3     No current facility-administered medications on file prior to visit.      Patient has no known allergies.  Family History   Problem Relation Age of Onset   • Cancer Sister         sinus   • Lung  "Cancer Father    • Cancer Mother         STOMACH   • Heart Disease Sister    • Heart Disease Sister    • Cancer Brother         pancreatic cancer   • Seizures Paternal Aunt    • No Known Problems Maternal Grandmother    • No Known Problems Maternal Grandfather    • No Known Problems Paternal Grandmother    • No Known Problems Paternal Grandfather    • Breast Cancer Sister      Social History     Social History   • Marital status:      Spouse name: N/A   • Number of children: N/A   • Years of education: N/A     Occupational History   • Not on file.     Social History Main Topics   • Smoking status: Former Smoker     Packs/day: 0.50     Years: 2.00     Types: Cigarettes     Quit date: 3/28/1979   • Smokeless tobacco: Never Used   • Alcohol use No   • Drug use: No   • Sexual activity: Not Currently      Comment:      Other Topics Concern   • Not on file     Social History Narrative   • No narrative on file         Review of Systems   Constitutional: Negative for chills and fever.   HENT: Positive for postnasal drip and rhinorrhea. Negative for ear pain and sore throat.    Respiratory: Positive for cough, shortness of breath and wheezing. Negative for hemoptysis.    Cardiovascular: Negative for chest pain.   Gastrointestinal: Negative for heartburn.   Musculoskeletal: Negative for myalgias.   Neurological: Negative for headaches.          Objective:     /70   Pulse 68   Temp 36.2 °C (97.2 °F) (Temporal)   Ht 1.6 m (5' 3\")   Wt 92.5 kg (204 lb)   SpO2 92%   BMI 36.14 kg/m²      Physical Exam       Gen.: Patient is A and O ×3, no acute distress, well-nourished well-hydrated  Vitals: Are listed and unremarkable  HEENT: Heads normocephalic, atraumatic, PERRLA, extraocular movements intact, TMs and oropharynx clear  Neck: Soft supple without cervical lymphadenopathy  Cardiovascular: Regular rate and rhythm normal S1 and S2. No murmurs, rubs or gallops  Lungs: end exp wheezes heard and scattered " rhales  Abdomen is soft, nontender, nondistended with good bowel sounds, no hepatosplenomegaly  Skin: Is well perfused without evidence of rash or lesions  Neurological:  cranial nerves II through XII were assessed and intact.  Musculoskeletal: Full range of motion, 5 out of 5 strength against resistance  Neurovascularly: Intact with a 2 second cap refill, good distal pulses       Urgent care course: duonebx 1 given.  Patient re-auscultated and clear lung sounds.  No wheezes rales rhonchi  Assessment/Plan:     1. Persistent cough  - ipratropium-albuterol (DUONEB) nebulizer solution; 3 mL by Nebulization route Once.  - doxycycline (VIBRAMYCIN) 100 MG Cap; Take 1 Cap by mouth 2 times a day for 7 days.  Dispense: 14 Cap; Refill: 0  - predniSONE (DELTASONE) 20 MG Tab; Take one pill twice a day for five days  Dispense: 10 Tab; Refill: 0  - guaifenesin-codeine (CHERATUSSIN AC) Solution oral solution; Take one teaspoon every six hours as needed for cough for up to seven days  Dispense: 140 mL; Refill: 0    2. Bronchitis  - doxycycline (VIBRAMYCIN) 100 MG Cap; Take 1 Cap by mouth 2 times a day for 7 days.  Dispense: 14 Cap; Refill: 0  - predniSONE (DELTASONE) 20 MG Tab; Take one pill twice a day for five days  Dispense: 10 Tab; Refill: 0  - guaifenesin-codeine (CHERATUSSIN AC) Solution oral solution; Take one teaspoon every six hours as needed for cough for up to seven days  Dispense: 140 mL; Refill: 0    Short interval follow-up recommended.  If symptoms persist or worsen despite treatment please follow-up with us in 48 hours for chest x-ray at that time.  Of note, O2 sat went up to 95% after DuoNeb

## 2019-06-11 ENCOUNTER — APPOINTMENT (RX ONLY)
Dept: URBAN - METROPOLITAN AREA CLINIC 22 | Facility: CLINIC | Age: 74
Setting detail: DERMATOLOGY
End: 2019-06-11

## 2019-06-11 DIAGNOSIS — D22 MELANOCYTIC NEVI: ICD-10-CM

## 2019-06-11 DIAGNOSIS — L57.0 ACTINIC KERATOSIS: ICD-10-CM

## 2019-06-11 DIAGNOSIS — Z85.828 PERSONAL HISTORY OF OTHER MALIGNANT NEOPLASM OF SKIN: ICD-10-CM

## 2019-06-11 DIAGNOSIS — L81.4 OTHER MELANIN HYPERPIGMENTATION: ICD-10-CM

## 2019-06-11 DIAGNOSIS — D18.0 HEMANGIOMA: ICD-10-CM

## 2019-06-11 DIAGNOSIS — L20.89 OTHER ATOPIC DERMATITIS: ICD-10-CM

## 2019-06-11 DIAGNOSIS — L82.1 OTHER SEBORRHEIC KERATOSIS: ICD-10-CM

## 2019-06-11 PROBLEM — L20.84 INTRINSIC (ALLERGIC) ECZEMA: Status: ACTIVE | Noted: 2019-06-11

## 2019-06-11 PROBLEM — D22.5 MELANOCYTIC NEVI OF TRUNK: Status: ACTIVE | Noted: 2019-06-11

## 2019-06-11 PROBLEM — D18.01 HEMANGIOMA OF SKIN AND SUBCUTANEOUS TISSUE: Status: ACTIVE | Noted: 2019-06-11

## 2019-06-11 PROCEDURE — 17000 DESTRUCT PREMALG LESION: CPT

## 2019-06-11 PROCEDURE — ? COUNSELING

## 2019-06-11 PROCEDURE — ? PRESCRIPTION

## 2019-06-11 PROCEDURE — 99203 OFFICE O/P NEW LOW 30 MIN: CPT | Mod: 25

## 2019-06-11 PROCEDURE — ? LIQUID NITROGEN

## 2019-06-11 PROCEDURE — 17003 DESTRUCT PREMALG LES 2-14: CPT

## 2019-06-11 RX ORDER — TRIAMCINOLONE ACETONIDE 1 MG/G
CREAM TOPICAL BID
Qty: 1 | Refills: 1 | Status: ERX | COMMUNITY
Start: 2019-06-11

## 2019-06-11 RX ADMIN — TRIAMCINOLONE ACETONIDE: 1 CREAM TOPICAL at 21:14

## 2019-06-11 ASSESSMENT — LOCATION DETAILED DESCRIPTION DERM
LOCATION DETAILED: RIGHT SUPERIOR NASAL CHEEK
LOCATION DETAILED: SUPERIOR THORACIC SPINE
LOCATION DETAILED: RIGHT INFERIOR CENTRAL MALAR CHEEK
LOCATION DETAILED: LEFT ANKLE
LOCATION DETAILED: RIGHT DISTAL DORSAL FOREARM
LOCATION DETAILED: LEFT INFRAMAMMARY CREASE (INNER QUADRANT)
LOCATION DETAILED: RIGHT VENTRAL PROXIMAL FOREARM
LOCATION DETAILED: LEFT VENTRAL PROXIMAL FOREARM
LOCATION DETAILED: RIGHT MEDIAL MALAR CHEEK
LOCATION DETAILED: LEFT LATERAL SUPERIOR EYELID
LOCATION DETAILED: INFERIOR MID FOREHEAD
LOCATION DETAILED: INFERIOR THORACIC SPINE
LOCATION DETAILED: RIGHT INFRAMAMMARY CREASE (INNER QUADRANT)

## 2019-06-11 ASSESSMENT — LOCATION SIMPLE DESCRIPTION DERM
LOCATION SIMPLE: RIGHT BREAST
LOCATION SIMPLE: LEFT FOREARM
LOCATION SIMPLE: UPPER BACK
LOCATION SIMPLE: LEFT BREAST
LOCATION SIMPLE: LEFT SUPERIOR EYELID
LOCATION SIMPLE: RIGHT FOREARM
LOCATION SIMPLE: LEFT ANKLE
LOCATION SIMPLE: INFERIOR FOREHEAD
LOCATION SIMPLE: RIGHT CHEEK

## 2019-06-11 ASSESSMENT — LOCATION ZONE DERM
LOCATION ZONE: TRUNK
LOCATION ZONE: EYELID
LOCATION ZONE: LEG
LOCATION ZONE: FACE
LOCATION ZONE: ARM

## 2019-07-29 ENCOUNTER — TELEPHONE (OUTPATIENT)
Dept: MEDICAL GROUP | Facility: PHYSICIAN GROUP | Age: 74
End: 2019-07-29

## 2019-07-29 DIAGNOSIS — J30.2 SEASONAL ALLERGIES: ICD-10-CM

## 2019-07-29 RX ORDER — FLUTICASONE PROPIONATE 50 MCG
1 SPRAY, SUSPENSION (ML) NASAL DAILY
Qty: 1 BOTTLE | Refills: 4 | Status: SHIPPED | OUTPATIENT
Start: 2019-07-29 | End: 2020-01-27

## 2019-07-29 RX ORDER — FLUTICASONE PROPIONATE 50 MCG
SPRAY, SUSPENSION (ML) NASAL
COMMUNITY
Start: 2014-11-14 | End: 2019-07-29 | Stop reason: SDUPTHER

## 2019-07-29 NOTE — TELEPHONE ENCOUNTER
Requested Prescriptions     Signed Prescriptions Disp Refills   • fluticasone (FLONASE) 50 MCG/ACT nasal spray 1 Bottle 4     Sig: Spray 1 Spray in nose every day.     Authorizing Provider: ZAHRA ECHOLS A.P.R.N.

## 2019-11-20 ENCOUNTER — OFFICE VISIT (OUTPATIENT)
Dept: URGENT CARE | Facility: PHYSICIAN GROUP | Age: 74
End: 2019-11-20
Payer: MEDICARE

## 2019-11-20 VITALS
SYSTOLIC BLOOD PRESSURE: 102 MMHG | RESPIRATION RATE: 20 BRPM | TEMPERATURE: 97.9 F | OXYGEN SATURATION: 92 % | BODY MASS INDEX: 40.75 KG/M2 | HEART RATE: 79 BPM | DIASTOLIC BLOOD PRESSURE: 68 MMHG | HEIGHT: 63 IN | WEIGHT: 230 LBS

## 2019-11-20 DIAGNOSIS — R05.3 PERSISTENT COUGH: ICD-10-CM

## 2019-11-20 PROCEDURE — 99214 OFFICE O/P EST MOD 30 MIN: CPT | Performed by: PHYSICIAN ASSISTANT

## 2019-11-20 RX ORDER — AZITHROMYCIN 250 MG/1
TABLET, FILM COATED ORAL
Qty: 6 TAB | Refills: 0 | Status: SHIPPED | OUTPATIENT
Start: 2019-11-20 | End: 2020-01-13

## 2019-11-20 ASSESSMENT — ENCOUNTER SYMPTOMS
RHINORRHEA: 1
HEADACHES: 0
COUGH: 1
FEVER: 0
SORE THROAT: 1
SHORTNESS OF BREATH: 1

## 2019-11-25 ENCOUNTER — HOSPITAL ENCOUNTER (OUTPATIENT)
Dept: RADIOLOGY | Facility: MEDICAL CENTER | Age: 74
End: 2019-11-25
Attending: NURSE PRACTITIONER
Payer: MEDICARE

## 2019-11-25 ENCOUNTER — APPOINTMENT (OUTPATIENT)
Dept: NEUROLOGY | Facility: MEDICAL CENTER | Age: 74
End: 2019-11-25
Payer: MEDICARE

## 2019-11-25 ENCOUNTER — APPOINTMENT (OUTPATIENT)
Dept: RADIOLOGY | Facility: MEDICAL CENTER | Age: 74
End: 2019-11-25
Payer: MEDICARE

## 2019-11-25 DIAGNOSIS — Z12.31 SCREENING MAMMOGRAM, ENCOUNTER FOR: ICD-10-CM

## 2019-11-25 PROCEDURE — 77063 BREAST TOMOSYNTHESIS BI: CPT

## 2020-01-13 ENCOUNTER — OFFICE VISIT (OUTPATIENT)
Dept: NEUROLOGY | Facility: MEDICAL CENTER | Age: 75
End: 2020-01-13
Payer: MEDICARE

## 2020-01-13 VITALS
OXYGEN SATURATION: 91 % | DIASTOLIC BLOOD PRESSURE: 76 MMHG | RESPIRATION RATE: 16 BRPM | HEIGHT: 63 IN | HEART RATE: 86 BPM | TEMPERATURE: 98.1 F | WEIGHT: 202 LBS | BODY MASS INDEX: 35.79 KG/M2 | SYSTOLIC BLOOD PRESSURE: 126 MMHG

## 2020-01-13 DIAGNOSIS — G40.802 OTHER EPILEPSY WITHOUT STATUS EPILEPTICUS, NOT INTRACTABLE (HCC): ICD-10-CM

## 2020-01-13 DIAGNOSIS — M85.80 OSTEOPENIA, UNSPECIFIED LOCATION: ICD-10-CM

## 2020-01-13 DIAGNOSIS — G40.909 SEIZURE DISORDER (HCC): ICD-10-CM

## 2020-01-13 PROCEDURE — 99214 OFFICE O/P EST MOD 30 MIN: CPT | Performed by: NURSE PRACTITIONER

## 2020-01-13 RX ORDER — PHENYTOIN SODIUM 100 MG/1
CAPSULE, EXTENDED RELEASE ORAL
Qty: 450 CAP | Refills: 1 | Status: SHIPPED | OUTPATIENT
Start: 2020-01-13 | End: 2020-09-29

## 2020-01-13 ASSESSMENT — ENCOUNTER SYMPTOMS
VOMITING: 0
HEADACHES: 0
NAUSEA: 0
DOUBLE VISION: 0
ABDOMINAL PAIN: 0
NERVOUS/ANXIOUS: 0
SEIZURES: 0
MEMORY LOSS: 1
FALLS: 1
DEPRESSION: 0
SORE THROAT: 0
BACK PAIN: 1
COUGH: 0
WEAKNESS: 1
DIARRHEA: 0

## 2020-01-13 NOTE — PROGRESS NOTES
Subjective:      Nancie Joyner is a 74 y.o. female who presents with Follow-Up (Other epilepsy without status epilepticus, not intractable )            HPI Here with her  today.  Overall doing very well.      No concern for seizures or unusual events.  She has had no episodes of lapse of awareness or alteration in memory.     Does not wish to make a change with the phenytoin and is well informed regarding risk and side effects, particularly that of weakness of the bones.    She did slip and fall in Spring 2019-- with that fall she injured her wrist.  Just recently, she also missed her step stool when going to sit down when dusting her computer.  She bruised her left hip and knee.       Ref. Range 3/22/2018 06:12   Phenytoin Latest Ref Range: 10.0 - 20.0 ug/mL 9.3 (L)      3/2017 DEXA:  According to the World Health Organization classification, bone mineral density of this patient is normal.     3/2018: INTERPRETATION:  This is an abnormal video EEG recording in the awake and drowsy state(s).  Frequent right temporal sharps noted. The findings increase risk for seizures and suggest underlying area of cortical irritability and structural abnormality. Clinical and radiological correlation is recommended.     She goes to physical therapy once a week and does all her exercises at home daily.  Has been told that she has arthritis in her lower back and weak muscles in general.  Some times she spends too much time in her Lazy Boy chair.      Current Outpatient Medications   Medication Sig Dispense Refill   • fluticasone (FLONASE) 50 MCG/ACT nasal spray Spray 1 Spray in nose every day. 1 Bottle 4   • phenytoin ER (DILANTIN) 100 MG Cap TAKE 5 CAPSULES Nightly 450 Cap 3   • Calcium-Magnesium-Vitamin D (CALCIUM 500 PO) Take  by mouth.     • Multiple Vitamin (MULTI-VITAMINS PO) Take  by mouth.     • Cholecalciferol (VITAMIN D PO) Take  by mouth.     • Cyanocobalamin (VITAMIN B12 PO) Take  by mouth.     •  "azithromycin (ZITHROMAX) 250 MG Tab Take two tablets on day one, then on tablet the following four days 6 Tab 0   • predniSONE (DELTASONE) 20 MG Tab Take one pill twice a day for five days (Patient not taking: Reported on 11/20/2019) 10 Tab 0   • meloxicam (MOBIC) 7.5 MG Tab Take 1 Tab by mouth 1 time daily as needed for Mild Pain, Moderate Pain or Inflammation. 90 Tab 3   • tizanidine (ZANAFLEX) 4 MG Tab Take 1 Tab by mouth every 6 hours as needed (low back muscle spasm). 30 Tab 3     No current facility-administered medications for this visit.          Review of Systems   Constitutional: Weight loss: weight gain of 5#   HENT: Negative for hearing loss, nosebleeds and sore throat.         No recent head injury.   Eyes: Negative for double vision.        No new loss of vision.   Respiratory: Negative for cough.         No recent lung infections.   Cardiovascular: Negative for chest pain.   Gastrointestinal: Negative for abdominal pain, diarrhea, nausea and vomiting.   Genitourinary: Negative.    Musculoskeletal: Positive for back pain ( lower back), falls and joint pain (left knee).   Skin: Negative.    Neurological: Positive for weakness (generalized). Negative for seizures and headaches.   Endo/Heme/Allergies:        No history of endocrine dysfunction.  No new problems.   Psychiatric/Behavioral: Positive for memory loss (chronic). Negative for depression. The patient is not nervous/anxious.         No recent mood changes.          Objective:     /76 (BP Location: Right arm, Patient Position: Sitting, BP Cuff Size: Adult)   Pulse 86   Temp 36.7 °C (98.1 °F) (Temporal)   Resp 16   Ht 1.6 m (5' 2.99\")   Wt 91.6 kg (202 lb)   SpO2 91%   BMI 35.79 kg/m²      Physical Exam  Constitutional:       Appearance: She is well-developed. She is obese.   HENT:      Head: Normocephalic and atraumatic.      Comments: Glasses to read only  Hard of hearing     Nose: Nose normal.      Mouth/Throat:      Mouth: Mucous " membranes are moist.   Eyes:      Extraocular Movements: Extraocular movements intact.   Neck:      Musculoskeletal: Normal range of motion.   Cardiovascular:      Rate and Rhythm: Normal rate and regular rhythm.   Pulmonary:      Effort: Pulmonary effort is normal.   Musculoskeletal: Normal range of motion.   Skin:     General: Skin is warm.      Coloration: Skin is pale.   Neurological:      Mental Status: She is alert and oriented to person, place, and time.      Motor: No abnormal muscle tone.      Gait: Gait abnormal (guarded ).      Comments: No observable changes in neurologic status.  See initial new patient examination for details.    Psychiatric:      Comments: Learning delay               Assessment/Plan:     History of Epilepsy:  Onset of seizures in her 40's, 35+ years ago.  Seizures concerning for partial seizure type.  Last concern for seizures was March 2017.  No other AED's tried and failed.     3/2018 EEG: Frequent right temporal sharps.     Learning delayed and naive:  Here with a male partner today.     Counseled at length regarding the phenytoin.  She does not wish to entertain a change in AED at this time.  Continue phenytoin ER 500mg qhs.  Needs to supplement with Calcium 2000mg and Vit D 2000IU per day.     Reviewed EEG results from the past.     Obtain DEXA scan this year-- order placed.     Obtain labs as ordered.    Return for follow-up in one year or sooner if needed.        EDUCATION AND COUNSELING:  -Education was provided to the patient and/or family regarding diagnosis and prognosis. The chronic and unpredictable nature of the condition was discussed. There is increased risk for additional events, which may carry potential for significant injuries and death.    -We reviewed the current antiepileptic regimen. Potential side effects of antiepileptics were discussed at length, including but no limited to: hypersensitivity reactions (rash and others, some of which can be fatal), visual  field changes (some of which may be irreversible), glaucoma, diplopia, kidney stones, osteopenia/osteoporosis/bone fractures, hyperthermia/anhydrosis, tremors, ataxia, dizziness, fatigue, increased risk for falls, cardiac arrhythmias/syncope, gastrointestinal (hepatitis, pancreatitis, gastritis, ulcers), gingival hypertrophy, drowsiness, sedation, anxiety/nervousness, increased risk for suicide, increased risk for depression, and psychosis. We reviewed drug-drug interactions and their potential effect on seizure control and medication side effects.    -Patient/family educated on SUDEP (Sudden Death in Epilepsy). Counseling was provided on the importance of strict medication and follow up compliance. The patient understands the risks associated with non-adherence with the medical plan as outlined, including but not limited to an increased risk for breakthrough seizures, which may contribute to injuries, disability, status epilepticus, and even death.    -Counseling was also provided on potential effects of alcohol and other drugs, which may lower seizure threshold and/or affect the metabolism of antiepileptic drugs. I recommend avoidance of alcohol and illegal drugs.  -Recommend proper hydration, regular exercise, proper sleep hygiene (7-8 hrs of overnight sleep, avoid sleep deprivation).    -I have made the patient aware of mandatory reporting required by the law in the State of [place] regarding episodes of seizures, loss of consciousness, and/or alteration of awareness. The patient and family are responsible for reporting events to the DMV, instructions were provided. The patient verbalized understanding and states she has not been driving. Other seizure precautions were discussed at length, including no diving, no skydiving, no unsupervised swimming, no Jacuzzi or bathing in bathtubs.          Pt agrees with plan.

## 2020-01-27 DIAGNOSIS — J30.2 SEASONAL ALLERGIES: ICD-10-CM

## 2020-01-27 RX ORDER — FLUTICASONE PROPIONATE 50 MCG
SPRAY, SUSPENSION (ML) NASAL
Qty: 16 G | Refills: 4 | Status: SHIPPED | OUTPATIENT
Start: 2020-01-27 | End: 2020-07-20

## 2020-01-27 NOTE — TELEPHONE ENCOUNTER
Was the patient seen in the last year in this department? Yes 3/28/19    Does patient have an active prescription for medications requested? No     Received Request Via: Pharmacy

## 2020-01-28 NOTE — TELEPHONE ENCOUNTER
Requested Prescriptions     Pending Prescriptions Disp Refills   • fluticasone (FLONASE) 50 MCG/ACT nasal spray [Pharmacy Med Name: FLUTICASONE PROPIONATE 50 SUSP] 16 g 4     Sig: SPRAY ONE SPRAY IN EACH NOSTRIL EVERY DAY       HUSSEIN Lopez.

## 2020-05-19 ENCOUNTER — HOSPITAL ENCOUNTER (OUTPATIENT)
Dept: LAB | Facility: MEDICAL CENTER | Age: 75
End: 2020-05-19
Attending: NURSE PRACTITIONER
Payer: MEDICARE

## 2020-05-19 DIAGNOSIS — G40.909 SEIZURE DISORDER (HCC): ICD-10-CM

## 2020-05-19 DIAGNOSIS — M85.80 OSTEOPENIA, UNSPECIFIED LOCATION: ICD-10-CM

## 2020-05-19 LAB
25(OH)D3 SERPL-MCNC: 59 NG/ML (ref 30–100)
ALBUMIN SERPL BCP-MCNC: 4.1 G/DL (ref 3.2–4.9)
ALBUMIN/GLOB SERPL: 1.6 G/DL
ALP SERPL-CCNC: 83 U/L (ref 30–99)
ALT SERPL-CCNC: 37 U/L (ref 2–50)
ANION GAP SERPL CALC-SCNC: 11 MMOL/L (ref 7–16)
AST SERPL-CCNC: 29 U/L (ref 12–45)
BASOPHILS # BLD AUTO: 1.1 % (ref 0–1.8)
BASOPHILS # BLD: 0.05 K/UL (ref 0–0.12)
BILIRUB SERPL-MCNC: 0.2 MG/DL (ref 0.1–1.5)
BUN SERPL-MCNC: 22 MG/DL (ref 8–22)
CALCIUM SERPL-MCNC: 9.5 MG/DL (ref 8.5–10.5)
CHLORIDE SERPL-SCNC: 105 MMOL/L (ref 96–112)
CO2 SERPL-SCNC: 26 MMOL/L (ref 20–33)
CREAT SERPL-MCNC: 0.9 MG/DL (ref 0.5–1.4)
EOSINOPHIL # BLD AUTO: 0.22 K/UL (ref 0–0.51)
EOSINOPHIL NFR BLD: 4.7 % (ref 0–6.9)
ERYTHROCYTE [DISTWIDTH] IN BLOOD BY AUTOMATED COUNT: 46.8 FL (ref 35.9–50)
FASTING STATUS PATIENT QL REPORTED: NORMAL
GLOBULIN SER CALC-MCNC: 2.6 G/DL (ref 1.9–3.5)
GLUCOSE SERPL-MCNC: 93 MG/DL (ref 65–99)
HCT VFR BLD AUTO: 42.6 % (ref 37–47)
HGB BLD-MCNC: 13.9 G/DL (ref 12–16)
IMM GRANULOCYTES # BLD AUTO: 0.01 K/UL (ref 0–0.11)
IMM GRANULOCYTES NFR BLD AUTO: 0.2 % (ref 0–0.9)
LYMPHOCYTES # BLD AUTO: 1.65 K/UL (ref 1–4.8)
LYMPHOCYTES NFR BLD: 34.9 % (ref 22–41)
MCH RBC QN AUTO: 33.3 PG (ref 27–33)
MCHC RBC AUTO-ENTMCNC: 32.6 G/DL (ref 33.6–35)
MCV RBC AUTO: 101.9 FL (ref 81.4–97.8)
MONOCYTES # BLD AUTO: 0.43 K/UL (ref 0–0.85)
MONOCYTES NFR BLD AUTO: 9.1 % (ref 0–13.4)
NEUTROPHILS # BLD AUTO: 2.37 K/UL (ref 2–7.15)
NEUTROPHILS NFR BLD: 50 % (ref 44–72)
NRBC # BLD AUTO: 0 K/UL
NRBC BLD-RTO: 0 /100 WBC
PHENYTOIN SERPL-MCNC: 10.9 UG/ML (ref 10–20)
PLATELET # BLD AUTO: 194 K/UL (ref 164–446)
PMV BLD AUTO: 10.5 FL (ref 9–12.9)
POTASSIUM SERPL-SCNC: 4.7 MMOL/L (ref 3.6–5.5)
PROT SERPL-MCNC: 6.7 G/DL (ref 6–8.2)
RBC # BLD AUTO: 4.18 M/UL (ref 4.2–5.4)
SODIUM SERPL-SCNC: 142 MMOL/L (ref 135–145)
WBC # BLD AUTO: 4.7 K/UL (ref 4.8–10.8)

## 2020-05-19 PROCEDURE — 85025 COMPLETE CBC W/AUTO DIFF WBC: CPT

## 2020-05-19 PROCEDURE — 80053 COMPREHEN METABOLIC PANEL: CPT

## 2020-05-19 PROCEDURE — 80185 ASSAY OF PHENYTOIN TOTAL: CPT

## 2020-05-19 PROCEDURE — 36415 COLL VENOUS BLD VENIPUNCTURE: CPT

## 2020-05-19 PROCEDURE — 82306 VITAMIN D 25 HYDROXY: CPT

## 2020-07-02 ENCOUNTER — HOSPITAL ENCOUNTER (OUTPATIENT)
Dept: RADIOLOGY | Facility: MEDICAL CENTER | Age: 75
End: 2020-07-02
Attending: PODIATRIST
Payer: MEDICARE

## 2020-07-02 DIAGNOSIS — M79.671 PAIN IN RIGHT FOOT: ICD-10-CM

## 2020-07-02 PROCEDURE — 73630 X-RAY EXAM OF FOOT: CPT | Mod: RT

## 2020-07-20 DIAGNOSIS — J30.2 SEASONAL ALLERGIES: ICD-10-CM

## 2020-07-20 RX ORDER — FLUTICASONE PROPIONATE 50 MCG
1 SPRAY, SUSPENSION (ML) NASAL DAILY
Qty: 16 G | Refills: 4 | Status: SHIPPED | OUTPATIENT
Start: 2020-07-20 | End: 2021-05-13

## 2020-07-20 NOTE — TELEPHONE ENCOUNTER
Requested Prescriptions     Pending Prescriptions Disp Refills   • fluticasone (FLONASE) 50 MCG/ACT nasal spray [Pharmacy Med Name: FLUTICASONE PROPIONATE 50 SUSP] 16 g 4     Sig: Spray 1 Spray in nose every day.       HUSSEIN Lopez.

## 2020-07-20 NOTE — TELEPHONE ENCOUNTER
Received request via: Pharmacy    Was the patient seen in the last year in this department? Yes 3/28/19    Does the patient have an active prescription (recently filled or refills available) for medication(s) requested? No

## 2020-07-24 ENCOUNTER — TELEPHONE (OUTPATIENT)
Dept: MEDICAL GROUP | Facility: PHYSICIAN GROUP | Age: 75
End: 2020-07-24

## 2020-07-24 NOTE — TELEPHONE ENCOUNTER
Future Appointments       Provider Department Center    7/27/2020 8:20 AM M[facility]      ANNUAL WELLNESS VISIT PRE-VISIT PLANNING WITHOUT OUTREACH    1.  Reviewed note from last office visit with PCP: YES  LOV  03/08/2019  2.  If any orders were placed at last visit, do we have Results/Consult Notes?        •  Labs - Labs were not ordered at last office visit.       •  Imaging - Imaging was not ordered at last office visit.       •  Referrals - No referrals were ordered at last office visit.    3.  Immunizations were updated in [facility] using WebIZ?: Yes       •  WebIZ Recommendations: FLU and TDAP        •  Is patient due for Tdap? YES. Patient was notified of copay/out of pocket cost.       •  Is patient due for Shingrix? NO     4.  Patient is due for the following Health Maintenance Topics:   Health Maintenance Due   Topic Date Due   • Annual Wellness Visit  1945   • IMM DTaP/Tdap/Td Vaccine (1 - Tdap) 08/14/1962     5.  Reviewed/Updated the following with patient:       •   Preferred Pharmacy? YES       •   Preferred Lab? YES       •   Preferred Communication? YES       •   Allergies? YES       •   Medications? YES. Was Abstract Encounter opened and chart updated? YES       •   Social History? YES. Was Abstract Encounter opened and chart updated? YES       •   Family History (document living status of immediate family members and if + hx of  cancer, diabetes, hypertension, hyperlipidemia, heart attack, stroke) YES. Was Abstract Encounter opened and chart updated? YES    6.  Care Team Updated:       •   DME Company (gait device, O2, CPAP, etc.): YES, she a walker uses sometimes        •   Other Specialists (eye doctor, derm, GYN, cardiology, endo, etc): YES,  Her last eye exam was last year in 2019    7. Orders for overdue Health Maintenance topics pended in Pre-Charting? NO    8.  Patient has the following Care Path diagnoses on Problem  List:  NONE    9.  Patient was advised: “This is a free wellness visit. The provider will screen for medical conditions to help you stay healthy. If you have other concerns to address you may be asked to discuss these at a separate visit or there may be an additional fee.”     10.  Patient was informed to arrive 15 min prior to their scheduled appointment and bring in their medication bottles.

## 2020-07-27 ENCOUNTER — OFFICE VISIT (OUTPATIENT)
Dept: MEDICAL GROUP | Facility: PHYSICIAN GROUP | Age: 75
End: 2020-07-27
Payer: MEDICARE

## 2020-07-27 VITALS
HEART RATE: 79 BPM | OXYGEN SATURATION: 93 % | DIASTOLIC BLOOD PRESSURE: 78 MMHG | RESPIRATION RATE: 16 BRPM | BODY MASS INDEX: 35.7 KG/M2 | SYSTOLIC BLOOD PRESSURE: 130 MMHG | HEIGHT: 62 IN | TEMPERATURE: 97 F | WEIGHT: 194 LBS

## 2020-07-27 DIAGNOSIS — Z78.0 POSTMENOPAUSAL: ICD-10-CM

## 2020-07-27 DIAGNOSIS — E55.9 VITAMIN D DEFICIENCY: ICD-10-CM

## 2020-07-27 DIAGNOSIS — Z91.81 RISK FOR FALLS: ICD-10-CM

## 2020-07-27 DIAGNOSIS — M85.88 OSTEOPENIA OF SPINE: ICD-10-CM

## 2020-07-27 DIAGNOSIS — N18.30 CKD (CHRONIC KIDNEY DISEASE) STAGE 3, GFR 30-59 ML/MIN: ICD-10-CM

## 2020-07-27 DIAGNOSIS — M79.671 CHRONIC FOOT PAIN, RIGHT: ICD-10-CM

## 2020-07-27 DIAGNOSIS — J30.2 SEASONAL ALLERGIES: ICD-10-CM

## 2020-07-27 DIAGNOSIS — G40.802 OTHER EPILEPSY WITHOUT STATUS EPILEPTICUS, NOT INTRACTABLE (HCC): ICD-10-CM

## 2020-07-27 DIAGNOSIS — G89.29 CHRONIC FOOT PAIN, RIGHT: ICD-10-CM

## 2020-07-27 DIAGNOSIS — Z23 NEED FOR VACCINATION: ICD-10-CM

## 2020-07-27 DIAGNOSIS — E78.5 DYSLIPIDEMIA: ICD-10-CM

## 2020-07-27 DIAGNOSIS — E53.8 VITAMIN B12 DEFICIENCY: ICD-10-CM

## 2020-07-27 DIAGNOSIS — E66.09 CLASS 2 OBESITY DUE TO EXCESS CALORIES WITHOUT SERIOUS COMORBIDITY WITH BODY MASS INDEX (BMI) OF 35.0 TO 35.9 IN ADULT: ICD-10-CM

## 2020-07-27 DIAGNOSIS — Z00.00 MEDICARE ANNUAL WELLNESS VISIT, INITIAL: ICD-10-CM

## 2020-07-27 PROCEDURE — G0439 PPPS, SUBSEQ VISIT: HCPCS | Performed by: NURSE PRACTITIONER

## 2020-07-27 RX ORDER — CLOSTRIDIUM TETANI TOXOID ANTIGEN (FORMALDEHYDE INACTIVATED), CORYNEBACTERIUM DIPHTHERIAE TOXOID ANTIGEN (FORMALDEHYDE INACTIVATED), BORDETELLA PERTUSSIS TOXOID ANTIGEN (GLUTARALDEHYDE INACTIVATED), BORDETELLA PERTUSSIS FILAMENTOUS HEMAGGLUTININ ANTIGEN (FORMALDEHYDE INACTIVATED), BORDETELLA PERTUSSIS PERTACTIN ANTIGEN, AND BORDETELLA PERTUSSIS FIMBRIAE 2/3 ANTIGEN 5; 2; 2.5; 5; 3; 5 [LF]/.5ML; [LF]/.5ML; UG/.5ML; UG/.5ML; UG/.5ML; UG/.5ML
0.5 INJECTION, SUSPENSION INTRAMUSCULAR ONCE
Qty: 0.5 ML | Refills: 0 | Status: SHIPPED | OUTPATIENT
Start: 2020-07-27 | End: 2020-07-27

## 2020-07-27 RX ORDER — NAPROXEN 500 MG/1
TABLET ORAL
COMMUNITY
Start: 2020-07-03 | End: 2022-03-04

## 2020-07-27 RX ORDER — CETIRIZINE HYDROCHLORIDE 10 MG/1
10 TABLET ORAL DAILY
Qty: 90 TAB | Refills: 0 | Status: SHIPPED
Start: 2020-07-27 | End: 2021-03-03

## 2020-07-27 ASSESSMENT — ENCOUNTER SYMPTOMS: GENERAL WELL-BEING: FAIR

## 2020-07-27 ASSESSMENT — ACTIVITIES OF DAILY LIVING (ADL): BATHING_REQUIRES_ASSISTANCE: 0

## 2020-07-27 ASSESSMENT — PATIENT HEALTH QUESTIONNAIRE - PHQ9: CLINICAL INTERPRETATION OF PHQ2 SCORE: 0

## 2020-07-27 ASSESSMENT — FIBROSIS 4 INDEX: FIB4 SCORE: 1.87

## 2020-07-27 NOTE — ASSESSMENT & PLAN NOTE
"Chronic, stable.  Not taking any cholesterol lowering medications.  Reports diet is \" improving, cutting down on portion size\".   She is not following a low-cholesterol diet.  She is not exercising regularly.  She is not taking ASA daily.  She denies dizziness, claudication, or chest pain.  Due for updated lab work.   6/19/2013 06:34 5/1/2014 06:32 11/3/2015 06:26   Cholesterol,Tot 197 186 188   Triglycerides 68 91 116   HDL 64 49 56    (H) 119 (H) 109 (H)     "

## 2020-07-27 NOTE — ASSESSMENT & PLAN NOTE
"Chronic, improving.   Weight change: 8 lbs since January 2020, 36 lbs since November 2019.  Diet: Has been working on decreasing portion size.  Exercise: Reports that she does sit down arm and leg exercises daily.  Reports that right foot pain interferes with more physical activity.  Vitals 11/20/2019 1/13/2020 7/27/2020   WEIGHT 230 202 194   HEIGHT 5' 3\" 5' 2.992\" 5' 2\"   BMI 40.74 kg/m2 35.79 kg/m2 35.48 kg/m2     "

## 2020-07-27 NOTE — ASSESSMENT & PLAN NOTE
Chronic, ongoing.  Continues to follow with neurology, KEREN Finney.  Continues Dilantin daily, reports epilepsy is well controlled.  Last seen by neurology in January 2020, due for follow-up in January 2021.

## 2020-07-27 NOTE — ASSESSMENT & PLAN NOTE
Chronic, stable.  Continues over-the-counter vitamin B12 supplement daily.  Due for updated labs.   10/2/2012 08:48 6/19/2013 06:34 5/1/2014 06:32 11/3/2015 06:26 6/18/2016 08:54   Vitamin B12 >1500 (H) 935 (H) 734 344 996 (H)

## 2020-07-27 NOTE — ASSESSMENT & PLAN NOTE
This is a new problem to the examiner.  Patient reports that she has had 3 falls recently.  Reports that she is occasionally getting dizzy and loses her balance.  Denies need for DME: Cane or walker.  States that she has both at home, but does not use them.

## 2020-07-27 NOTE — PROGRESS NOTES
"Chief Complaint   Patient presents with   • Annual Exam     HPI:  Nancie is a 74 y.o. here for Medicare Annual Wellness Visit    Seasonal allergies  Chronic, stable. Continues to use flonase nasal spray as needed. Reports some nasal congestion that is worse at night. Occasional shortness of breath with walking over the past few weeks.  Denies fevers, chills, headaches, cough.    Chronic foot pain, right  This is a new problem to the examiner. Continues to follow with podiatry, Dr. Fuentes, for this issue. Has had xrays that do not show bone spurs. May have to get an MRI for further workup, patient is undecided if she wants to complete MRI.    Risk for falls  This is a new problem to the examiner.  Patient reports that she has had 3 falls recently.  Reports that she is occasionally getting dizzy and loses her balance.  Denies need for DME: Cane or walker.  States that she has both at home, but does not use them.    Epilepsy  Chronic, ongoing.  Continues to follow with neurology, KEREN Finney.  Continues Dilantin daily, reports epilepsy is well controlled.  Last seen by neurology in January 2020, due for follow-up in January 2021.    Dyslipidemia  Chronic, stable.  Not taking any cholesterol lowering medications.  Reports diet is \" improving, cutting down on portion size\".   She is not following a low-cholesterol diet.  She is not exercising regularly.  She is not taking ASA daily.  She denies dizziness, claudication, or chest pain.  Due for updated lab work.   6/19/2013 06:34 5/1/2014 06:32 11/3/2015 06:26   Cholesterol,Tot 197 186 188   Triglycerides 68 91 116   HDL 64 49 56    (H) 119 (H) 109 (H)       Class 2 obesity due to excess calories with body mass index (BMI) of 35.0 to 35.9 in adult  Chronic, improving.   Weight change: 8 lbs since January 2020, 36 lbs since November 2019.  Diet: Has been working on decreasing portion size.  Exercise: Reports that she does sit down arm and leg exercises " "daily.  Reports that right foot pain interferes with more physical activity.  Vitals 11/20/2019 1/13/2020 7/27/2020   WEIGHT 230 202 194   HEIGHT 5' 3\" 5' 2.992\" 5' 2\"   BMI 40.74 kg/m2 35.79 kg/m2 35.48 kg/m2       CKD (chronic kidney disease) stage 3, GFR 30-59 ml/min  Chronic, improving.  Most recent labs completed in May 2020 within normal limits.  Continues to avoid NSAIDs and nephrotoxic medications.  Due to repeat labs in May 2021.   11/3/2015 06:26 6/18/2016 08:54 5/16/2017 06:30 5/19/2020 06:19   Bun 17 19 19 22   Creatinine 1.04 1.00 1.01 0.90   GFR  52 (A) 54 (A) 54 (A) >60       Vitamin D deficiency  Chronic, stable.  Continues over-the-counter vitamin D supplement daily.  Due for updated labs.   10/2/2012 08:48 5/1/2014 06:32 6/18/2016 08:54 5/16/2017 06:30 5/19/2020 06:19   25-Hydroxy   Vitamin D 25 38 25 (L) 31 33 59       Vitamin B12 deficiency  Chronic, stable.  Continues over-the-counter vitamin B12 supplement daily.  Due for updated labs.   10/2/2012 08:48 6/19/2013 06:34 5/1/2014 06:32 11/3/2015 06:26 6/18/2016 08:54   Vitamin B12 >1500 (H) 935 (H) 734 344 996 (H)       Osteopenia of spine  Chronic, ongoing.  Last DEXA: 4/4/2019  Next DEXA due: Due now  Reports she is currently supplementing vitamin D.  She reports she is not engaging in routine weightbearing exercise.  Reports recent falls, denies fractures.     Patient Active Problem List    Diagnosis Date Noted   • Risk for falls 07/27/2020   • Chronic foot pain, right 07/27/2020   • Changing nevus 03/28/2019   • Chronic bilateral low back pain without sciatica 03/28/2019   • Dyslipidemia 03/28/2019   • Class 2 obesity due to excess calories with body mass index (BMI) of 35.0 to 35.9 in adult 03/28/2019   • Chronic pain of both knees 10/10/2017   • CKD (chronic kidney disease) stage 3, GFR 30-59 ml/min ([facility]) 08/01/2016   • Osteopenia of spine 08/01/2016   • Vitamin B12 deficiency 08/01/2016   • Varicose veins of both lower extremities with " pain 08/01/2016   • Seasonal allergies 08/01/2016   • UI (urinary incontinence) 08/01/2016   • Vitamin D deficiency 08/01/2016   • Epilepsy (HCC) 08/01/2016     Current Outpatient Medications   Medication Sig Dispense Refill   • tetanus-dipth-acell pertussis (ADACEL) 5-2-15.5 LF-MCG/0.5 Suspension 0.5 mL by Intramuscular route Once for 1 dose. 0.5 mL 0   • cetirizine (ZYRTEC) 10 MG Tab Take 1 Tab by mouth every day. 90 Tab 0   • fluticasone (FLONASE) 50 MCG/ACT nasal spray Spray 1 Spray in nose every day. 16 g 4   • phenytoin ER (DILANTIN) 100 MG Cap TAKE 5 CAPSULES Nightly 450 Cap 1   • Calcium-Magnesium-Vitamin D (CALCIUM 500 PO) Take  by mouth.     • Multiple Vitamin (MULTI-VITAMINS PO) Take  by mouth.     • Cholecalciferol (VITAMIN D PO) Take  by mouth.     • Cyanocobalamin (VITAMIN B12 PO) Take  by mouth.     • naproxen (NAPROSYN) 500 MG Tab        No current facility-administered medications for this visit.       Patient is taking medications as noted in medication list.  Current supplements as per medication list.     Allergies: Patient has no known allergies.    Current social contact/activities: Family dinners     Is patient current with immunizations? No, due for TDAP. Patient is interested in receiving TDAP prescription today.    She  reports that she quit smoking about 41 years ago. Her smoking use included cigarettes. She started smoking about 43 years ago. She has a 1.00 pack-year smoking history. She has never used smokeless tobacco. She reports that she does not drink alcohol or use drugs.  Counseling given: Yes    DPA/Advanced directive: Patient has Living Will, but it is not on file. Instructed to bring in a copy to scan into their chart.    ROS:    Gait: Uses a walker, cane  Ostomy: No   Other tubes: No   Amputations: No   Chronic oxygen use No   Last eye exam 2019   Wears hearing aids: No   : Reports urinary leakage during the last 6 months that has somewhat interfered with their daily  activities or sleep.    Screening:    Depression Screening    Little interest or pleasure in doing things?  0 - not at all  Feeling down, depressed, or hopeless? 0 - not at all  Patient Health Questionnaire Score: 0    If depressive symptoms identified deferred to follow up visit unless specifically addressed in assessment and plan.    Interpretation of PHQ-9 Total Score   Score Severity   1-4 No Depression   5-9 Mild Depression   10-14 Moderate Depression   15-19 Moderately Severe Depression   20-27 Severe Depression    Screening for Cognitive Impairment    Three Minute Recall (river, vandana, finger)  1/3 1/3  [place] clock face with all 12 numbers and set the hands to show 10 past 11.  Yes 3/5  If cognitive concerns identified, deferred for follow up unless specifically addressed in assessment and plan.    Fall Risk Assessment    Has the patient had two or more falls in the last year or any fall with injury in the last year?  Yes  If fall risk identified, deferred for follow up unless specifically addressed in assessment and plan.    Safety Assessment    Throw rugs on floor.  Yes  Handrails on all stairs.  Yes  Good lighting in all hallways.  Yes  Difficulty hearing.  No  Patient counseled about all safety risks that were identified.    Functional Assessment ADLs    Are there any barriers preventing you from cooking for yourself or meeting nutritional needs?  No.    Are there any barriers preventing you from driving safely or obtaining transportation?  No.    Are there any barriers preventing you from using a telephone or calling for help?  No.    Are there any barriers preventing you from shopping?  No.    Are there any barriers preventing you from taking care of your own finances?  No.    Are there any barriers preventing you from managing your medications?  No.    Are there any barriers preventing you from showering, bathing or dressing yourself?  No.    Are you currently engaging in any exercise or physical  activity?  Yes.     What is your perception of your health?  Fair.    Health Maintenance Summary                IMM DTaP/Tdap/Td Vaccine Overdue 1962     IMM INFLUENZA Next Due 2020      Done 10/3/2019 Imm Admin: Influenza Vaccine Adult HD     Patient has more history with this topic...    MAMMOGRAM Next Due 2020      Done 2019 MA-SCREENING MAMMO BILAT W/TOMOSYNTHESIS W/CAD     Patient has more history with this topic...    BONE DENSITY Next Due 2021      Done 2019 DS-BONE DENSITY STUDY (DEXA)     Patient has more history with this topic...    Annual Wellness Visit Next Due 2021      Done 2020 INITIAL ANNUAL WELLNESS VISIT-INCLUDES PPPS ()     Patient has more history with this topic...    COLONOSCOPY Next Due 2024      Done 2014 REFERRAL TO GI FOR COLONOSCOPY        Patient Care Team:  MOUNA Lopez as PCP - General (Family Medicine)  HOUSTON EsparzaPBRYCE (Podiatry)  LEO Elise (Neurology)    Social History     Tobacco Use   • Smoking status: Former Smoker     Packs/day: 0.50     Years: 2.00     Pack years: 1.00     Types: Cigarettes     Start date: 1977     Last attempt to quit: 3/28/1979     Years since quittin.3   • Smokeless tobacco: Never Used   Substance Use Topics   • Alcohol use: No     Alcohol/week: 0.0 oz   • Drug use: No     Family History   Problem Relation Age of Onset   • Cancer Sister         sinus   • Alzheimer's Disease Sister    • Lung Cancer Father    • Cancer Mother         STOMACH   • Heart Disease Sister    • Cancer Sister    • Heart Disease Sister    • Cancer Brother         pancreatic cancer   • Seizures Paternal Aunt    • No Known Problems Maternal Grandmother    • No Known Problems Maternal Grandfather    • No Known Problems Paternal Grandmother    • No Known Problems Paternal Grandfather    • Breast Cancer Sister      She  has a past medical history of Basal cell carcinoma (2015), CKD (chronic  "kidney disease) stage 3, GFR 30-59 ml/min (HCC) (8/1/2016), Epilepsy (HCC) (8/1/2016), Osteopenia (8/1/2016), Seasonal allergies (8/1/2016), Seizure disorder (HCC), UI (urinary incontinence) (8/1/2016), Varicose veins (8/1/2016), Vitamin B12 deficiency (8/1/2016), and Vitamin D deficiency (8/1/2016).   Past Surgical History:   Procedure Laterality Date   • CHOLECYSTECTOMY  1989   • TUBAL COAGULATION LAPAROSCOPIC BILATERAL  1979   • ABDOMINAL HYSTERECTOMY TOTAL     • VEIN STRIPPING       Exam:     /78 (BP Location: Left arm, Patient Position: Sitting, BP Cuff Size: Adult)   Pulse 79   Temp 36.1 °C (97 °F) (Temporal)   Resp 16   Ht 1.575 m (5' 2\")   Wt 88 kg (194 lb)   SpO2 93%  Body mass index is 35.48 kg/m².    Hearing good.    Dentition fair  Alert, oriented in no acute distress.  Eye contact is good, speech goal directed, affect calm    Assessment and Plan. The following treatment and monitoring plan is recommended:    1. Medicare annual wellness visit, initial  - Initial Annual Wellness Visit - Includes PPPS ()    2. Other epilepsy without status epilepticus, not intractable (HCC)  Chronic, ongoing and stable.  Continue following with neurology, due for follow-up in January 2021.  Continue medications as prescribed by neurology.    3. CKD (chronic kidney disease) stage 3, GFR 30-59 ml/min (HCC)  Chronic, stable.  Continue to avoid NSAIDs and nephrotoxic medications.  Due for updated annual labs in May 2021.  - CBC WITH DIFFERENTIAL; Future  - Comp Metabolic Panel; Future    4. Class 2 obesity due to excess calories without serious comorbidity with body mass index (BMI) of 35.0 to 35.9 in adult  Chronic, improving.  Continue to work on healthy diet with decreased portion sizes, increase activity as tolerated, and weight loss.  Due for updated annual labs in May 2021.  - Patient identified as having weight management issue.  Appropriate orders and counseling given.  - Comp Metabolic Panel; " Future  - TSH WITH REFLEX TO FT4; Future    5. Dyslipidemia  Chronic, stable.  Not a medication for this issue.  Continue to work on healthy diet with decreased portion sizes, increase activity as tolerated, and weight loss.  Due for updated annual labs in May 2021.  - Comp Metabolic Panel; Future  - Lipid Profile; Future    6. Vitamin D deficiency  Chronic, stable.  Continue over-the-counter vitamin D supplement daily.  Due for updated annual labs in May 2021.  - VITAMIN D,25 HYDROXY; Future    7. Vitamin B12 deficiency  Chronic, stable.  Continue over-the-counter vitamin B12 supplement daily.  Due for updated annual labs in May 2021.  - VITAMIN B12; Future    8. Seasonal allergies  Chronic, ongoing.  Continue Flonase nasal spray daily.  Plan to start cetirizine 10 mg 1 time daily.  Encouraged nasal saline spray and humidifier in room at night.  Return to be seen if symptoms do not improve or worsen.  - cetirizine (ZYRTEC) 10 MG Tab; Take 1 Tab by mouth every day.  Dispense: 90 Tab; Refill: 0    9. Chronic foot pain, right  Chronic, ongoing.  Continue to follow-up with podiatry.  Patient is considering scheduling MRI of right foot.    10. Risk for falls  Chronic, ongoing.  Encouraged patient to use her walker and cane that she has at home available to her.  Discussed referral to physical therapy for gait s strengthening/training, patient is interested in referral.  - Patient identified as fall risk.  Appropriate orders and counseling given.  - REFERRAL TO PHYSICAL THERAPY Reason for Therapy: Eval/Treat/Report    11. Osteopenia of spine  12. Postmenopausal  Chronic, ongoing.  Overdue for bone density scan, ordered today.  Continue vitamin D supplement and exercise as tolerated.  - DS-BONE DENSITY STUDY (DEXA); Future    13. Need for vaccination  Printed prescription provided to patient to take to pharmacy.  - tetanus-dipth-acell pertussis (ADACEL) 5-2-15.5 LF-MCG/0.5 Suspension; 0.5 mL by Intramuscular route Once  for 1 dose.  Dispense: 0.5 mL; Refill: 0     Services suggested: No services needed at this time  Health Care Screening recommendations as per orders if indicated.  Referrals offered: PT/OT/Nutrition counseling/Behavioral Health/Smoking cessation as per orders if indicated.    Discussion today about general wellness and lifestyle habits:    · Prevent falls and reduce trip hazards; Cautioned about securing or removing rugs.  · Have a working fire alarm and carbon monoxide detector;   · Engage in regular physical activity and social activities     Follow-up: Return in about 10 months (around 6/1/2021) for Follow up Labs, As needed. One year for HC/PCP AWV    Please note that this dictation was created using voice recognition software. I have worked with consultants from the vendor as well as technical experts from [facility] [employer] to optimize the interface. I have made every reasonable attempt to correct obvious errors, but I expect that there are errors of grammar and possibly content that I did not discover before finalizing the note.

## 2020-07-27 NOTE — ASSESSMENT & PLAN NOTE
Chronic, improving.  Most recent labs completed in May 2020 within normal limits.  Continues to avoid NSAIDs and nephrotoxic medications.  Due to repeat labs in May 2021.   11/3/2015 06:26 6/18/2016 08:54 5/16/2017 06:30 5/19/2020 06:19   Bun 17 19 19 22   Creatinine 1.04 1.00 1.01 0.90   GFR  52 (A) 54 (A) 54 (A) >60

## 2020-07-27 NOTE — ASSESSMENT & PLAN NOTE
Chronic, ongoing.  Last DEXA: 4/4/2019  Next DEXA due: Due now  Reports she is currently supplementing vitamin D.  She reports she is not engaging in routine weightbearing exercise.  Reports recent falls, denies fractures.

## 2020-07-27 NOTE — ASSESSMENT & PLAN NOTE
Chronic, stable. Continues to use flonase nasal spray as needed. Reports some nasal congestion that is worse at night. Occasional shortness of breath with walking over the past few weeks.  Denies fevers, chills, headaches, cough.

## 2020-07-27 NOTE — ASSESSMENT & PLAN NOTE
This is a new problem to the examiner. Continues to follow with podiatry, Dr. Fuentes, for this issue. Has had xrays that do not show bone spurs. May have to get an MRI for further workup, patient is undecided if she wants to complete MRI.

## 2020-07-27 NOTE — ASSESSMENT & PLAN NOTE
Chronic, stable.  Continues over-the-counter vitamin D supplement daily.  Due for updated labs.   10/2/2012 08:48 5/1/2014 06:32 6/18/2016 08:54 5/16/2017 06:30 5/19/2020 06:19   25-Hydroxy   Vitamin D 25 38 25 (L) 31 33 59

## 2020-08-06 ENCOUNTER — HOSPITAL ENCOUNTER (OUTPATIENT)
Dept: RADIOLOGY | Facility: MEDICAL CENTER | Age: 75
End: 2020-08-06
Attending: NURSE PRACTITIONER
Payer: MEDICARE

## 2020-08-06 DIAGNOSIS — M85.88 OSTEOPENIA OF SPINE: ICD-10-CM

## 2020-08-06 DIAGNOSIS — Z78.0 POSTMENOPAUSAL: ICD-10-CM

## 2020-08-06 PROCEDURE — 77080 DXA BONE DENSITY AXIAL: CPT

## 2020-09-01 ENCOUNTER — PHYSICAL THERAPY (OUTPATIENT)
Dept: PHYSICAL THERAPY | Facility: REHABILITATION | Age: 75
End: 2020-09-01
Attending: NURSE PRACTITIONER
Payer: MEDICARE

## 2020-09-01 DIAGNOSIS — Z91.81 RISK FOR FALLS: ICD-10-CM

## 2020-09-01 PROCEDURE — 97110 THERAPEUTIC EXERCISES: CPT

## 2020-09-01 PROCEDURE — 97161 PT EVAL LOW COMPLEX 20 MIN: CPT

## 2020-09-01 SDOH — ECONOMIC STABILITY: GENERAL: QUALITY OF LIFE: FAIR

## 2020-09-01 ASSESSMENT — ENCOUNTER SYMPTOMS
PAIN SCALE: 0
PAIN SCALE AT HIGHEST: 0
PAIN SCALE AT LOWEST: 0

## 2020-09-01 NOTE — OP THERAPY EVALUATION
Outpatient Physical Therapy  INITIAL EVALUATION    [facility] [place]  [address]  [place] [address]  Phone:  [phone]  Fax:  [phone]    Date of Evaluation: 2020    Patient: Nancie Joyner  YOB: 1945  MRN: [id number]     Referring Provider: MOUNA Lopez  [address] [place] [address]   Referring Diagnosis Risk for falls [Z91.81]     Time Calculation    Start time: 1330  Stop time: 1430 Time Calculation (min): 60 minutes         Chief Complaint: Difficulty Walking    Visit Diagnoses     ICD-10-CM   1. Risk for falls  Z91.81         Subjective:   History of Present Illness:     Date of onset:  2020  Quality of life:  Fair  Prior level of function:  Risk for falls  Pain:     Current pain ratin    At best pain ratin    At worst pain ratin  Activities of Daily Living:     Patient reported ADL status: Falls  Decreased LE strength  Poor transfers  Gait deviations   Patient Goals:     Patient goals for therapy:  Increased motion, improved balance and increased strength    Patient is a 75 y.o. female that presents to therapy with multiple falls. States that symptoms were insidious in onset. Reports that she just feels her balance go and she falls.. Reports that symptoms now worsening. States increased pain in the back and knees. States that movement is difficult for her. Notes intermittent numbness and tingling in the feet.   Past Medical History:   Diagnosis Date   • Basal cell carcinoma 2015    left eyelid   • CKD (chronic kidney disease) stage 3, GFR 30-59 ml/min ([facility]) 2016   • Epilepsy ([facility]) 2016   • Osteopenia 2016   • Seasonal allergies 2016   • Seizure disorder ([facility])    • UI (urinary incontinence) 2016   • Varicose veins 2016   • Vitamin B12 deficiency 2016   • Vitamin D deficiency 2016     Past Surgical History:   Procedure Laterality Date   • CHOLECYSTECTOMY     •  TUBAL COAGULATION LAPAROSCOPIC BILATERAL     • ABDOMINAL HYSTERECTOMY TOTAL     • VEIN STRIPPING       Social History     Tobacco Use   • Smoking status: Former Smoker     Packs/day: 0.50     Years: 2.00     Pack years: 1.00     Types: Cigarettes     Start date: 1977     Quit date: 3/28/1979     Years since quittin.4   • Smokeless tobacco: Never Used   Substance Use Topics   • Alcohol use: No     Alcohol/week: 0.0 oz     Family and Occupational History     Socioeconomic History   • Marital status:      Spouse name: Cristhian   • Number of children: Not on file   • Years of education: Not on file   • Highest education level: Not on file   Occupational History   • Not on file       Objective     Postural Observations  Seated posture: poor  Standing posture: poor        Neurological Testing     Reflexes   Left   Patellar (L4): normal (2+)  Achilles (S1): trace (1+)  Ankle clonus reflex: negative  Babinski sign: negative    Right   Patellar (L4): normal (2+)  Achilles (S1): trace (1+)  Ankle clonus reflex: negative  Babinski sign: negative    Myotome testing   Lumbar (left)   L1 (hip flexors): 3+ (P)  L2 (hip flexors): 3+  L3 (knee extensors): 4+  L4 (ankle dorsiflexors): 4+  L5 (great toe extension): 4  S1 (ankle plantar flexors): 4    Lumbar (right)   L1 (hip flexors): 3+  L2 (hip flexors): 3+  L3 (knee extensors): 4+  L4 (ankle dorsiflexors): 4+  L5 (great toe extension): 4-  S1 (ankle plantar flexors): 4    Additional Neurological Details  WFL B  Decreased proprioception to small movements at ankle    Active Range of Motion     Lumbar   Flexion: decreased  Extension: decreased  Left lateral flexion: decreased  Right lateral flexion: decreased    Strength:      Left Hip   Planes of Motion   Abduction: 3+  Adduction: 4+    Right Hip   Planes of Motion   Abduction: 3+  Adduction: 4+    Tests     Left Hip   SLR: Positive.     Right Hip   SLR: Negative.     Additional Tests Details  (+) B hamstring  tightness        Therapeutic Exercises (CPT 49461):     1. Seated glut sets, x20    2. Seated hip abd, x20    3. SAQ, x20    4. Edu on Walker use, x5min      Time-based treatments/modalities:    Physical Therapy Timed Treatment Charges  Therapeutic exercise minutes (CPT 72556): 10 minutes      Assessment, Response and Plan:   Impairments: abnormal or restricted ROM, activity intolerance, impaired physical strength and pain with function    Assessment details:  Patient presents with signs and symptoms consistent with a balance deficit. Patient limitations include weakness, decreased ROM, and pain. Patient demonstrated decreased proprioception and required hands with tranfers. Patient will benefit from skilled therapy to improve the aforementioned deficits and decrease further functional decline.   Prognosis: fair    Goals:   Short Term Goals:   1) Patient's hip abd strength will improve by a half muscle grade to facilitate improved standing tolerance.  2) Patient's syptoms will improve to facilitate decreased sway with standing.  Short term goal time span:  2-4 weeks      Long Term Goals:    1) Patient's TUG score will improve by 4 sec to demonstrate an improvement in balance.   2) Patient's BBS will improve by 10 to demonstrate functional improvement  Long term goal time span:  6-8 weeks    Plan:   Therapy options:  Physical therapy treatment to continue  Planned therapy interventions:  E Stim Unattended (CPT 88449), Manual Therapy (CPT 38779), Neuromuscular Re-education (CPT 93288), Therapeutic Exercise (CPT 78575), Hot or Cold Pack Therapy (CPT 59231) and Gait Training (CPT 18670)  Frequency:  2x week  Duration in weeks:  8  Discussed with:  Patient  Plan details:  Edu re: use of FWW      Functional Assessment Used  PT Functional Assessment Tool Used: TUG/BBS  PT Functional Assessment Score: 20.2sec/35sec     Referring provider co-signature:  I have reviewed this plan of care and my co-signature certifies the need  for services.    Certification Period: 09/01/2020 to  10/27/20    Physician Signature: ________________________________ Date: ______________

## 2020-09-03 ENCOUNTER — PHYSICAL THERAPY (OUTPATIENT)
Dept: PHYSICAL THERAPY | Facility: REHABILITATION | Age: 75
End: 2020-09-03
Attending: NURSE PRACTITIONER
Payer: MEDICARE

## 2020-09-03 DIAGNOSIS — Z91.81 RISK FOR FALLS: ICD-10-CM

## 2020-09-03 PROCEDURE — 97110 THERAPEUTIC EXERCISES: CPT

## 2020-09-03 NOTE — OP THERAPY DAILY TREATMENT
Outpatient Physical Therapy  DAILY TREATMENT     [facility] [place]  [address]  [facility] [address]  Phone:  [phone]  Fax:  [phone]    Date: 09/03/2020    Patient: Nancie Joyner  YOB: 1945  MRN: [id number]     Time Calculation    Start time: 1330  Stop time: 1400 Time Calculation (min): 30 minutes         Chief Complaint: Fall    Visit #: 2    SUBJECTIVE:  Patient reports that she has not fallen.     OBJECTIVE:  Current objective measures:           Therapeutic Exercises (CPT 10394):     1. Seated glut sets, x20    2. Seated hip abd, x20    3. SAQ, x20    4. Edu on Walker use, x5min    5. Nu step, x8min L1    6. Sit to stand , x5    7. Standing in // bars eyes closed, x10 attempts    8. Walking training, x10min      Therapeutic Exercise Summary:           Time-based treatments/modalities:    Physical Therapy Timed Treatment Charges  Therapeutic exercise minutes (CPT 22920): 30 minutes      Pain rating (1-10) before treatment:  2  Pain rating (1-10) after treatment:  1    ASSESSMENT:   Response to treatment: Patient demonstrated correct FWW use and was able to tolerate increased activity.     PLAN/RECOMMENDATIONS:   Plan for treatment: therapy treatment to continue next visit.  Planned interventions for next visit: continue with current treatment.

## 2020-09-09 ENCOUNTER — PHYSICAL THERAPY (OUTPATIENT)
Dept: PHYSICAL THERAPY | Facility: REHABILITATION | Age: 75
End: 2020-09-09
Attending: NURSE PRACTITIONER
Payer: MEDICARE

## 2020-09-09 DIAGNOSIS — Z91.81 RISK FOR FALLS: ICD-10-CM

## 2020-09-09 PROCEDURE — 97110 THERAPEUTIC EXERCISES: CPT

## 2020-09-09 PROCEDURE — 97112 NEUROMUSCULAR REEDUCATION: CPT

## 2020-09-09 NOTE — OP THERAPY DAILY TREATMENT
Outpatient Physical Therapy  DAILY TREATMENT     [facility] [place]  [address], [address]  [facility] [address]  Phone:  [phone]  Fax:  [phone]    Date: 09/09/2020    Patient: Nancie Joyner  YOB: 1945  MRN: [id number]     Time Calculation    Start time: 1400  Stop time: 1430 Time Calculation (min): 30 minutes         Chief Complaint: Difficulty Walking    Visit #: 3    SUBJECTIVE:  Patient reports that she has had not falls and states that overall that her back pain has been better after her injections.     OBJECTIVE:  Current objective measures:           Therapeutic Exercises (CPT 81794):     1. Nu step, x8min    2. Sit to stand with eyes closed, x10    Therapeutic Treatments and Modalities:     1. Neuromuscular Re-education (CPT 27898), Training with spouse in corner with chair infront: corner balance training NBOS/WBOS eyes closed; progression to mod tandem to tandem; difficulty of eyes open, head turns, eyes closed    Time-based treatments/modalities:    Physical Therapy Timed Treatment Charges  Neuromusc re-ed, balance, coor, post minutes (CPT 49346): 20 minutes  Therapeutic exercise minutes (CPT 23327): 10 minutes      Pain rating (1-10) before treatment:  0  Pain rating (1-10) after treatment:  0    ASSESSMENT:   Response to treatment: Patient and spouse demonstrated understanding of balance exercise. No increase in pain was noted. Poor proprioception with eyes closed.     PLAN/RECOMMENDATIONS:   Plan for treatment: therapy treatment to continue next visit.  Planned interventions for next visit: continue with current treatment.

## 2020-09-11 ENCOUNTER — PHYSICAL THERAPY (OUTPATIENT)
Dept: PHYSICAL THERAPY | Facility: REHABILITATION | Age: 75
End: 2020-09-11
Attending: NURSE PRACTITIONER
Payer: MEDICARE

## 2020-09-11 DIAGNOSIS — Z91.81 RISK FOR FALLS: ICD-10-CM

## 2020-09-11 PROCEDURE — 97110 THERAPEUTIC EXERCISES: CPT

## 2020-09-11 PROCEDURE — 97112 NEUROMUSCULAR REEDUCATION: CPT

## 2020-09-11 NOTE — OP THERAPY DAILY TREATMENT
Outpatient Physical Therapy  DAILY TREATMENT     [facility] [place]  [address]  [facility] [address]  Phone:  [phone]  Fax:  [phone]    Date: 09/11/2020    Patient: Nancie Joyner  YOB: 1945  MRN: [id number]     Time Calculation    Start time: 1330  Stop time: 1400 Time Calculation (min): 30 minutes         Chief Complaint: Difficulty Walking    Visit #: 4    SUBJECTIVE:  Patient reports no falls. States her back is still feeling good.     OBJECTIVE:  Current objective measures:           Therapeutic Exercises (CPT 18738):     1. Nu step, x8min    2. Sit to stand with eyes closed, x10    Therapeutic Treatments and Modalities:     1. Neuromuscular Re-education (CPT 85161), //bar standing of foam eyes open/closed; standing on 1/2 foam roll/ gait with SPC    Time-based treatments/modalities:    Physical Therapy Timed Treatment Charges  Neuromusc re-ed, balance, coor, post minutes (CPT 83293): 15 minutes  Therapeutic exercise minutes (CPT 85963): 15 minutes      Pain rating (1-10) before treatment:  1  Pain rating (1-10) after treatment:  1    ASSESSMENT:   Response to treatment: Patient responded well to therapy with only an increase in fatigue. Notes no falls.     PLAN/RECOMMENDATIONS:   Plan for treatment: therapy treatment to continue next visit.  Planned interventions for next visit: continue with current treatment.

## 2020-09-15 ENCOUNTER — PHYSICAL THERAPY (OUTPATIENT)
Dept: PHYSICAL THERAPY | Facility: REHABILITATION | Age: 75
End: 2020-09-15
Attending: NURSE PRACTITIONER
Payer: MEDICARE

## 2020-09-15 DIAGNOSIS — Z91.81 RISK FOR FALLS: ICD-10-CM

## 2020-09-15 PROCEDURE — 97112 NEUROMUSCULAR REEDUCATION: CPT

## 2020-09-15 PROCEDURE — 97110 THERAPEUTIC EXERCISES: CPT

## 2020-09-15 NOTE — OP THERAPY DAILY TREATMENT
Outpatient Physical Therapy  DAILY TREATMENT     [facility] [place]  [address]  [facility] [address]  Phone:  [phone]  Fax:  [phone]    Date: 09/15/2020    Patient: Nancie Joyner  YOB: 1945  MRN: [id number]     Time Calculation    Start time: 1400  Stop time: 1430 Time Calculation (min): 30 minutes         Chief Complaint: Difficulty Walking    Visit #: 5    SUBJECTIVE:  Patient reports no falls and continues to note that her LB is feeling good.       OBJECTIVE:  Current objective measures:   Now able to hold 10sec NBOS on foam          Therapeutic Exercises (CPT 39340):     1. Nu step, x10min    2. Trial supine bridge, x10    3. Sit to stand, x5    4. Seated hip abd, x20    5. Clams, x20    Therapeutic Treatments and Modalities:     1. Neuromuscular Re-education (CPT 79534), //bar standing of foam eyes open/closed; standing on 1/2 foam roll/ gait with SPC    Time-based treatments/modalities:    Physical Therapy Timed Treatment Charges  Neuromusc re-ed, balance, coor, post minutes (CPT 57386): 10 minutes  Therapeutic exercise minutes (CPT 48513): 20 minutes      Pain rating (1-10) before treatment:  0  Pain rating (1-10) after treatment:  0    ASSESSMENT:   Response to treatment: Patient is progressing well with therapy. Plan to trial a SPC at next visit. No falls.     PLAN/RECOMMENDATIONS:   Plan for treatment: therapy treatment to continue next visit.  Planned interventions for next visit: continue with current treatment.

## 2020-09-17 ENCOUNTER — PHYSICAL THERAPY (OUTPATIENT)
Dept: PHYSICAL THERAPY | Facility: REHABILITATION | Age: 75
End: 2020-09-17
Attending: NURSE PRACTITIONER
Payer: MEDICARE

## 2020-09-17 DIAGNOSIS — Z91.81 RISK FOR FALLS: ICD-10-CM

## 2020-09-17 PROCEDURE — 97112 NEUROMUSCULAR REEDUCATION: CPT

## 2020-09-17 PROCEDURE — 97110 THERAPEUTIC EXERCISES: CPT

## 2020-09-17 NOTE — OP THERAPY DAILY TREATMENT
Outpatient Physical Therapy  DAILY TREATMENT     [facility] [place]  [address]  [facility] [address]  Phone:  [phone]  Fax:  [phone]    Date: 09/17/2020    Patient: Nancie Joyner  YOB: 1945  MRN: [id number]     Time Calculation    Start time: 1330  Stop time: 1400 Time Calculation (min): 30 minutes         Chief Complaint: Difficulty Walking    Visit #: 6    SUBJECTIVE:  Patient reports no falls or pain. States taht she has been doing well.     OBJECTIVE:  Current objective measures:   Required few cues for 3 point / 2 point pattern          Therapeutic Exercises (CPT 83599):     1. Nu step, x10min    2. Trial supine bridge, x10    3. Sit to stand, x5    4. Seated hip abd, x20    5. Clams, x20    Therapeutic Treatments and Modalities:     1. Neuromuscular Re-education (CPT 57577), //bar standing of foam eyes open/closed; standing on 1/2 foam roll/ gait with SPC    Time-based treatments/modalities:    Physical Therapy Timed Treatment Charges  Neuromusc re-ed, balance, coor, post minutes (CPT 10565): 10 minutes  Therapeutic exercise minutes (CPT 32580): 20 minutes      Pain rating (1-10) before treatment:  0  Pain rating (1-10) after treatment:  0    ASSESSMENT:   Response to treatment: Patient demonstrated an in session improvement with cues for a 3 point gait pattern. Patient will continue to need practice for a 3 point pattern.     PLAN/RECOMMENDATIONS:   Plan for treatment: therapy treatment to continue next visit.  Planned interventions for next visit: continue with current treatment.

## 2020-09-29 ENCOUNTER — PHYSICAL THERAPY (OUTPATIENT)
Dept: PHYSICAL THERAPY | Facility: REHABILITATION | Age: 75
End: 2020-09-29
Attending: NURSE PRACTITIONER
Payer: MEDICARE

## 2020-09-29 DIAGNOSIS — Z91.81 RISK FOR FALLS: ICD-10-CM

## 2020-09-29 PROCEDURE — 97110 THERAPEUTIC EXERCISES: CPT

## 2020-09-29 PROCEDURE — 97112 NEUROMUSCULAR REEDUCATION: CPT

## 2020-09-29 NOTE — OP THERAPY DAILY TREATMENT
Outpatient Physical Therapy  DAILY TREATMENT     [facility] [place]  [address]  [facility] [address]  Phone:  [phone]  Fax:  [phone]    Date: 09/29/2020    Patient: Nancie Joyner  YOB: 1945  MRN: [id number]     Time Calculation    Start time: 1330  Stop time: 1410 Time Calculation (min): 40 minutes         Chief Complaint: Difficulty Walking    Visit #: 7    SUBJECTIVE:  Patient reports that symptoms have been stable. Notes no falls now with a SPC for about 2 weeks.     OBJECTIVE:  Current objective measures:   Unable to  tandem          Therapeutic Exercises (CPT 48655):     1. Nu step, x10min    2. Trial supine bridge, x10    3. Sit to stand, x5    4. Seated hip abd, x20    5. Clams, x20    Therapeutic Treatments and Modalities:     1. Neuromuscular Re-education (CPT 97182), //bar standing of foam eyes open/closed; standing on 1/2 foam roll/ gait with SPC; stair training on 2in step    Time-based treatments/modalities:    Physical Therapy Timed Treatment Charges  Neuromusc re-ed, balance, coor, post minutes (CPT 45902): 30 minutes  Therapeutic exercise minutes (CPT 75274): 10 minutes      Pain rating (1-10) before treatment:  0  Pain rating (1-10) after treatment:  0    ASSESSMENT:   Response to treatment: Patient responded well to therapy with an overall improvement in balance. Patient still demonstrates difficulty with dynamic balance. Continue with balance training.     PLAN/RECOMMENDATIONS:   Plan for treatment: therapy treatment to continue next visit.  Planned interventions for next visit: continue with current treatment.

## 2020-10-01 ENCOUNTER — PHYSICAL THERAPY (OUTPATIENT)
Dept: PHYSICAL THERAPY | Facility: REHABILITATION | Age: 75
End: 2020-10-01
Attending: NURSE PRACTITIONER
Payer: MEDICARE

## 2020-10-01 DIAGNOSIS — Z91.81 RISK FOR FALLS: ICD-10-CM

## 2020-10-01 PROCEDURE — 97110 THERAPEUTIC EXERCISES: CPT

## 2020-10-01 PROCEDURE — 97112 NEUROMUSCULAR REEDUCATION: CPT

## 2020-10-01 NOTE — OP THERAPY DAILY TREATMENT
Outpatient Physical Therapy  DAILY TREATMENT     [facility] [place]  [address]  [facility] [address]  Phone:  [phone]  Fax:  [phone]    Date: 10/01/2020    Patient: Nancie Joyner  YOB: 1945  MRN: [id number]     Time Calculation    Start time: 1400  Stop time: 1440 Time Calculation (min): 40 minutes         Chief Complaint: Back Problem and Difficulty Walking    Visit #: 8    SUBJECTIVE:  Patient reports no falls and only a minimal increase in LBP    OBJECTIVE:  Current objective measures:   Unable to walk in tandem  Patient lost her SPC          Therapeutic Exercises (CPT 60291):     1. Nu step, x10min    2. Trial supine bridge, x10    3. Sit to stand, x5    4. Seated hip abd, x20    5. Clams, x20    Therapeutic Treatments and Modalities:     1. Neuromuscular Re-education (CPT 76367), //bar standing of foam eyes open/closed; standing on 1/2 foam roll/ gait with SPC; stair training on 2in step    Time-based treatments/modalities:    Physical Therapy Timed Treatment Charges  Neuromusc re-ed, balance, coor, post minutes (CPT 72402): 20 minutes  Therapeutic exercise minutes (CPT 95392): 20 minutes      Pain rating (1-10) before treatment:  2  Pain rating (1-10) after treatment:  2    ASSESSMENT:   Response to treatment: Patient responded well to therapy with an improvement on 1/2 foam roller now 26sec. Patient will continue to progress in decreasing her a.d. level.     PLAN/RECOMMENDATIONS:   Plan for treatment: therapy treatment to continue next visit.  Planned interventions for next visit: continue with current treatment.

## 2020-10-06 ENCOUNTER — PHYSICAL THERAPY (OUTPATIENT)
Dept: PHYSICAL THERAPY | Facility: REHABILITATION | Age: 75
End: 2020-10-06
Attending: NURSE PRACTITIONER
Payer: MEDICARE

## 2020-10-06 DIAGNOSIS — Z91.81 RISK FOR FALLS: ICD-10-CM

## 2020-10-06 PROCEDURE — 97112 NEUROMUSCULAR REEDUCATION: CPT

## 2020-10-06 PROCEDURE — 97110 THERAPEUTIC EXERCISES: CPT

## 2020-10-06 NOTE — OP THERAPY DAILY TREATMENT
Outpatient Physical Therapy  DAILY TREATMENT     [facility] [place]  [address]  [facility] [address]  Phone:  [phone]  Fax:  [phone]    Date: 10/06/2020    Patient: Nancie Joyner  YOB: 1945  MRN: [id number]     Time Calculation    Start time: 1030  Stop time: 1100 Time Calculation (min): 30 minutes         Chief Complaint: Difficulty Walking    Visit #: 9    SUBJECTIVE:  Patient reports no falls. Notes her back is feeling good today.     OBJECTIVE:  Current objective measures:   TU.4sec          Therapeutic Exercises (CPT 78428):     1. Nu step, x10min    2. Trial supine bridge, x10    3. Sit to stand, x5    4. Seated hip abd, x20    5. Clams, x20    Therapeutic Treatments and Modalities:     1. Neuromuscular Re-education (CPT 17887), //bar standing of foam eyes open/closed; standing on 1/2 foam roll/ gait with SPC; stair training on 2in step    Time-based treatments/modalities:    Physical Therapy Timed Treatment Charges  Neuromusc re-ed, balance, coor, post minutes (CPT 17020): 15 minutes  Therapeutic exercise minutes (CPT 18536): 15 minutes      Pain rating (1-10) before treatment:  0  Pain rating (1-10) after treatment:  0    ASSESSMENT:   Response to treatment: Patient is demonstrating an overall decrease in TUG time and her ambulation with  SPC if great.     PLAN/RECOMMENDATIONS:   Plan for treatment: therapy treatment to continue next visit.  Planned interventions for next visit: continue with current treatment.

## 2020-10-08 ENCOUNTER — PHYSICAL THERAPY (OUTPATIENT)
Dept: PHYSICAL THERAPY | Facility: REHABILITATION | Age: 75
End: 2020-10-08
Attending: NURSE PRACTITIONER
Payer: MEDICARE

## 2020-10-08 DIAGNOSIS — Z91.81 RISK FOR FALLS: ICD-10-CM

## 2020-10-08 PROCEDURE — 97110 THERAPEUTIC EXERCISES: CPT

## 2020-10-09 NOTE — OP THERAPY DAILY TREATMENT
Outpatient Physical Therapy  DAILY TREATMENT     [facility] [place]  [address]  [facility] [address]  Phone:  [phone]  Fax:  [phone]    Date: 10/08/2020    Patient: Nancie Joyner  YOB: 1945  MRN: [id number]     Time Calculation    Start time: 1030  Stop time: 1100 Time Calculation (min): 30 minutes         Chief Complaint: Difficulty Walking    Visit #: 10    SUBJECTIVE:  Patient continues to report no falls and states she feels stable with her SPC    OBJECTIVE:  Current objective measures:   PT Functional Assessment Tool Used: Andres Balance Scale  PT Functional Assessment Score: 53       Therapeutic Exercises (CPT 10378):     1. Nu step, x10min    2. Trial supine bridge, x10    3. Sit to stand, x5    4. Seated hip abd, x20    5. Clams, x20    6. Mini bridge, x10    7. Standing hip abd, x10    8. SLS trial at //bars      Time-based treatments/modalities:    Physical Therapy Timed Treatment Charges  Therapeutic exercise minutes (CPT 97833): 30 minutes      Pain rating (1-10) before treatment:  0  Pain rating (1-10) after treatment:  0    ASSESSMENT:   Response to treatment: Patient has demonstrated improvement with her BBS score and reports 0 falls.     PLAN/RECOMMENDATIONS:   Plan for treatment: therapy treatment to continue next visit.  Planned interventions for next visit: continue with current treatment.

## 2020-10-09 NOTE — OP THERAPY PROGRESS SUMMARY
Outpatient Physical Therapy  PROGRESS SUMMARY NOTE      [facility] [place]  [address]  [place] [address]  Phone:  [phone]  Fax:  [phone]    Date of Visit: 10/08/2020    Patient: Nancie Joyner  YOB: 1945  MRN: [id number]     Referring Provider: MOUNA Lopez  [address] [place] [address]   Referring Diagnosis History of falling [Z91.81]     Visit Diagnoses     ICD-10-CM   1. Risk for falls  Z91.81       Rehab Potential: good    Progress Report Period: Eval to 10/8/20    Functional Assessment Used  PT Functional Assessment Tool Used: Andres Balance Scale  PT Functional Assessment Score: 53       Objective Findings and Assessment:   Patient progression towards goals: Patient has been seen for 10 visits. Patient reports an overall decrease in falls and improvement in strength. Patient has demonstrated an improved BBS score and is proficient in her use of a SPC. Therapy will continue to focus on increasing mobility and attempt to return to ambulation without an a.d.       Goals:   Short Term Goals:   1) Patient's hip abd strength will improve by a half muscle grade to facilitate improved standing tolerance. Not assessed  2) Patient's syptoms will improve to facilitate decreased sway with standing. Partially met  Short term goal time span:  2-4 weeks      Long Term Goals:    1) Patient's TUG score will improve by 4 sec to demonstrate an improvement in balance.  MET  2) Patient's BBS will improve by 10 to demonstrate functional improvement MET  Long term goal time span:  6-8 weeks    Plan:   Planned therapy interventions:  E Stim Unattended (CPT 93519), Manual Therapy (CPT 13656), Neuromuscular Re-education (CPT 12056), Therapeutic Exercise (CPT 63411) and Gait Training (CPT 59057)  Frequency: 1-2x week.  Duration in weeks:  6      Referring provider co-signature:  I have reviewed this plan of care and my co-signature certifies  the need for services.     Certification Period: 10/08/2020 to 11/20/20    Physician Signature: ________________________________ Date: ______________

## 2020-10-14 ENCOUNTER — PHYSICAL THERAPY (OUTPATIENT)
Dept: PHYSICAL THERAPY | Facility: REHABILITATION | Age: 75
End: 2020-10-14
Attending: NURSE PRACTITIONER
Payer: MEDICARE

## 2020-10-14 DIAGNOSIS — Z91.81 RISK FOR FALLS: ICD-10-CM

## 2020-10-14 PROCEDURE — 97112 NEUROMUSCULAR REEDUCATION: CPT

## 2020-10-14 PROCEDURE — 97110 THERAPEUTIC EXERCISES: CPT

## 2020-10-14 NOTE — OP THERAPY DAILY TREATMENT
Outpatient Physical Therapy  DAILY TREATMENT     [facility] [place]  [address]  [facility] [address]  Phone:  [phone]  Fax:  [phone]    Date: 10/14/2020    Patient: Nancie Joyner  YOB: 1945  MRN: [id number]     Time Calculation    Start time: 1030  Stop time: 1100 Time Calculation (min): 30 minutes         Chief Complaint: Fall and Difficulty Walking    Visit #: 11    SUBJECTIVE:  Patient reports that she has not fallen and notes that overall she is feeling good with her cane.     OBJECTIVE:  Current objective measures:           Therapeutic Exercises (CPT 14817):     1. Nu step, x10min    4. Seated hip abd, x20    7. Standing hip abd, x10    Therapeutic Treatments and Modalities:     1. Neuromuscular Re-education (CPT 44804), // bar standing NBOS eyes closed; tandem standing at // bar ; backwards walking ; standing tandem eyes closed     Time-based treatments/modalities:    Physical Therapy Timed Treatment Charges  Neuromusc re-ed, balance, coor, post minutes (CPT 62935): 20 minutes  Therapeutic exercise minutes (CPT 79799): 10 minutes      Pain rating (1-10) before treatment:  0  Pain rating (1-10) after treatment:  0    ASSESSMENT:   Response to treatment: Patient responded fair to therapy with an increase in fatigue with no increase in LBP.     PLAN/RECOMMENDATIONS:   Plan for treatment: therapy treatment to continue next visit.  Planned interventions for next visit: continue with current treatment.

## 2020-10-16 ENCOUNTER — APPOINTMENT (OUTPATIENT)
Dept: PHYSICAL THERAPY | Facility: REHABILITATION | Age: 75
End: 2020-10-16
Attending: NURSE PRACTITIONER
Payer: MEDICARE

## 2020-10-21 ENCOUNTER — PHYSICAL THERAPY (OUTPATIENT)
Dept: PHYSICAL THERAPY | Facility: REHABILITATION | Age: 75
End: 2020-10-21
Attending: NURSE PRACTITIONER
Payer: MEDICARE

## 2020-10-21 DIAGNOSIS — Z91.81 RISK FOR FALLS: ICD-10-CM

## 2020-10-21 PROCEDURE — 97112 NEUROMUSCULAR REEDUCATION: CPT

## 2020-10-21 PROCEDURE — 97110 THERAPEUTIC EXERCISES: CPT

## 2020-10-22 NOTE — OP THERAPY DAILY TREATMENT
Outpatient Physical Therapy  DAILY TREATMENT     [facility] [place]  [address]  [facility] [address]  Phone:  [phone]  Fax:  [phone]    Date: 10/21/2020    Patient: Nancie Joyner  YOB: 1945  MRN: [id number]     Time Calculation    Start time: 1000  Stop time: 1030 Time Calculation (min): 30 minutes         Chief Complaint: Difficulty Walking    Visit #: 12    SUBJECTIVE:  Patient reports no falls and notes minimal LBP.     OBJECTIVE:  Current objective measures:   TUG no a.d.: 9.82sec          Therapeutic Exercises (CPT 35441):     1. Nu step, x10min    2. Sit to stand, x15    4. Seated hip abd, x20    7. Standing hip abd, x10    Therapeutic Treatments and Modalities:     1. Neuromuscular Re-education (CPT 86776), // bar standing NBOS eyes closed; tandem standing at // bar ; backwards walking ; standing tandem eyes closed ; gait training no a.d.    Time-based treatments/modalities:    Physical Therapy Timed Treatment Charges  Neuromusc re-ed, balance, coor, post minutes (CPT 43784): 20 minutes  Therapeutic exercise minutes (CPT 24786): 10 minutes      Pain rating (1-10) before treatment:  1  Pain rating (1-10) after treatment:  1    ASSESSMENT:   Response to treatment: Patient has demonstrated a significant improvement in stability. Patient now is safe on level ground without an a.d. . Patient advised that if she feel unsure about the surface to use her SPC. Continue 1-2 more visits 1x per week.     PLAN/RECOMMENDATIONS:   Plan for treatment: therapy treatment to continue next visit.  Planned interventions for next visit: continue with current treatment.

## 2020-10-23 ENCOUNTER — APPOINTMENT (OUTPATIENT)
Dept: PHYSICAL THERAPY | Facility: REHABILITATION | Age: 75
End: 2020-10-23
Attending: NURSE PRACTITIONER
Payer: MEDICARE

## 2020-10-28 ENCOUNTER — PHYSICAL THERAPY (OUTPATIENT)
Dept: PHYSICAL THERAPY | Facility: REHABILITATION | Age: 75
End: 2020-10-28
Attending: NURSE PRACTITIONER
Payer: MEDICARE

## 2020-10-28 DIAGNOSIS — Z91.81 RISK FOR FALLS: ICD-10-CM

## 2020-10-28 PROCEDURE — 97112 NEUROMUSCULAR REEDUCATION: CPT

## 2020-10-28 PROCEDURE — 97110 THERAPEUTIC EXERCISES: CPT

## 2020-10-28 NOTE — OP THERAPY DAILY TREATMENT
Outpatient Physical Therapy  DAILY TREATMENT     [facility] [place]  [address]  [facility] [address]  Phone:  [phone]  Fax:  [phone]    Date: 10/28/2020    Patient: Nancie Joyner  YOB: 1945  MRN: [id number]     Time Calculation    Start time: 1030  Stop time: 1100 Time Calculation (min): 30 minutes         Chief Complaint: Difficulty Walking    Visit #: 13    SUBJECTIVE:  Patient reports no falls states she is doing well.     OBJECTIVE:  Current objective measures:           Therapeutic Exercises (CPT 38876):     1. Nu step, x10min    2. Sit to stand, x15    4. Seated hip abd, x20    7. Standing hip abd, x10    Therapeutic Treatments and Modalities:     1. Neuromuscular Re-education (CPT 76059), // bar standing NBOS eyes closed; tandem standing at // bar ; backwards walking ; standing tandem eyes closed ; gait training no a.d.    Time-based treatments/modalities:    Physical Therapy Timed Treatment Charges  Therapeutic exercise minutes (CPT 14789): 30 minutes      Pain rating (1-10) before treatment:  0  Pain rating (1-10) after treatment:  0    ASSESSMENT:   Response to treatment: Expect next visit to be the last visit. Patient is ambulating well with no falls.     PLAN/RECOMMENDATIONS:   Plan for treatment: therapy treatment to continue next visit.  Planned interventions for next visit: continue with current treatment.

## 2020-10-30 ENCOUNTER — APPOINTMENT (OUTPATIENT)
Dept: PHYSICAL THERAPY | Facility: REHABILITATION | Age: 75
End: 2020-10-30
Attending: NURSE PRACTITIONER
Payer: MEDICARE

## 2020-11-05 ENCOUNTER — APPOINTMENT (OUTPATIENT)
Dept: PHYSICAL THERAPY | Facility: REHABILITATION | Age: 75
End: 2020-11-05
Attending: NURSE PRACTITIONER
Payer: MEDICARE

## 2020-11-11 ENCOUNTER — PHYSICAL THERAPY (OUTPATIENT)
Dept: PHYSICAL THERAPY | Facility: REHABILITATION | Age: 75
End: 2020-11-11
Attending: NURSE PRACTITIONER
Payer: MEDICARE

## 2020-11-11 DIAGNOSIS — Z91.81 RISK FOR FALLS: ICD-10-CM

## 2020-11-11 PROCEDURE — 97112 NEUROMUSCULAR REEDUCATION: CPT

## 2020-11-11 PROCEDURE — 97110 THERAPEUTIC EXERCISES: CPT

## 2020-11-11 NOTE — OP THERAPY DISCHARGE SUMMARY
Outpatient Physical Therapy  DISCHARGE SUMMARY NOTE      [facility] [place]  [address]  [place] [address]  Phone:  [phone]  Fax:  [phone]    Date of Visit: 11/11/2020    Patient: Nancie Joyner  YOB: 1945  MRN: [id number]     Referring Provider: MOUNA Lopez  [address] [place] [address]   Referring Diagnosis History of falling [Z91.81]         Functional Assessment Used  PT Functional Assessment Tool Used: BBS  PT Functional Assessment Score: 54/56     Your patient is being discharged from Physical Therapy with the following comments:   · Goals met    Comments:  Discharge to HEP     Limitations Remaining:  Continued LBP    Recommendations:  Discharge to HEP    Lexa Dalton, PT, DPT    Date: 11/11/2020

## 2020-11-11 NOTE — OP THERAPY DAILY TREATMENT
Outpatient Physical Therapy  DAILY TREATMENT     [facility] Outpatient Physical Therapy [place]  [address]  [facility] [address]  Phone:  [phone]  Fax:  [phone]    Date: 2020    Patient: Nancie Joyner  YOB: 1945  MRN: [id number]     Time Calculation    Start time: 0800  Stop time: 0830 Time Calculation (min): 30 minutes         Chief Complaint: Difficulty Walking    Visit #: 14    SUBJECTIVE:  Patient reports no falls. States she has been doing well.     OBJECTIVE:  Current objective measures:   TU.4sec  PT Functional Assessment Tool Used: BBS  PT Functional Assessment Score: 54/56       Therapeutic Exercises (CPT 37081):     1. Nu step, x10min    2. Sit to stand, x15    4. Seated hip abd, x20    7. Standing hip abd, x10    Therapeutic Treatments and Modalities:     1. Neuromuscular Re-education (CPT 30939), corner balance or at table with  behind; tandem to mod tandem with or without eyes open / closed    Time-based treatments/modalities:    Physical Therapy Timed Treatment Charges  Neuromusc re-ed, balance, coor, post minutes (CPT 12107): 10 minutes  Therapeutic exercise minutes (CPT 18730): 20 minutes      Pain rating (1-10) before treatment:  1  Pain rating (1-10) after treatment:  1    ASSESSMENT:   Response to treatment: Patient has demonstrated good improvement. Patient's howe score has improved.    PLAN/RECOMMENDATIONS:   Plan for treatment: discharge patient due to accomplished goals.  Planned interventions for next visit: Discharge to [facility].

## 2020-11-17 ENCOUNTER — APPOINTMENT (OUTPATIENT)
Dept: PHYSICAL THERAPY | Facility: REHABILITATION | Age: 75
End: 2020-11-17
Attending: NURSE PRACTITIONER
Payer: MEDICARE

## 2021-01-11 DIAGNOSIS — Z23 NEED FOR VACCINATION: ICD-10-CM

## 2021-01-21 ENCOUNTER — TELEMEDICINE (OUTPATIENT)
Dept: NEUROLOGY | Facility: MEDICAL CENTER | Age: 76
End: 2021-01-21
Attending: NURSE PRACTITIONER
Payer: MEDICARE

## 2021-01-21 DIAGNOSIS — G40.802 OTHER EPILEPSY WITHOUT STATUS EPILEPTICUS, NOT INTRACTABLE (HCC): ICD-10-CM

## 2021-01-21 PROCEDURE — 99999 PR NO CHARGE: CPT | Performed by: NURSE PRACTITIONER

## 2021-01-21 RX ORDER — PHENYTOIN SODIUM 100 MG/1
CAPSULE, EXTENDED RELEASE ORAL
Qty: 450 CAP | Refills: 1 | Status: SHIPPED | OUTPATIENT
Start: 2021-01-21 | End: 2021-10-08

## 2021-02-18 ENCOUNTER — TELEPHONE (OUTPATIENT)
Dept: MEDICAL GROUP | Facility: PHYSICIAN GROUP | Age: 76
End: 2021-02-18

## 2021-02-18 NOTE — TELEPHONE ENCOUNTER
1. Caller Name: Cristhian (Pt's )                        Call Back Number: [phone] (home)         How would the patient prefer to be contacted with a response: Phone call do NOT leave a detailed message    Pt's  called in regards to a referral that was placed in July 2020 Cristhian stated that the secondary ins. [employer] will not pay for the remaining portion of the physical therapy due to a diagnosis code. He is wondering if we are able to change the diagnosis code on the original referral or if we could add a code and re send the bill so they do not have to pay out of pocket. He stated that [employer] said the code should be for Musculoskeletal function loss, the code I found for that was R29.898. please let me know what we can do about this.

## 2021-02-18 NOTE — TELEPHONE ENCOUNTER
Spoke to [employer] representative & Pt's , [employer] stated that they can not send a request form to change any codes for billing as that would be considered fraud, they also stated that they can not tell a provider what codes to use to help with billing & that they were just informing the Pt that the reason the bill was not covered was because the code was not for musculoskeletal function loss. Pt's  was informed of this & understands that he has to file an appeal for the billing.

## 2021-02-18 NOTE — TELEPHONE ENCOUNTER
Please have the patient's  contact [facility] to see if there are any forms that we can sign so that they can have the appropriate diagnosis code.

## 2021-03-03 ENCOUNTER — OFFICE VISIT (OUTPATIENT)
Dept: NEUROLOGY | Facility: MEDICAL CENTER | Age: 76
End: 2021-03-03
Attending: NURSE PRACTITIONER
Payer: MEDICARE

## 2021-03-03 VITALS
DIASTOLIC BLOOD PRESSURE: 70 MMHG | HEIGHT: 62 IN | RESPIRATION RATE: 16 BRPM | BODY MASS INDEX: 37.49 KG/M2 | WEIGHT: 203.71 LBS | SYSTOLIC BLOOD PRESSURE: 122 MMHG | OXYGEN SATURATION: 96 % | HEART RATE: 87 BPM | TEMPERATURE: 97.3 F

## 2021-03-03 DIAGNOSIS — G40.802 OTHER EPILEPSY WITHOUT STATUS EPILEPTICUS, NOT INTRACTABLE (HCC): ICD-10-CM

## 2021-03-03 DIAGNOSIS — Z91.81 RISK FOR FALLS: ICD-10-CM

## 2021-03-03 PROCEDURE — 99214 OFFICE O/P EST MOD 30 MIN: CPT | Performed by: NURSE PRACTITIONER

## 2021-03-03 PROCEDURE — 99211 OFF/OP EST MAY X REQ PHY/QHP: CPT | Performed by: NURSE PRACTITIONER

## 2021-03-03 ASSESSMENT — FIBROSIS 4 INDEX: FIB4 SCORE: 1.89

## 2021-03-03 ASSESSMENT — ENCOUNTER SYMPTOMS
HEADACHES: 0
DIARRHEA: 0
CONSTITUTIONAL NEGATIVE: 1
DEPRESSION: 0
VOMITING: 0
COUGH: 0
SORE THROAT: 0
NERVOUS/ANXIOUS: 0
DOUBLE VISION: 0
ABDOMINAL PAIN: 0
NAUSEA: 0

## 2021-03-03 ASSESSMENT — PATIENT HEALTH QUESTIONNAIRE - PHQ9: CLINICAL INTERPRETATION OF PHQ2 SCORE: 0

## 2021-03-03 NOTE — PROGRESS NOTES
"Subjective:      Nancie Joyner is a 75 y.o. female who presents with No chief complaint on file.          HPI  Present by herself today.    Every once in awhile she will feel a \"zap\" in her brain, lasting a few minutes.  Usually noticed in the afternoon when she is watching TV.  She is sometimes tired during this time, in the afternoon hours.    Has been at home isolated, watching TV a lot.  She has minimal activity daily.    Sleep:  Gets up sometimes at 2am to urinate then able to get back to sleep.  Has a very stuffy nose attributed to a pellet stove and with environmental allergies.  Has a  now.     Falls:  Unclear time frame?  No apparent injury at this time.  Fell down a set of 3 stairs going into her garage and was carrying some items.  Wearing her glasses which bruised up her face from the impact.    Another fall occurred in her bathroom when she was turning around and fell into her bath tub.    She is diagnosed with arthritis in her back and has \"bad knees\".  Has has been with physical therapy in the past.  She continues to do some exercises at home.    3/2018 EEG INTERPRETATION:  This is an abnormal video EEG recording in the awake and drowsy state(s).  Frequent right temporal sharps noted. The findings increase risk for seizures and suggest underlying area of cortical irritability and structural abnormality. Clinical and radiological correlation is recommended.    8/2020 DEXA IMPRESSION:   According to the World Health Organization classification, bone mineral density of this patient is normal in the left forearm and left proximal femur       Ref. Range 5/19/2020 06:19   Phenytoin Latest Ref Range: 10.0 - 20.0 ug/mL 10.9   25-Hydroxy   Vitamin D 25 Latest Ref Range: 30 - 100 ng/mL 59     She has completed X2 COVID vaccinations.    Current Outpatient Medications   Medication Sig Dispense Refill   • phenytoin ER (DILANTIN) 100 MG Cap Take 5 capsules PO qhs. 450 Cap 1   • naproxen " (NAPROSYN) 500 MG Tab      • cetirizine (ZYRTEC) 10 MG Tab Take 1 Tab by mouth every day. 90 Tab 0   • fluticasone (FLONASE) 50 MCG/ACT nasal spray Spray 1 Spray in nose every day. 16 g 4   • Calcium-Magnesium-Vitamin D (CALCIUM 500 PO) Take  by mouth.     • Multiple Vitamin (MULTI-VITAMINS PO) Take  by mouth.     • Cholecalciferol (VITAMIN D PO) Take  by mouth.     • Cyanocobalamin (VITAMIN B12 PO) Take  by mouth.       No current facility-administered medications for this visit.       Review of Systems   Constitutional: Negative.    HENT: Negative for hearing loss, nosebleeds and sore throat.         No recent head injury.   Eyes: Negative for double vision.        No new loss of vision.   Respiratory: Negative for cough.         No recent lung infections.   Cardiovascular: Negative for chest pain.   Gastrointestinal: Negative for abdominal pain, diarrhea, nausea and vomiting.   Genitourinary: Negative.    Musculoskeletal: Positive for back pain (lower back pain).   Skin: Negative.    Neurological: Positive for weakness (generalized). Negative for headaches.   Endo/Heme/Allergies:        No history of endocrine dysfunction.  No new problems.   Psychiatric/Behavioral: Positive for memory loss (chronic). Negative for depression. The patient is not nervous/anxious.         No recent mood changes.          Objective:     There were no vitals taken for this visit.     Physical Exam  Constitutional:       Appearance: She is well-developed. She is obese.   HENT:      Head: Normocephalic and atraumatic.      Nose: Nose normal.   Eyes:      Extraocular Movements: Extraocular movements intact.      Comments: Glasses to read only  Hard of hearing   Cardiovascular:      Rate and Rhythm: Normal rate and regular rhythm.   Pulmonary:      Effort: Pulmonary effort is normal.   Musculoskeletal:         General: Normal range of motion.      Cervical back: Normal range of motion.   Skin:     General: Skin is warm.   Neurological:       Mental Status: She is alert and oriented to person, place, and time.      Motor: No abnormal muscle tone.      Gait: Gait abnormal (guarded).      Comments: No observable changes in neurologic status.  See initial new patient examination for details.    Psychiatric:         Mood and Affect: Affect is flat.         Cognition and Memory: Cognition is impaired.      Comments: Learning delay              Assessment/Plan:        History of Epilepsy:  Onset of seizures in her 40's, 35+ years ago.  Seizures concerning for partial seizure type.  Last concern for seizures was March 2017.  No other AED's tried and failed.     Sleep dysfunction:  Awakening to urinate but otherwise sleeps well.    3/2018 EEG: Frequent right temporal sharps.     Counseled at length regarding the phenytoin.  She does not wish to entertain a change in AED at this time.  Continue phenytoin ER 500mg qhs.  Needs to supplement with Calcium 2000mg and Vit D 2000IU per day.     Reviewed EEG results from the past in 2018.  Obtain REEG to evaluate for subclinical seizures.     Encouraged to continue her at home exercises as recommended per physical therapy.    Return for follow-up in 6 months or sooner if needed.        EDUCATION AND COUNSELING:  -Education was provided to the patient and/or family regarding diagnosis and prognosis. The chronic and unpredictable nature of the condition was discussed. There is increased risk for additional events, which may carry potential for significant injuries and death.    -We reviewed the current antiepileptic regimen. Potential side effects of antiepileptics were discussed at length, including but no limited to: hypersensitivity reactions (rash and others, some of which can be fatal), visual field changes (some of which may be irreversible), glaucoma, diplopia, kidney stones, osteopenia/osteoporosis/bone fractures, hyperthermia/anhydrosis, tremors, ataxia, dizziness, fatigue, increased risk for falls, cardiac  arrhythmias/syncope, gastrointestinal (hepatitis, pancreatitis, gastritis, ulcers), gingival hypertrophy, drowsiness, sedation, anxiety/nervousness, increased risk for suicide, increased risk for depression, and psychosis. We reviewed drug-drug interactions and their potential effect on seizure control and medication side effects.    -Patient/family educated on SUDEP (Sudden Death in Epilepsy). Counseling was provided on the importance of strict medication and follow up compliance. The patient understands the risks associated with non-adherence with the medical plan as outlined, including but not limited to an increased risk for breakthrough seizures, which may contribute to injuries, disability, status epilepticus, and even death.    -Counseling was also provided on potential effects of alcohol and other drugs, which may lower seizure threshold and/or affect the metabolism of antiepileptic drugs. I recommend avoidance of alcohol and illegal drugs.  -Recommend proper hydration, regular exercise, proper sleep hygiene (7-8 hrs of overnight sleep, avoid sleep deprivation).    -I have made the patient aware of mandatory reporting required by the law in the State of [place] regarding episodes of seizures, loss of consciousness, and/or alteration of awareness. The patient and family are responsible for reporting events to the DMV, instructions were provided. The patient verbalized understanding and states she has not been driving. Other seizure precautions were discussed at length, including no diving, no skydiving, no unsupervised swimming, no Jacuzzi or bathing in bathtubs.          Pt agrees with plan.

## 2021-03-08 ENCOUNTER — APPOINTMENT (OUTPATIENT)
Dept: MEDICAL GROUP | Facility: PHYSICIAN GROUP | Age: 76
End: 2021-03-08
Payer: MEDICARE

## 2021-03-08 ASSESSMENT — ENCOUNTER SYMPTOMS
WEAKNESS: 1
BACK PAIN: 1
MEMORY LOSS: 1

## 2021-05-13 DIAGNOSIS — J30.2 SEASONAL ALLERGIES: ICD-10-CM

## 2021-05-13 RX ORDER — FLUTICASONE PROPIONATE 50 MCG
SPRAY, SUSPENSION (ML) NASAL
Qty: 16 G | Refills: 0 | Status: SHIPPED | OUTPATIENT
Start: 2021-05-13 | End: 2021-07-22

## 2021-05-13 NOTE — TELEPHONE ENCOUNTER
Requested Prescriptions     Pending Prescriptions Disp Refills   • fluticasone (FLONASE) 50 MCG/ACT nasal spray [Pharmacy Med Name: FLUTICASONE PROPIONATE 50 SUSP] 16 g 0     Sig: USE 1 SPRAY IN EACH NOSTRIL EVERY DAY       HUSSEIN Lopez.

## 2021-05-13 NOTE — TELEPHONE ENCOUNTER
Received request via: Pharmacy    Was the patient seen in the last year in this department? 3/28/19    Does the patient have an active prescription (recently filled or refills available) for medication(s) requested? No

## 2021-06-10 ENCOUNTER — HOSPITAL ENCOUNTER (OUTPATIENT)
Dept: LAB | Facility: MEDICAL CENTER | Age: 76
End: 2021-06-10
Attending: NURSE PRACTITIONER
Payer: MEDICARE

## 2021-06-10 DIAGNOSIS — N18.30 CKD (CHRONIC KIDNEY DISEASE) STAGE 3, GFR 30-59 ML/MIN: ICD-10-CM

## 2021-06-10 DIAGNOSIS — E66.09 CLASS 2 OBESITY DUE TO EXCESS CALORIES WITHOUT SERIOUS COMORBIDITY WITH BODY MASS INDEX (BMI) OF 35.0 TO 35.9 IN ADULT: ICD-10-CM

## 2021-06-10 DIAGNOSIS — E78.5 DYSLIPIDEMIA: ICD-10-CM

## 2021-06-10 DIAGNOSIS — E55.9 VITAMIN D DEFICIENCY: ICD-10-CM

## 2021-06-10 DIAGNOSIS — E53.8 VITAMIN B12 DEFICIENCY: ICD-10-CM

## 2021-06-10 LAB
25(OH)D3 SERPL-MCNC: 59 NG/ML (ref 30–100)
ALBUMIN SERPL BCP-MCNC: 3.9 G/DL (ref 3.2–4.9)
ALBUMIN/GLOB SERPL: 1.4 G/DL
ALP SERPL-CCNC: 91 U/L (ref 30–99)
ALT SERPL-CCNC: 32 U/L (ref 2–50)
ANION GAP SERPL CALC-SCNC: 9 MMOL/L (ref 7–16)
AST SERPL-CCNC: 29 U/L (ref 12–45)
BASOPHILS # BLD AUTO: 0.7 % (ref 0–1.8)
BASOPHILS # BLD: 0.03 K/UL (ref 0–0.12)
BILIRUB SERPL-MCNC: 0.3 MG/DL (ref 0.1–1.5)
BUN SERPL-MCNC: 15 MG/DL (ref 8–22)
CALCIUM SERPL-MCNC: 8.8 MG/DL (ref 8.5–10.5)
CHLORIDE SERPL-SCNC: 106 MMOL/L (ref 96–112)
CHOLEST SERPL-MCNC: 182 MG/DL (ref 100–199)
CO2 SERPL-SCNC: 26 MMOL/L (ref 20–33)
CREAT SERPL-MCNC: 0.77 MG/DL (ref 0.5–1.4)
EOSINOPHIL # BLD AUTO: 0.1 K/UL (ref 0–0.51)
EOSINOPHIL NFR BLD: 2.2 % (ref 0–6.9)
ERYTHROCYTE [DISTWIDTH] IN BLOOD BY AUTOMATED COUNT: 46.4 FL (ref 35.9–50)
FASTING STATUS PATIENT QL REPORTED: NORMAL
GLOBULIN SER CALC-MCNC: 2.8 G/DL (ref 1.9–3.5)
GLUCOSE SERPL-MCNC: 95 MG/DL (ref 65–99)
HCT VFR BLD AUTO: 45 % (ref 37–47)
HDLC SERPL-MCNC: 52 MG/DL
HGB BLD-MCNC: 14.5 G/DL (ref 12–16)
IMM GRANULOCYTES # BLD AUTO: 0.01 K/UL (ref 0–0.11)
IMM GRANULOCYTES NFR BLD AUTO: 0.2 % (ref 0–0.9)
LDLC SERPL CALC-MCNC: 98 MG/DL
LYMPHOCYTES # BLD AUTO: 1.72 K/UL (ref 1–4.8)
LYMPHOCYTES NFR BLD: 38.3 % (ref 22–41)
MCH RBC QN AUTO: 33.2 PG (ref 27–33)
MCHC RBC AUTO-ENTMCNC: 32.2 G/DL (ref 33.6–35)
MCV RBC AUTO: 103 FL (ref 81.4–97.8)
MONOCYTES # BLD AUTO: 0.36 K/UL (ref 0–0.85)
MONOCYTES NFR BLD AUTO: 8 % (ref 0–13.4)
NEUTROPHILS # BLD AUTO: 2.27 K/UL (ref 2–7.15)
NEUTROPHILS NFR BLD: 50.6 % (ref 44–72)
NRBC # BLD AUTO: 0 K/UL
NRBC BLD-RTO: 0 /100 WBC
PLATELET # BLD AUTO: 215 K/UL (ref 164–446)
PMV BLD AUTO: 10.3 FL (ref 9–12.9)
POTASSIUM SERPL-SCNC: 4.3 MMOL/L (ref 3.6–5.5)
PROT SERPL-MCNC: 6.7 G/DL (ref 6–8.2)
RBC # BLD AUTO: 4.37 M/UL (ref 4.2–5.4)
SODIUM SERPL-SCNC: 141 MMOL/L (ref 135–145)
TRIGL SERPL-MCNC: 160 MG/DL (ref 0–149)
TSH SERPL DL<=0.005 MIU/L-ACNC: 3.85 UIU/ML (ref 0.38–5.33)
VIT B12 SERPL-MCNC: 922 PG/ML (ref 211–911)
WBC # BLD AUTO: 4.5 K/UL (ref 4.8–10.8)

## 2021-06-10 PROCEDURE — 82607 VITAMIN B-12: CPT

## 2021-06-10 PROCEDURE — 80053 COMPREHEN METABOLIC PANEL: CPT

## 2021-06-10 PROCEDURE — 82306 VITAMIN D 25 HYDROXY: CPT

## 2021-06-10 PROCEDURE — 36415 COLL VENOUS BLD VENIPUNCTURE: CPT

## 2021-06-10 PROCEDURE — 80061 LIPID PANEL: CPT

## 2021-06-10 PROCEDURE — 84443 ASSAY THYROID STIM HORMONE: CPT

## 2021-06-10 PROCEDURE — 85025 COMPLETE CBC W/AUTO DIFF WBC: CPT

## 2021-07-15 ENCOUNTER — OFFICE VISIT (OUTPATIENT)
Dept: MEDICAL GROUP | Facility: PHYSICIAN GROUP | Age: 76
End: 2021-07-15
Payer: MEDICARE

## 2021-07-15 VITALS
OXYGEN SATURATION: 93 % | TEMPERATURE: 97.6 F | DIASTOLIC BLOOD PRESSURE: 64 MMHG | HEIGHT: 61 IN | WEIGHT: 202 LBS | HEART RATE: 90 BPM | BODY MASS INDEX: 38.14 KG/M2 | SYSTOLIC BLOOD PRESSURE: 116 MMHG

## 2021-07-15 DIAGNOSIS — Z12.31 ENCOUNTER FOR SCREENING MAMMOGRAM FOR BREAST CANCER: ICD-10-CM

## 2021-07-15 DIAGNOSIS — E78.5 DYSLIPIDEMIA: ICD-10-CM

## 2021-07-15 DIAGNOSIS — E66.09 CLASS 2 OBESITY DUE TO EXCESS CALORIES WITHOUT SERIOUS COMORBIDITY WITH BODY MASS INDEX (BMI) OF 38.0 TO 38.9 IN ADULT: ICD-10-CM

## 2021-07-15 DIAGNOSIS — K59.09 OTHER CONSTIPATION: ICD-10-CM

## 2021-07-15 DIAGNOSIS — E53.8 VITAMIN B12 DEFICIENCY: ICD-10-CM

## 2021-07-15 DIAGNOSIS — E55.9 VITAMIN D DEFICIENCY: ICD-10-CM

## 2021-07-15 DIAGNOSIS — N18.30 STAGE 3 CHRONIC KIDNEY DISEASE, UNSPECIFIED WHETHER STAGE 3A OR 3B CKD: ICD-10-CM

## 2021-07-15 DIAGNOSIS — G40.802 OTHER EPILEPSY WITHOUT STATUS EPILEPTICUS, NOT INTRACTABLE (HCC): ICD-10-CM

## 2021-07-15 PROCEDURE — 99214 OFFICE O/P EST MOD 30 MIN: CPT | Performed by: NURSE PRACTITIONER

## 2021-07-15 ASSESSMENT — FIBROSIS 4 INDEX: FIB4 SCORE: 1.84

## 2021-07-15 NOTE — ASSESSMENT & PLAN NOTE
Chronic, ongoing without medication.  Declined starting medication.  Continues to work on decreasing portion size, increasing fresh fruits, and low impact exercise.  Due for annual labs in June 2022.  The 10-year ASCVD risk score (Elle RUDY Jr., et al., 2013) is: 16.5%

## 2021-07-15 NOTE — ASSESSMENT & PLAN NOTE
Chronic, ongoing. Continues to follow with neurology, due for follow up in September 2021.  Continues phenytoin  mg nightly, managed by neurology.  Reports epilepsy is well controlled with medication.

## 2021-07-15 NOTE — PROGRESS NOTES
"CC:   Chief Complaint   Patient presents with   • Lab Results     HISTORY OF THE PRESENT ILLNESS: Patient is a 75 y.o. female. This pleasant patient is here today to review recent lab results and discuss issues listed below.    Health Maintenance: Completed    Other constipation  This is a new problem to the examiner. Ongoing problem for the patient for a few weeks. Reports that she feels constipated daily. Has a small, firm bowel movement daily, but feels like she has to strain to pass the stool. She has not tried any medications or treatments. Denies blood in stools, nausea, vomiting. She also noted an increase in back pain with the constipation. Does not drink enough water. Enjoys eating fresh fruit, cherries, apples, grapes.     Class 2 obesity due to excess calories with body mass index (BMI) of 38.0 to 38.9 in adult  Chronic, ongoing.  Reports that she has been doing \"[facility]\" [facility] and doing daily home exercises/stretches.  Reports that her activity is limited due to chronic bilateral knee and right foot pain.  Continues to work on decreasing portion size, enjoys fresh fruit.  Due for annual labs in June 2022.  Vitals 7/27/2020 3/3/2021 7/15/2021   WEIGHT 194 203.71 202   HEIGHT 5' 2\" 5' 2.008\" 5' 1\"   BMI 35.48 kg/m2 37.25 kg/m2 38.17 kg/m2       CKD (chronic kidney disease) stage 3, GFR 30-59 ml/min ([facility])  Resolved.    Vitamin D deficiency  Chronic, stable. Continues OTC vitamin d supplement daily.  Due for annual labs in June 2022.    Vitamin B12 deficiency  Chronic, stable.  Continues over-the-counter vitamin B12 supplement daily.  Due for annual labs in June 2022.    Dyslipidemia  Chronic, ongoing without medication.  Declined starting medication.  Continues to work on decreasing portion size, increasing fresh fruits, and low impact exercise.  Due for annual labs in June 2022.  The 10-year ASCVD risk score (Elle RUDY Jr., et al., 2013) is: 16.5% "     Epilepsy  Chronic, ongoing. Continues to follow with neurology, due for follow up in 2021.  Continues phenytoin  mg nightly, managed by neurology.  Reports epilepsy is well controlled with medication.    Allergies: Patient has no known allergies.  Current Outpatient Medications Ordered in Epic   Medication Sig Dispense Refill   • Psyllium (METAMUCIL) 28 % packet Take 1 Packet by mouth 2 times a day for 30 days. 60 Each 0   • fluticasone (FLONASE) 50 MCG/ACT nasal spray USE 1 SPRAY IN EACH NOSTRIL EVERY DAY 16 g 0   • phenytoin ER (DILANTIN) 100 MG Cap Take 5 capsules PO qhs. 450 Cap 1   • naproxen (NAPROSYN) 500 MG Tab      • Calcium-Magnesium-Vitamin D (CALCIUM 500 PO) Take  by mouth.     • Multiple Vitamin (MULTI-VITAMINS PO) Take  by mouth.     • Cholecalciferol (VITAMIN D PO) Take  by mouth.     • Cyanocobalamin (VITAMIN B12 PO) Take  by mouth.       No current Epic-ordered facility-administered medications on file.     Past Medical History:   Diagnosis Date   • Basal cell carcinoma 2015    left eyelid   • CKD (chronic kidney disease) stage 3, GFR 30-59 ml/min ([facility]) 2016   • Epilepsy ([facility]) 2016   • Osteopenia 2016   • Seasonal allergies 2016   • Seizure disorder ([facility])    • UI (urinary incontinence) 2016   • Varicose veins 2016   • Vitamin B12 deficiency 2016   • Vitamin D deficiency 2016     Past Surgical History:   Procedure Laterality Date   • CHOLECYSTECTOMY     • TUBAL COAGULATION LAPAROSCOPIC BILATERAL     • ABDOMINAL HYSTERECTOMY TOTAL     • VEIN STRIPPING       Social History     Tobacco Use   • Smoking status: Former Smoker     Packs/day: 0.50     Years: 2.00     Pack years: 1.00     Types: Cigarettes     Start date: 1977     Quit date: 3/28/1979     Years since quittin.3   • Smokeless tobacco: Never Used   Vaping Use   • Vaping Use: Never used   Substance Use Topics   • Alcohol use: No     Alcohol/week: 0.0 oz   • Drug use: No  "    Social History     Social History Narrative   • Not on file     Family History   Problem Relation Age of Onset   • Cancer Sister         sinus   • Alzheimer's Disease Sister    • Lung Cancer Father    • Cancer Mother         STOMACH   • Heart Disease Sister    • Cancer Sister    • Heart Disease Sister    • Cancer Brother         pancreatic cancer   • Seizures Paternal Aunt    • No Known Problems Maternal Grandmother    • No Known Problems Maternal Grandfather    • No Known Problems Paternal Grandmother    • No Known Problems Paternal Grandfather    • Breast Cancer Sister      ROS:   Constitutional: No fevers, chills, malaise/fatigue.  Eyes: No eye pain.  ENT: No sore throat, congestion.   Resp: No cough, shortness of breath.  CV: No chest pain, leg swelling, palpitations.  GI: + Constipation.  No nausea/vomiting, abdominal pain, constipation, diarrhea.  : No dysuria, hematuria.  MSK: + Chronic bilateral knee pain, right foot pain, back pain.    Skin: No rashes.  Neuro: + Generalized weakness.  No dizziness, headaches.  Psych: + Chronic memory loss.  No suicidal ideations.    All remaining systems reviewed and found to be negative, except as stated above.        Exam: /64 (BP Location: Left arm, Patient Position: Sitting, BP Cuff Size: Adult)   Pulse 90   Temp 36.4 °C (97.6 °F) (Temporal)   Ht 1.549 m (5' 1\")   Wt 91.6 kg (202 lb)   SpO2 93%  Body mass index is 38.17 kg/m².    General: Well nourished, well developed female in NAD, awake and conversant.  Eyes: Normal conjunctiva, anicteric.  Round symmetrical pupils.  ENT: Hard of hearing.  No nasal discharge.  Neck: Neck is supple.  No masses or thyromegaly.  CV: No lower extremity edema.  Respiratory: Respirations are nonlabored.  No wheezing.  Abdomen: Obese, Non-Distended.  Skin: Warm.  No rashes or ulcers.  MSK: Normal ambulation.  No clubbing or cyanosis.  Neuro: Sensation and CN II-XII grossly normal.  Psych: Alert and oriented.  Cooperative, " appropriate mood and flat affect, normal judgment.     Assessment/Plan:  1. Other constipation  New problem to examiner, ongoing problem for the patient for a couple of weeks.  Recommend psyllium husk twice daily for 30 days, over-the-counter MiraLAX daily until bowel movements are regular.  Increase water intake.  Recommend prunes daily.  Continue regular exercise.  Return to be seen if problem worsens or does not improve.  - Psyllium (METAMUCIL) 28 % packet; Take 1 Packet by mouth 2 times a day for 30 days.  Dispense: 60 Each; Refill: 0    2. Other epilepsy without status epilepticus, not intractable (HCC)  Chronic, ongoing.  Continue to follow with neurology.  Reminded patient to schedule follow-up for September 2021.  Continue medications as prescribed by neurology.    3. Class 2 obesity due to excess calories without serious comorbidity with body mass index (BMI) of 38.0 to 38.9 in adult  Chronic, ongoing.  Continue bingo size twice weekly and home exercises.  Encouraged diet high in fruits, vegetables, and fiber. And a diet low in salt, refined carbohydrates, cholesterol, saturated fat, and trans fatty acids.    Encouraged a minimum of 30 minutes of moderate intensity aerobic exercise (eg, brisk walking) is recommended on five days each week. Or 30 minutes of vigorous-intensity aerobic exercise (eg, jogging) on three days each week.   Patient's body mass index is 38.17 kg/m². Exercise and nutrition counseling were performed at this visit.  Due for annual labs in June 2022.    4. Dyslipidemia  Chronic, ongoing without medication.  Encouraged continued healthy diet, exercise, weight loss.  Due for annual labs in June 2022.    5. Vitamin D deficiency  Chronic, stable.  Continue vitamin D supplement daily.  Due for updated annual labs in June 2022.    6. Vitamin B12 deficiency  Chronic, ongoing.  Continue vitamin B12 supplement daily.  Due for annual labs in June 2022.    7. Stage 3 chronic kidney disease,  unspecified whether stage 3a or 3b CKD (HCC)  Resolved.    8. Encounter for screening mammogram for breast cancer  Due for screening.  - MA-SCREENING MAMMO BILAT W/CAD; Future     Educated in proper administration of medication(s) ordered today including safety, possible SE, risks, benefits, rationale and alternatives to therapy.   Supportive care, differential diagnoses, and indications for immediate follow-up discussed with patient.    Pathogenesis of diagnosis discussed including typical length and natural progression.    Instructed to return to clinic or nearest emergency department for any change in condition, further concerns, or worsening of symptoms.  Patient states understanding of the plan of care and discharge instructions.    Return in about 6 months (around 1/15/2022) for HC/PCP Annual Wellness Visit- Medicare.    Please note that this dictation was created using voice recognition software. I have made every reasonable attempt to correct obvious errors, but I expect that there are errors of grammar and possibly content that I did not discover before finalizing the note.

## 2021-07-15 NOTE — ASSESSMENT & PLAN NOTE
Chronic, stable.  Continues over-the-counter vitamin B12 supplement daily.  Due for annual labs in June 2022.

## 2021-07-15 NOTE — ASSESSMENT & PLAN NOTE
"Chronic, ongoing.  Reports that she has been doing \"bingo size\" every Tuesday and Thursday at the senior center and doing daily home exercises/stretches.  Reports that her activity is limited due to chronic bilateral knee and right foot pain.  Continues to work on decreasing portion size, enjoys fresh fruit.  Due for annual labs in June 2022.  Vitals 7/27/2020 3/3/2021 7/15/2021   WEIGHT 194 203.71 202   HEIGHT 5' 2\" 5' 2.008\" 5' 1\"   BMI 35.48 kg/m2 37.25 kg/m2 38.17 kg/m2     "

## 2021-07-15 NOTE — ASSESSMENT & PLAN NOTE
This is a new problem to the examiner. Ongoing problem for the patient for a few weeks. Reports that she feels constipated daily. Has a small, firm bowel movement daily, but feels like she has to strain to pass the stool. She has not tried any medications or treatments. Denies blood in stools, nausea, vomiting. She also noted an increase in back pain with the constipation. Does not drink enough water. Enjoys eating fresh fruit, cherries, apples, grapes.

## 2021-07-22 DIAGNOSIS — J30.2 SEASONAL ALLERGIES: ICD-10-CM

## 2021-07-22 RX ORDER — FLUTICASONE PROPIONATE 50 MCG
SPRAY, SUSPENSION (ML) NASAL
Qty: 16 G | Refills: 11 | Status: SHIPPED | OUTPATIENT
Start: 2021-07-22 | End: 2021-12-07 | Stop reason: SDUPTHER

## 2021-07-23 NOTE — TELEPHONE ENCOUNTER
Requested Prescriptions     Pending Prescriptions Disp Refills   • fluticasone (FLONASE) 50 MCG/ACT nasal spray [Pharmacy Med Name: FLUTICASONE PROPIONATE 50 SUSP] 16 g 11     Sig: USE 1 SPRAY IN EACH NOSTRIL EVERY DAY (60 DAYS SUPPLY)       HUSSEIN Lopez.

## 2021-09-01 ENCOUNTER — HOSPITAL ENCOUNTER (OUTPATIENT)
Dept: RADIOLOGY | Facility: MEDICAL CENTER | Age: 76
End: 2021-09-01
Attending: NURSE PRACTITIONER
Payer: MEDICARE

## 2021-09-01 DIAGNOSIS — Z12.31 ENCOUNTER FOR SCREENING MAMMOGRAM FOR BREAST CANCER: ICD-10-CM

## 2021-09-01 PROCEDURE — 77063 BREAST TOMOSYNTHESIS BI: CPT

## 2021-10-12 ENCOUNTER — TELEPHONE (OUTPATIENT)
Dept: MEDICAL GROUP | Facility: PHYSICIAN GROUP | Age: 76
End: 2021-10-12

## 2021-10-12 ENCOUNTER — OFFICE VISIT (OUTPATIENT)
Dept: MEDICAL GROUP | Facility: PHYSICIAN GROUP | Age: 76
End: 2021-10-12
Payer: MEDICARE

## 2021-10-12 VITALS
WEIGHT: 204 LBS | BODY MASS INDEX: 36.14 KG/M2 | DIASTOLIC BLOOD PRESSURE: 90 MMHG | HEART RATE: 88 BPM | SYSTOLIC BLOOD PRESSURE: 160 MMHG | HEIGHT: 63 IN | TEMPERATURE: 97.4 F | OXYGEN SATURATION: 93 %

## 2021-10-12 DIAGNOSIS — U07.1 COVID-19: ICD-10-CM

## 2021-10-12 DIAGNOSIS — R05.9 COUGH: ICD-10-CM

## 2021-10-12 DIAGNOSIS — J30.2 SEASONAL ALLERGIES: ICD-10-CM

## 2021-10-12 LAB
EXTERNAL QUALITY CONTROL: NORMAL
SARS-COV+SARS-COV-2 AG RESP QL IA.RAPID: POSITIVE

## 2021-10-12 PROCEDURE — 87426 SARSCOV CORONAVIRUS AG IA: CPT | Performed by: NURSE PRACTITIONER

## 2021-10-12 PROCEDURE — 99214 OFFICE O/P EST MOD 30 MIN: CPT | Mod: CS | Performed by: NURSE PRACTITIONER

## 2021-10-12 RX ORDER — CETIRIZINE HYDROCHLORIDE 10 MG/1
10 TABLET ORAL DAILY
Qty: 90 TABLET | Refills: 3 | Status: SHIPPED | OUTPATIENT
Start: 2021-10-12

## 2021-10-12 RX ORDER — POLYETHYLENE GLYCOL 3350, SODIUM CHLORIDE, SODIUM BICARBONATE, POTASSIUM CHLORIDE 420; 11.2; 5.72; 1.48 G/4L; G/4L; G/4L; G/4L
POWDER, FOR SOLUTION ORAL
COMMUNITY
Start: 2021-09-30 | End: 2023-02-02

## 2021-10-12 ASSESSMENT — FIBROSIS 4 INDEX: FIB4 SCORE: 1.86

## 2021-10-12 NOTE — ASSESSMENT & PLAN NOTE
Chronic, ongoing. Continues flonase nasal spray as needed. Reports worsening of sinus congestion at night and phlegm production in the morning. Over the last four days has had non-productive cough, runny nose, and sniffles.
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
## 2021-10-12 NOTE — TELEPHONE ENCOUNTER
Phone Number Called: [phone] (Cell)    At Bindu's request, I called the patient and informed her that her COVID test was positive.  I explained her that she needed to isolate for ten days from the day symptoms began.  I reviewed the criteria for ending quarantine.  The patient was told that she needed to cancel the colonoscppy she had scheduled for tomorrow.  I  told the patient that if she developed shortness of breath or chest pain that she needed to seek care at the emergency department.  The patient told me that her symptoms had started with sneezing and that her  started sneezing yesterday.  Please see the 's chart for documentation regarding same.  Though both the patient and her spouse have MyChart, neither one knows how to use MyChart.  As such, I printed out the quarantine instructions for the spouse to take home and provided my phone number for any questions.

## 2021-10-13 NOTE — HPI: FULL BODY SKIN EXAMINATION
[facility]    Brief Operative Note    Pre-operative diagnosis: Umbilical hernia without obstruction and without gangrene [K42.9]  Post-operative diagnosis Same as pre-operative diagnosis    Procedure: Procedure(s):  HERNIORRHAPHY, UMBILICAL, PEDIATRIC  Surgeon: Surgeon(s) and Role:     * Cyril Guy MD - Primary  Anesthesia: General   Estimated Blood Loss: Minimal    Drains: None  Specimens: * No specimens in log *  Findings:   None.  Complications: none  Implants: * No implants in log *        
How Severe Are Your Spot(S)?: moderate
What Type Of Note Output Would You Prefer (Optional)?: Standard Output
What Is The Reason For Today's Visit?: Full Body Skin Examination
What Is The Reason For Today's Visit? (Being Monitored For X): concerning skin lesions on an annual basis

## 2021-12-03 ENCOUNTER — TELEPHONE (OUTPATIENT)
Dept: MEDICAL GROUP | Facility: PHYSICIAN GROUP | Age: 76
End: 2021-12-03

## 2021-12-03 DIAGNOSIS — R09.81 SINUS CONGESTION: ICD-10-CM

## 2021-12-03 DIAGNOSIS — J30.2 SEASONAL ALLERGIES: ICD-10-CM

## 2021-12-03 NOTE — TELEPHONE ENCOUNTER
VOICEMAIL  1. Caller Name: Nancie Joyner                        Call Back Number: [phone] (work)      2. Message: Patient continues to have sinus issues and is asking if she can get a referral to ENT    3. Patient approves office to leave a detailed voicemail/MyChart message: yes

## 2021-12-06 ENCOUNTER — TELEPHONE (OUTPATIENT)
Dept: MEDICAL GROUP | Facility: PHYSICIAN GROUP | Age: 76
End: 2021-12-06

## 2021-12-07 ENCOUNTER — OFFICE VISIT (OUTPATIENT)
Dept: MEDICAL GROUP | Facility: PHYSICIAN GROUP | Age: 76
End: 2021-12-07
Payer: MEDICARE

## 2021-12-07 VITALS
HEIGHT: 63 IN | BODY MASS INDEX: 36.5 KG/M2 | OXYGEN SATURATION: 94 % | HEART RATE: 77 BPM | SYSTOLIC BLOOD PRESSURE: 114 MMHG | TEMPERATURE: 97.8 F | WEIGHT: 206 LBS | DIASTOLIC BLOOD PRESSURE: 60 MMHG

## 2021-12-07 DIAGNOSIS — K31.A0 GASTRIC INTESTINAL METAPLASIA: ICD-10-CM

## 2021-12-07 DIAGNOSIS — R09.81 SINUS CONGESTION: ICD-10-CM

## 2021-12-07 DIAGNOSIS — J30.2 SEASONAL ALLERGIES: ICD-10-CM

## 2021-12-07 PROCEDURE — 99214 OFFICE O/P EST MOD 30 MIN: CPT | Performed by: NURSE PRACTITIONER

## 2021-12-07 RX ORDER — OMEPRAZOLE 20 MG/1
20 CAPSULE, DELAYED RELEASE ORAL
Qty: 90 CAPSULE | Refills: 3 | Status: SHIPPED | OUTPATIENT
Start: 2021-12-07 | End: 2022-11-21

## 2021-12-07 RX ORDER — FLUTICASONE PROPIONATE 50 MCG
1 SPRAY, SUSPENSION (ML) NASAL DAILY
Qty: 54.6 ML | Refills: 3 | Status: SHIPPED | OUTPATIENT
Start: 2021-12-07 | End: 2022-08-01

## 2021-12-07 RX ORDER — OMEPRAZOLE 20 MG/1
CAPSULE, DELAYED RELEASE ORAL
COMMUNITY
Start: 2021-11-18 | End: 2021-12-07

## 2021-12-07 ASSESSMENT — FIBROSIS 4 INDEX: FIB4 SCORE: 1.86

## 2021-12-07 NOTE — PATIENT INSTRUCTIONS
Food Choices for Gastroesophageal Reflux Disease, Adult  When you have gastroesophageal reflux disease (GERD), the foods you eat and your eating habits are very important. Choosing the right foods can help ease your discomfort. Think about working with a nutrition specialist (dietitian) to help you make good choices.  What are tips for following this plan?    Meals  · Choose healthy foods that are low in fat, such as fruits, vegetables, whole grains, low-fat dairy products, and lean meat, fish, and poultry.  · Eat small meals often instead of 3 large meals a day. Eat your meals slowly, and in a place where you are relaxed. Avoid bending over or lying down until 2-3 hours after eating.  · Avoid eating meals 2-3 hours before bed.  · Avoid drinking a lot of liquid with meals.  · Cook foods using methods other than frying. Bake, grill, or broil food instead.  · Avoid or limit:  ? Chocolate.  ? Peppermint or spearmint.  ? Alcohol.  ? Pepper.  ? Black and decaffeinated coffee.  ? Black and decaffeinated tea.  ? Bubbly (carbonated) soft drinks.  ? Caffeinated energy drinks and soft drinks.  · Limit high-fat foods such as:  ? Fatty meat or fried foods.  ? Whole milk, cream, butter, or ice cream.  ? Nuts and nut butters.  ? Pastries, donuts, and sweets made with butter or shortening.  · Avoid foods that cause symptoms. These foods may be different for everyone. Common foods that cause symptoms include:  ? Tomatoes.  ? Oranges, nicolasa, and limes.  ? Peppers.  ? Spicy food.  ? Onions and garlic.  ? Vinegar.  Lifestyle  · Maintain a healthy weight. Ask your doctor what weight is healthy for you. If you need to lose weight, work with your doctor to do so safely.  · Exercise for at least 30 minutes for 5 or more days each week, or as told by your doctor.  · Wear loose-fitting clothes.  · Do not smoke. If you need help quitting, ask your doctor.  · Sleep with the head of your bed higher than your feet. Use a wedge under the  mattress or blocks under the bed frame to raise the head of the bed.  Summary  · When you have gastroesophageal reflux disease (GERD), food and lifestyle choices are very important in easing your symptoms.  · Eat small meals often instead of 3 large meals a day. Eat your meals slowly, and in a place where you are relaxed.  · Limit high-fat foods such as fatty meat or fried foods.  · Avoid bending over or lying down until 2-3 hours after eating.  · Avoid peppermint and spearmint, caffeine, alcohol, and chocolate.  This information is not intended to replace advice given to you by your health care provider. Make sure you discuss any questions you have with your health care provider.  Document Released: 06/18/2013 Document Revised: 04/09/2020 Document Reviewed: 01/23/2018  Elsevier Patient Education © 2020 [employer]    Food Choices for Gastroesophageal Reflux Disease, Adult  When you have gastroesophageal reflux disease (GERD), the foods you eat and your eating habits are very important. Choosing the right foods can help ease your discomfort. Think about working with a nutrition specialist (dietitian) to help you make good choices.  What are tips for following this plan?    Meals  · Choose healthy foods that are low in fat, such as fruits, vegetables, whole grains, low-fat dairy products, and lean meat, fish, and poultry.  · Eat small meals often instead of 3 large meals a day. Eat your meals slowly, and in a place where you are relaxed. Avoid bending over or lying down until 2-3 hours after eating.  · Avoid eating meals 2-3 hours before bed.  · Avoid drinking a lot of liquid with meals.  · Cook foods using methods other than frying. Bake, grill, or broil food instead.  · Avoid or limit:  ? Chocolate.  ? Peppermint or spearmint.  ? Alcohol.  ? Pepper.  ? Black and decaffeinated coffee.  ? Black and decaffeinated tea.  ? Bubbly (carbonated) soft drinks.  ? Caffeinated energy drinks and soft drinks.  · Limit high-fat  foods such as:  ? Fatty meat or fried foods.  ? Whole milk, cream, butter, or ice cream.  ? Nuts and nut butters.  ? Pastries, donuts, and sweets made with butter or shortening.  · Avoid foods that cause symptoms. These foods may be different for everyone. Common foods that cause symptoms include:  ? Tomatoes.  ? Oranges, nicolasa, and limes.  ? Peppers.  ? Spicy food.  ? Onions and garlic.  ? Vinegar.  Lifestyle  · Maintain a healthy weight. Ask your doctor what weight is healthy for you. If you need to lose weight, work with your doctor to do so safely.  · Exercise for at least 30 minutes for 5 or more days each week, or as told by your doctor.  · Wear loose-fitting clothes.  · Do not smoke. If you need help quitting, ask your doctor.  · Sleep with the head of your bed higher than your feet. Use a wedge under the mattress or blocks under the bed frame to raise the head of the bed.  Summary  · When you have gastroesophageal reflux disease (GERD), food and lifestyle choices are very important in easing your symptoms.  · Eat small meals often instead of 3 large meals a day. Eat your meals slowly, and in a place where you are relaxed.  · Limit high-fat foods such as fatty meat or fried foods.  · Avoid bending over or lying down until 2-3 hours after eating.  · Avoid peppermint and spearmint, caffeine, alcohol, and chocolate.  This information is not intended to replace advice given to you by your health care provider. Make sure you discuss any questions you have with your health care provider.  Document Released: 06/18/2013 Document Revised: 04/09/2020 Document Reviewed: 01/23/2018  Elsevier Patient Education © 2020 [employer]    Gastroesophageal Reflux Disease, Adult  Gastroesophageal reflux (CARLITOS) happens when acid from the stomach flows up into the tube that connects the mouth and the stomach (esophagus). Normally, food travels down the esophagus and stays in the stomach to be digested. With CARLITOS, food and stomach  acid sometimes move back up into the esophagus. You may have a disease called gastroesophageal reflux disease (GERD) if the reflux:  · Happens often.  · Causes frequent or very bad symptoms.  · Causes problems such as damage to the esophagus.  When this happens, the esophagus becomes sore and swollen (inflamed). Over time, GERD can make small holes (ulcers) in the lining of the esophagus.  What are the causes?  This condition is caused by a problem with the muscle between the esophagus and the stomach. When this muscle is weak or not normal, it does not close properly to keep food and acid from coming back up from the stomach. The muscle can be weak because of:  · Tobacco use.  · Pregnancy.  · Having a certain type of hernia (hiatal hernia).  · Alcohol use.  · Certain foods and drinks, such as coffee, chocolate, onions, and peppermint.  What increases the risk?  You are more likely to develop this condition if you:  · Are overweight.  · Have a disease that affects your connective tissue.  · Use NSAID medicines.  What are the signs or symptoms?  Symptoms of this condition include:  · Heartburn.  · Difficult or painful swallowing.  · The feeling of having a lump in the throat.  · A bitter taste in the mouth.  · Bad breath.  · Having a lot of saliva.  · Having an upset or bloated stomach.  · Belching.  · Chest pain. Different conditions can cause chest pain. Make sure you see your doctor if you have chest pain.  · Shortness of breath or noisy breathing (wheezing).  · Ongoing (chronic) cough or a cough at night.  · Wearing away of the surface of teeth (tooth enamel).  · Weight loss.  How is this treated?  Treatment will depend on how bad your symptoms are. Your doctor may suggest:  · Changes to your diet.  · Medicine.  · Surgery.  Follow these instructions at home:  Eating and drinking    · Follow a diet as told by your doctor. You may need to avoid foods and drinks such as:  ? Coffee and tea (with or without  caffeine).  ? Drinks that contain alcohol.  ? Energy drinks and sports drinks.  ? Bubbly (carbonated) drinks or sodas.  ? Chocolate and cocoa.  ? Peppermint and mint flavorings.  ? Garlic and onions.  ? Horseradish.  ? Spicy and acidic foods. These include peppers, chili powder, jean-baptiste powder, vinegar, hot sauces, and BBQ sauce.  ? Citrus fruit juices and citrus fruits, such as oranges, nicolasa, and limes.  ? Tomato-based foods. These include red sauce, chili, salsa, and pizza with red sauce.  ? Fried and fatty foods. These include donuts, french fries, potato chips, and high-fat dressings.  ? High-fat meats. These include hot dogs, rib eye steak, sausage, ham, and olsen.  ? High-fat dairy items, such as whole milk, butter, and cream cheese.  · Eat small meals often. Avoid eating large meals.  · Avoid drinking large amounts of liquid with your meals.  · Avoid eating meals during the 2-3 hours before bedtime.  · Avoid lying down right after you eat.  · Do not exercise right after you eat.  Lifestyle    · Do not use any products that contain nicotine or tobacco. These include cigarettes, e-cigarettes, and chewing tobacco. If you need help quitting, ask your doctor.  · Try to lower your stress. If you need help doing this, ask your doctor.  · If you are overweight, lose an amount of weight that is healthy for you. Ask your doctor about a safe weight loss goal.  General instructions  · Pay attention to any changes in your symptoms.  · Take over-the-counter and prescription medicines only as told by your doctor. Do not take aspirin, ibuprofen, or other NSAIDs unless your doctor says it is okay.  · Wear loose clothes. Do not wear anything tight around your waist.  · Raise (elevate) the head of your bed about 6 inches (15 cm).  · Avoid bending over if this makes your symptoms worse.  · Keep all follow-up visits as told by your doctor. This is important.  Contact a doctor if:  · You have new symptoms.  · You lose weight and  you do not know why.  · You have trouble swallowing or it hurts to swallow.  · You have wheezing or a cough that keeps happening.  · Your symptoms do not get better with treatment.  · You have a hoarse voice.  Get help right away if:  · You have pain in your arms, neck, jaw, teeth, or back.  · You feel sweaty, dizzy, or light-headed.  · You have chest pain or shortness of breath.  · You throw up (vomit) and your throw-up looks like blood or coffee grounds.  · You pass out (faint).  · Your poop (stool) is bloody or black.  · You cannot swallow, drink, or eat.  Summary  · If a person has gastroesophageal reflux disease (GERD), food and stomach acid move back up into the esophagus and cause symptoms or problems such as damage to the esophagus.  · Treatment will depend on how bad your symptoms are.  · Follow a diet as told by your doctor.  · Take all medicines only as told by your doctor.  This information is not intended to replace advice given to you by your health care provider. Make sure you discuss any questions you have with your health care provider.  Document Released: 06/05/2009 Document Revised: 06/26/2019 Document Reviewed: 06/26/2019  [employer] Patient Education © 2020 [employer]    Gastroesophageal Reflux Disease, Adult  Gastroesophageal reflux (CARLITOS) happens when acid from the stomach flows up into the tube that connects the mouth and the stomach (esophagus). Normally, food travels down the esophagus and stays in the stomach to be digested. However, when a person has CARLITOS, food and stomach acid sometimes move back up into the esophagus. If this becomes a more serious problem, the person may be diagnosed with a disease called gastroesophageal reflux disease (GERD). GERD occurs when the reflux:  · Happens often.  · Causes frequent or severe symptoms.  · Causes problems such as damage to the esophagus.  When stomach acid comes in contact with the esophagus, the acid may cause soreness (inflammation) in the  esophagus. Over time, GERD may create small holes (ulcers) in the lining of the esophagus.  What are the causes?  This condition is caused by a problem with the muscle between the esophagus and the stomach (lower esophageal sphincter, or LES). Normally, the LES muscle closes after food passes through the esophagus to the stomach. When the LES is weakened or abnormal, it does not close properly, and that allows food and stomach acid to go back up into the esophagus.  The LES can be weakened by certain dietary substances, medicines, and medical conditions, including:  · Tobacco use.  · Pregnancy.  · Having a hiatal hernia.  · Alcohol use.  · Certain foods and beverages, such as coffee, chocolate, onions, and peppermint.  What increases the risk?  You are more likely to develop this condition if you:  · Have an increased body weight.  · Have a connective tissue disorder.  · Use NSAID medicines.  What are the signs or symptoms?  Symptoms of this condition include:  · Heartburn.  · Difficult or painful swallowing.  · The feeling of having a lump in the throat.  · A bitter taste in the mouth.  · Bad breath.  · Having a large amount of saliva.  · Having an upset or bloated stomach.  · Belching.  · Chest pain. Different conditions can cause chest pain. Make sure you see your health care provider if you experience chest pain.  · Shortness of breath or wheezing.  · Ongoing (chronic) cough or a night-time cough.  · Wearing away of tooth enamel.  · Weight loss.  How is this diagnosed?  Your health care provider will take a medical history and perform a physical exam. To determine if you have mild or severe GERD, your health care provider may also monitor how you respond to treatment. You may also have tests, including:  · A test to examine your stomach and esophagus with a small camera (endoscopy).  · A test that measures the acidity level in your esophagus.  · A test that measures how much pressure is on your esophagus.  · A  barium swallow or modified barium swallow test to show the shape, size, and functioning of your esophagus.  How is this treated?  The goal of treatment is to help relieve your symptoms and to prevent complications. Treatment for this condition may vary depending on how severe your symptoms are. Your health care provider may recommend:  · Changes to your diet.  · Medicine.  · Surgery.  Follow these instructions at home:  Eating and drinking    · Follow a diet as recommended by your health care provider. This may involve avoiding foods and drinks such as:  ? Coffee and tea (with or without caffeine).  ? Drinks that contain alcohol.  ? Energy drinks and sports drinks.  ? Carbonated drinks or sodas.  ? Chocolate and cocoa.  ? Peppermint and mint flavorings.  ? Garlic and onions.  ? Horseradish.  ? Spicy and acidic foods, including peppers, chili powder, jean-baptiste powder, vinegar, hot sauces, and barbecue sauce.  ? Citrus fruit juices and citrus fruits, such as oranges, nicolasa, and limes.  ? Tomato-based foods, such as red sauce, chili, salsa, and pizza with red sauce.  ? Fried and fatty foods, such as donuts, french fries, potato chips, and high-fat dressings.  ? High-fat meats, such as hot dogs and fatty cuts of red and white meats, such as rib eye steak, sausage, ham, and olsen.  ? High-fat dairy items, such as whole milk, butter, and cream cheese.  · Eat small, frequent meals instead of large meals.  · Avoid drinking large amounts of liquid with your meals.  · Avoid eating meals during the 2-3 hours before bedtime.  · Avoid lying down right after you eat.  · Do not exercise right after you eat.  Lifestyle    · Do not use any products that contain nicotine or tobacco, such as cigarettes, e-cigarettes, and chewing tobacco. If you need help quitting, ask your health care provider.  · Try to reduce your stress by using methods such as yoga or meditation. If you need help reducing stress, ask your health care provider.  · If  you are overweight, reduce your weight to an amount that is healthy for you. Ask your health care provider for guidance about a safe weight loss goal.  General instructions  · Pay attention to any changes in your symptoms.  · Take over-the-counter and prescription medicines only as told by your health care provider. Do not take aspirin, ibuprofen, or other NSAIDs unless your health care provider told you to do so.  · Wear loose-fitting clothing. Do not wear anything tight around your waist that causes pressure on your abdomen.  · Raise (elevate) the head of your bed about 6 inches (15 cm).  · Avoid bending over if this makes your symptoms worse.  · Keep all follow-up visits as told by your health care provider. This is important.  Contact a health care provider if:  · You have:  ? New symptoms.  ? Unexplained weight loss.  ? Difficulty swallowing or it hurts to swallow.  ? Wheezing or a persistent cough.  ? A hoarse voice.  · Your symptoms do not improve with treatment.  Get help right away if you:  · Have pain in your arms, neck, jaw, teeth, or back.  · Feel sweaty, dizzy, or light-headed.  · Have chest pain or shortness of breath.  · Vomit and your vomit looks like blood or coffee grounds.  · Faint.  · Have stool that is bloody or black.  · Cannot swallow, drink, or eat.  Summary  · Gastroesophageal reflux happens when acid from the stomach flows up into the esophagus. GERD is a disease in which the reflux happens often, causes frequent or severe symptoms, or causes problems such as damage to the esophagus.  · Treatment for this condition may vary depending on how severe your symptoms are. Your health care provider may recommend diet and lifestyle changes, medicine, or surgery.  · Contact a health care provider if you have new or worsening symptoms.  · Take over-the-counter and prescription medicines only as told by your health care provider. Do not take aspirin, ibuprofen, or other NSAIDs unless your health care  provider told you to do so.  · Keep all follow-up visits as told by your health care provider. This is important.  This information is not intended to replace advice given to you by your health care provider. Make sure you discuss any questions you have with your health care provider.  Document Released: 09/27/2006 Document Revised: 06/26/2019 Document Reviewed: 06/26/2019  [employer]vier Patient Education © 2020 [employer]

## 2021-12-07 NOTE — TELEPHONE ENCOUNTER
I called and left a message for the patient that on 12/3/2021 a referral had been submitted for ENT for her sinus congestion.  I told the patient that if she did not hear on the referral in 5-7 business days to let us know.

## 2021-12-07 NOTE — ASSESSMENT & PLAN NOTE
Chronic, ongoing. Patient continues to use Flonase nasal spray as needed, but has been experiencing worsening sinus congestion at night and phlegm production in the morning since October 2021. Reports that her nose will clog while she is sleeping. Phlegm in the back of her throat is worse when lying flat. States that she will wake up around 2 or 3 in the morning with her nose blocked and she will be unable to return to sleep. She will need to go to her lounge chair to sleep in an elevated position. She does continue cetirizine 10 mg daily. Reports a history of frequent sinus infections while living in [place] as well as severe allergies. She was following with allergy specialist and received steroid injections, but was advised not to continue long-term due to effects on bone density. Reports that allergies and sinus congestion had improved once she moved to [place]. Patient is wondering if a referral to ENT is appropriate.

## 2021-12-08 NOTE — PROGRESS NOTES
CC:   Chief Complaint   Patient presents with   • Nasal Congestion     at night x months     HISTORY OF THE PRESENT ILLNESS: Patient is a 76 y.o. female. This pleasant patient is here today to discuss issues listed below.    Health Maintenance: Reviewed    Gastric intestinal metaplasia  New to examiner. Patient continues to follow with gastroenterology at [facility]. Patient had EGD and colonoscopy completed in November 2021. EGD shows intestinal metaplasia type I and it is recommended for patient to take lifelong omeprazole 20 mg/day and repeat EGD in 3 years. Requesting medication refill.    Seasonal allergies  Chronic, ongoing. Patient continues to use Flonase nasal spray as needed, but has been experiencing worsening sinus congestion at night and phlegm production in the morning since October 2021. Reports that her nose will clog while she is sleeping. Phlegm in the back of her throat is worse when lying flat. States that she will wake up around 2 or 3 in the morning with her nose blocked and she will be unable to return to sleep. She will need to go to her lounge chair to sleep in an elevated position. She does continue cetirizine 10 mg daily. Reports a history of frequent sinus infections while living in [place] as well as severe allergies. She was following with allergy specialist and received steroid injections, but was advised not to continue long-term due to effects on bone density. Reports that allergies and sinus congestion had improved once she moved to [place]. Patient is wondering if a referral to ENT is appropriate.    Sinus congestion  Chronic, ongoing. Patient continues to use Flonase nasal spray as needed, but has been experiencing worsening sinus congestion at night and phlegm production in the morning since October 2021. Reports that her nose will clog while she is sleeping. Phlegm in the back of her throat is worse when lying flat. States that she will wake up around 2 or 3  in the morning with her nose blocked and she will be unable to return to sleep. She will need to go to her lounge chair to sleep in an elevated position. She does continue cetirizine 10 mg daily. Reports a history of frequent sinus infections while living in [place] as well as severe allergies. She was following with allergy specialist and received steroid injections, but was advised not to continue long-term due to effects on bone density. Reports that allergies and sinus congestion had improved once she moved to [place]. Patient is wondering if a referral to ENT is appropriate.    Allergies: Patient has no known allergies.  Current Outpatient Medications Ordered in Epic   Medication Sig Dispense Refill   • omeprazole (PRILOSEC) 20 MG delayed-release capsule Take 1 Capsule by mouth every morning before breakfast. 90 Capsule 3   • fluticasone (FLONASE) 50 MCG/ACT nasal spray Administer 1 Spray into affected nostril(S) every day. 54.6 mL 3   • polyethylene glycol-electrolytes (NULYTELY) 420 GM solution      • cetirizine (ZYRTEC) 10 MG Tab Take 1 Tablet by mouth every day. 90 Tablet 3   • phenytoin ER (DILANTIN) 100 MG Cap TAKE 5 CAPSULES NIGHTLY 450 Capsule 0   • naproxen (NAPROSYN) 500 MG Tab      • Calcium-Magnesium-Vitamin D (CALCIUM 500 PO) Take  by mouth.     • Multiple Vitamin (MULTI-VITAMINS PO) Take  by mouth.     • Cholecalciferol (VITAMIN D PO) Take  by mouth.     • Cyanocobalamin (VITAMIN B12 PO) Take  by mouth.       No current Epic-ordered facility-administered medications on file.     Past Medical History:   Diagnosis Date   • Basal cell carcinoma 2015    left eyelid   • CKD (chronic kidney disease) stage 3, GFR 30-59 ml/min ([facility]) 8/1/2016   • Epilepsy ([facility]) 8/1/2016   • Osteopenia 8/1/2016   • Seasonal allergies 8/1/2016   • Seizure disorder ([facility])    • UI (urinary incontinence) 8/1/2016   • Varicose veins 8/1/2016   • Vitamin B12 deficiency 8/1/2016   • Vitamin D deficiency 8/1/2016     Past Surgical  "History:   Procedure Laterality Date   • CHOLECYSTECTOMY     • TUBAL COAGULATION LAPAROSCOPIC BILATERAL     • ABDOMINAL HYSTERECTOMY TOTAL     • VEIN STRIPPING       Social History     Tobacco Use   • Smoking status: Former Smoker     Packs/day: 0.50     Years: 2.00     Pack years: 1.00     Types: Cigarettes     Start date: 1977     Quit date: 3/28/1979     Years since quittin.7   • Smokeless tobacco: Never Used   Vaping Use   • Vaping Use: Never used   Substance Use Topics   • Alcohol use: No     Alcohol/week: 0.0 oz   • Drug use: No     Social History     Social History Narrative   • Not on file     Family History   Problem Relation Age of Onset   • Cancer Sister         sinus   • Alzheimer's Disease Sister    • Lung Cancer Father    • Cancer Mother         STOMACH   • Heart Disease Sister    • Cancer Sister    • Heart Disease Sister    • Cancer Brother         pancreatic cancer   • Seizures Paternal Aunt    • No Known Problems Maternal Grandmother    • No Known Problems Maternal Grandfather    • No Known Problems Paternal Grandmother    • No Known Problems Paternal Grandfather    • Breast Cancer Sister      ROS:   Constitutional: No fevers, chills, malaise/fatigue.  Eyes: No eye pain.  ENT: + Sinus congestion and postnasal drainage, worse at night. No sore throat.   Resp: No cough, shortness of breath.  CV: No chest pain, leg swelling, palpitations.  GI: No nausea/vomiting, abdominal pain, constipation, diarrhea.  : No dysuria, hematuria.  MSK: No weakness.  Skin: No rashes.  Neuro: No dizziness, weakness, headaches.  Psych: No suicidal ideations.    All remaining systems reviewed and found to be negative, except as stated above.        Exam: /60 (BP Location: Left arm, Patient Position: Sitting, BP Cuff Size: Adult)   Pulse 77   Temp 36.6 °C (97.8 °F) (Temporal)   Ht 1.6 m (5' 3\")   Wt 93.4 kg (206 lb)   SpO2 94%  Body mass index is 36.49 kg/m².    General: Normal appearing. No " distress.  HEENT: Postnasal drainage. Normocephalic. Eyes conjunctiva clear lids without ptosis, pupils equal and reactive to light accommodation, ears normal shape and contour, canals are clear bilaterally, tympanic membranes are benign, nasal mucosa benign, oropharynx is without erythema, edema or exudates. Sinuses (frontal and maxillary) nontender to palpation.  Neck: Supple without JVD or bruit. Thyroid is not enlarged.  Pulmonary: Clear to ausculation.  Normal effort. No rales, rhonchi, or wheezing.  Cardiovascular: Regular rate and rhythm without murmur. Carotid and radial pulses are intact and equal bilaterally.  Abdomen: Soft, nontender, nondistended. Normal bowel sounds.  Neurologic: Grossly nonfocal.  Lymph: No cervical or supraclavicular lymph nodes are palpable.  Skin: Warm and dry.  No obvious lesions.  Musculoskeletal: Normal gait. No extremity cyanosis, clubbing, or edema.  Psych: Normal mood and affect. Alert and oriented x3. Judgment and insight is normal.     Assessment/Plan:  1. Gastric intestinal metaplasia  New to examiner, recently diagnosed after EGD. Continue to follow with gastroenterology. Due for repeat EGD in November 2024. Continue omeprazole 20 mg daily, refill sent to pharmacy.  Education handout provided regarding foods to avoid to decrease acid reflux.  - omeprazole (PRILOSEC) 20 MG delayed-release capsule; Take 1 Capsule by mouth every morning before breakfast.  Dispense: 90 Capsule; Refill: 3    2. Seasonal allergies  3. Sinus congestion  Chronic, ongoing. Continue fluticasone nasal spray daily, refill sent to pharmacy. Continue cetirizine 10 mg daily. Recommend over-the-counter nasal saline spray 3 times daily and as needed. Recommend bed wedge to elevate head of bed while sleeping. Referral to allergy specialist. Follow-up in 1 month to review symptoms, if not improved may consider referral to ENT at that time.  - fluticasone (FLONASE) 50 MCG/ACT nasal spray; Administer 1 Spray  into affected nostril(S) every day.  Dispense: 54.6 mL; Refill: 3  - Referral to Allergy    Educated in proper administration of medication(s) ordered today including safety, possible SE, risks, benefits, rationale and alternatives to therapy.   Supportive care, differential diagnoses, and indications for immediate follow-up discussed with patient.    Pathogenesis of diagnosis discussed including typical length and natural progression.    Instructed to return to clinic or nearest emergency department for any change in condition, further concerns, or worsening of symptoms.  Patient states understanding of the plan of care and discharge instructions.    Consent for records release signed to order medical records from [facility] for colonoscopy results.    Return in about 1 month (around 1/7/2022) for Allergy, sinus congestion follow up.    Please note that this dictation was created using voice recognition software. I have made every reasonable attempt to correct obvious errors, but I expect that there are errors of grammar and possibly content that I did not discover before finalizing the note.

## 2022-02-14 PROBLEM — M17.11 OSTEOARTHRITIS OF RIGHT KNEE: Status: ACTIVE | Noted: 2022-02-14

## 2022-02-16 ASSESSMENT — FIBROSIS 4 INDEX: FIB4 SCORE: 1.86

## 2022-03-02 ASSESSMENT — FIBROSIS 4 INDEX: FIB4 SCORE: 1.86

## 2022-03-03 ENCOUNTER — OFFICE VISIT (OUTPATIENT)
Dept: MEDICAL GROUP | Facility: PHYSICIAN GROUP | Age: 77
End: 2022-03-03
Payer: MEDICARE

## 2022-03-03 VITALS
OXYGEN SATURATION: 94 % | HEIGHT: 64 IN | TEMPERATURE: 97.5 F | DIASTOLIC BLOOD PRESSURE: 70 MMHG | WEIGHT: 193 LBS | SYSTOLIC BLOOD PRESSURE: 118 MMHG | HEART RATE: 84 BPM | BODY MASS INDEX: 32.95 KG/M2

## 2022-03-03 DIAGNOSIS — Z91.81 RISK FOR FALLS: ICD-10-CM

## 2022-03-03 DIAGNOSIS — Z01.818 PREOP EXAMINATION: ICD-10-CM

## 2022-03-03 DIAGNOSIS — M17.11 PRIMARY OSTEOARTHRITIS OF RIGHT KNEE: ICD-10-CM

## 2022-03-03 PROCEDURE — 99213 OFFICE O/P EST LOW 20 MIN: CPT | Performed by: NURSE PRACTITIONER

## 2022-03-03 RX ORDER — AZELASTINE HYDROCHLORIDE 137 UG/1
SPRAY, METERED NASAL PRN
COMMUNITY
Start: 2022-01-04

## 2022-03-03 ASSESSMENT — FIBROSIS 4 INDEX: FIB4 SCORE: 1.86

## 2022-03-03 ASSESSMENT — PATIENT HEALTH QUESTIONNAIRE - PHQ9: CLINICAL INTERPRETATION OF PHQ2 SCORE: 0

## 2022-03-03 NOTE — PROGRESS NOTES
Pre-op Consultation    REASON FOR VISIT: Pre-Op Consultation  Consultation Requested by: Dr. Lowe [facility]  Procedure date and type: TBD - Total right knee replacement    History of condition for which surgery is planned:     Osteoarthritis of right knee  Presents today for pre-op evaluation prior to surgery with Dr. Lowe at [facility] for a total RIGHT knee replacement. Time and day to be determined. She is scheduled for pre-op at [facility] tomorrow.     Current chronic conditions:   Patient Active Problem List    Diagnosis Date Noted   • Osteoarthritis of right knee 02/14/2022   • Gastric intestinal metaplasia 12/07/2021   • Sinus congestion 12/07/2021   • Cough 10/12/2021   • Other constipation 07/15/2021   • Risk for falls 07/27/2020   • Chronic foot pain, right 07/27/2020   • Changing nevus 03/28/2019   • Chronic bilateral low back pain without sciatica 03/28/2019   • Dyslipidemia 03/28/2019   • Class 2 obesity due to excess calories with body mass index (BMI) of 38.0 to 38.9 in adult 03/28/2019   • Chronic pain of both knees 10/10/2017   • Osteopenia of spine 08/01/2016   • Vitamin B12 deficiency 08/01/2016   • Varicose veins of both lower extremities with pain 08/01/2016   • Seasonal allergies 08/01/2016   • UI (urinary incontinence) 08/01/2016   • Vitamin D deficiency 08/01/2016   • Epilepsy ([facility]) 08/01/2016     Osteoarthritis of right knee  Presents today for pre-op evaluation prior to surgery with Dr. Lowe at [facility] for a total RIGHT knee replacement. Time and day to be determined. She is scheduled for pre-op at [facility] tomorrow.    Past medical history:  has a past medical history of Basal cell carcinoma (2015), CKD (chronic kidney disease) stage 3, GFR 30-59 ml/min ([facility]) (8/1/2016), Epilepsy ([facility]) (8/1/2016), Osteopenia (8/1/2016), Seasonal allergies (8/1/2016), Seizure disorder ([facility]), UI (urinary incontinence) (8/1/2016), Varicose veins (8/1/2016), Vitamin B12  deficiency (2016), and Vitamin D deficiency (2016).     Negative for: CAD, SBE, CVA, TIA, DVT, PE.  Positive for: blood transfusion after delivery about 50 years ago.    Surgical and anesthetic history:  has a past surgical history that includes vein stripping; abdominal hysterectomy total; cholecystectomy (); and tubal coagulation laparoscopic bilateral ().     Prior surgery without complication, reaction to anesthetic, prolonged recovery.  Positive for: blood transfusion after delivery about 50 years ago.    Family History:   Family History   Problem Relation Age of Onset   • Cancer Sister         sinus   • Alzheimer's Disease Sister    • Lung Cancer Father    • Cancer Mother         STOMACH   • Heart Disease Sister    • Cancer Sister    • Heart Disease Sister    • Cancer Brother         pancreatic cancer   • Seizures Paternal Aunt    • No Known Problems Maternal Grandmother    • No Known Problems Maternal Grandfather    • No Known Problems Paternal Grandmother    • No Known Problems Paternal Grandfather    • Breast Cancer Sister      Habits:   Social History     Tobacco Use   • Smoking status: Former Smoker     Packs/day: 0.50     Years: 2.00     Pack years: 1.00     Types: Cigarettes     Start date: 1977     Quit date: 3/28/1979     Years since quittin.9   • Smokeless tobacco: Never Used   Vaping Use   • Vaping Use: Never used   Substance Use Topics   • Alcohol use: No     Alcohol/week: 0.0 oz   • Drug use: No     Allergies: Patient has no known allergies. No known allergy to anesthetic, iodine, or latex.    Current medicines:   Current Outpatient Medications   Medication Sig Dispense Refill   • Azelastine HCl 137 MCG/SPRAY Solution      • phenytoin ER (DILANTIN) 100 MG Cap TAKE 5 CAPSULES NIGHTLY 450 Capsule 0   • omeprazole (PRILOSEC) 20 MG delayed-release capsule Take 1 Capsule by mouth every morning before breakfast. 90 Capsule 3   • fluticasone (FLONASE) 50 MCG/ACT nasal spray  Administer 1 Spray into affected nostril(S) every day. 54.6 mL 3   • cetirizine (ZYRTEC) 10 MG Tab Take 1 Tablet by mouth every day. 90 Tablet 3   • naproxen (NAPROSYN) 500 MG Tab      • Calcium-Magnesium-Vitamin D (CALCIUM 500 PO) Take  by mouth.     • Multiple Vitamin (MULTI-VITAMINS PO) Take  by mouth.     • Cholecalciferol (VITAMIN D PO) Take  by mouth.     • Cyanocobalamin (VITAMIN B12 PO) Take  by mouth.     • polyethylene glycol-electrolytes (NULYTELY) 420 GM solution        No current facility-administered medications for this visit.     Anticoagulant: Does not take ASA.   Does take naproxen as needed, advised her and  to discontinue NSAID's as directed by ortho surgery.     Herbals: Does not take Black Cohash, Echinacea, Garlic, Ginger, Ginseng, Kava, Patrica’s Wort,  Saw Palmetto, Valerian.   Does take Ginkgo Biloba, advised to discuss and discontinue supplements as directed by ortho surgery.              Functional Status: ambulatory, uses assistive device (cane and walker), lives with .    ROS:   Constitutional: Generalized weakness. Negative for fever, chills, unexpected weight change, night sweats, body aches, sleep issues, and fatigue.   HEENT: Negative for headaches, vision changes, hearing changes, ear pain, tinnitus, ear discharge, rhinorrhea, sinus congestion, sneezing, sore throat, and neck pain.    Respiratory: Negative for cough, shortness of breath, sputum production, hemoptysis, chest congestion, dyspnea, wheezing, and crackles.    Cardiovascular: RLE generalized edema, greater than left since vein procedure in the past. Negative for chest pain, palpitations, BOLAND, paroxsymal nocturnal dyspnea, orthopnea.   Gastrointestinal: Improving constipation. Negative for heartburn, nausea, vomiting, abdominal pain, hematochezia, melena, diarrhea, and greasy/foul-smelling stools.   Genitourinary: Negative for dysuria, nocturia, polyuria, hematuria, pyuria, urinary urgency, urinary  "frequency, and urinary incontinence.   Musculoskeletal: Chronic bilateral knee pain, right worse than left. Negative for myalgias, back pain, and joint pain.   Skin: Negative for rash, sores, lumps, itching, cyanotic skin color change.   Neurological: Negative for dizziness, tingling, tremors, focal sensory deficit, focal weakness and headaches.   Endo/Heme/Allergies: Does not bruise/bleed easily. Denies cold/heat intolerance.   Psychiatric/Behavioral: Chronic memory loss. Negative for depression, suicidal/homicidal ideation.     RESPIRATORY: Denies   Snore loudly? No    Tired or sleepy in daytime? yes   Apnea or choking/gasping observed during sleep? No   Hypertension? No   BMI over 35? No   Age over 50? Yes   Male? No   Low risk for ROXANE    Any objection to receiving blood products if needed? no    ADLs:    Mobility: independent, patient is a fall risk, uses cane and walker.   Personal hygiene: independent   Showering/bathing: independent   Toileting: independent   Dressing: independent   Self feeding: independent  IALDs:   Shopping: independent   Cooking: independent, no food insecurity   Medication management: independent   Housework: independent   Laundry: independent   Finances: independent   Driving: independent   Use telephone: independent    Family support:     Decision making capacity: no concerns    PHYSICAL EXAMINATION:  /70 (BP Location: Left arm, Patient Position: Sitting, BP Cuff Size: Adult)   Pulse 84   Temp 36.4 °C (97.5 °F) (Temporal)   Ht 1.626 m (5' 4\")   Wt 87.5 kg (193 lb)   SpO2 94%  Body mass index is 33.13 kg/m².    General: Normal appearing. No distress.  HEENT: Normocephalic. Eyes conjunctiva clear lids without ptosis, pupils equal and reactive to light accommodation, ears normal shape and contour, canals are clear bilaterally, tympanic membranes are benign, nasal mucosa benign, oropharynx is without erythema, edema or exudates. Sinuses (frontal and maxillary) nontender " to palpation.  Neck: Supple without JVD or bruit. Thyroid is not enlarged.  Pulmonary: Clear to ausculation.  Normal effort. No rales, rhonchi, or wheezing.  Cardiovascular: generalized bilateral lower extremity edema right > left, baseline per patient. Regular rate and rhythm without murmur. Carotid and radial pulses are intact and equal bilaterally.  Abdomen: Soft, nontender, nondistended. Normal bowel sounds.  Neurologic: Grossly nonfocal.  Lymph:  No cervical, supraclavicular or axillary lymph nodes are palpable.  Skin: Warm and dry.  No obvious lesions.  Musculoskeletal: Antalgic/unsteady gait, ambulates with cane. No extremity cyanosis, clubbing.  Psych: Normal mood and affect. Alert and oriented x3. Judgment and insight is normal.     IMPRESSION:  1. Preop examination  2. Primary osteoarthritis of right knee  Presents for pre-op evaluation for total right knee replacement with Dr. Lowe at [facility], date and time to be determined. Keep pre-op appointment scheduled 3/4/2022.  Office notes and pre-op evaluation letter to be faxed to Dr. Lowe office at request of patient.     3. Risk for falls  - Patient identified as fall risk.  Appropriate orders and counseling given.     PLAN:  1. Chronic medical conditions: Stable and controlled. Continue current medicines.   2. Avoid drugs that potentiate bleeding as advised by surgeon  3. Discontinue all herbal supplements and NSAID's prior to surgery as recommended by Dr. Lowe.  4. This patient is considered: Low risk for cardiopulmonary complications for this planned surgery.    Please note that this dictation was created using voice recognition software. I have worked with consultants from the vendor as well as technical experts from [facility] to optimize the interface. I have made every reasonable attempt to correct obvious errors, but I expect that there are errors of grammar and possibly content that I did not discover before finalizing the note.

## 2022-03-03 NOTE — ASSESSMENT & PLAN NOTE
Presents today for pre-op evaluation prior to surgery with Dr. Lowe at [facility] for a total RIGHT knee replacement. Time and day to be determined. She is scheduled for pre-op at [facility] tomorrow.

## 2022-03-03 NOTE — LETTER
PROCEDURE/SURGERY CLEARANCE FORM      Encounter Date: 3/3/2022    Patient: Nancie Joyner  YOB: 1945    PCP:  MOUNA Lopez    REFERRING DOCTOR:  Dr. Lowe  [facility]      The above patient is low risk to have the following procedure/surgery:   total right knee replacement                                                          APRN Signature    MOUNA Lopez

## 2022-04-26 ENCOUNTER — PRE-ADMISSION TESTING (OUTPATIENT)
Dept: ADMISSIONS | Facility: MEDICAL CENTER | Age: 77
End: 2022-04-26
Attending: ORTHOPAEDIC SURGERY
Payer: MEDICARE

## 2022-04-26 DIAGNOSIS — Z01.812 PRE-OPERATIVE LABORATORY EXAMINATION: ICD-10-CM

## 2022-04-26 LAB
ANION GAP SERPL CALC-SCNC: 11 MMOL/L (ref 7–16)
BUN SERPL-MCNC: 16 MG/DL (ref 8–22)
CALCIUM SERPL-MCNC: 9.1 MG/DL (ref 8.4–10.2)
CHLORIDE SERPL-SCNC: 104 MMOL/L (ref 96–112)
CO2 SERPL-SCNC: 24 MMOL/L (ref 20–33)
CREAT SERPL-MCNC: 0.87 MG/DL (ref 0.5–1.4)
ERYTHROCYTE [DISTWIDTH] IN BLOOD BY AUTOMATED COUNT: 49.1 FL (ref 35.9–50)
GFR SERPLBLD CREATININE-BSD FMLA CKD-EPI: 68 ML/MIN/1.73 M 2
GLUCOSE SERPL-MCNC: 84 MG/DL (ref 65–99)
HCT VFR BLD AUTO: 41.4 % (ref 37–47)
HGB BLD-MCNC: 13.6 G/DL (ref 12–16)
MCH RBC QN AUTO: 34 PG (ref 27–33)
MCHC RBC AUTO-ENTMCNC: 32.9 G/DL (ref 33.6–35)
MCV RBC AUTO: 103.5 FL (ref 81.4–97.8)
PLATELET # BLD AUTO: 226 K/UL (ref 164–446)
PMV BLD AUTO: 10.2 FL (ref 9–12.9)
POTASSIUM SERPL-SCNC: 4.6 MMOL/L (ref 3.6–5.5)
RBC # BLD AUTO: 4 M/UL (ref 4.2–5.4)
SCCMEC + MECA PNL NOSE NAA+PROBE: NEGATIVE
SCCMEC + MECA PNL NOSE NAA+PROBE: NEGATIVE
SODIUM SERPL-SCNC: 139 MMOL/L (ref 135–145)
WBC # BLD AUTO: 4.4 K/UL (ref 4.8–10.8)

## 2022-04-26 PROCEDURE — 87641 MR-STAPH DNA AMP PROBE: CPT

## 2022-04-26 PROCEDURE — 87640 STAPH A DNA AMP PROBE: CPT | Mod: XU

## 2022-04-26 PROCEDURE — 85027 COMPLETE CBC AUTOMATED: CPT

## 2022-04-26 PROCEDURE — 80048 BASIC METABOLIC PNL TOTAL CA: CPT

## 2022-04-26 PROCEDURE — 36415 COLL VENOUS BLD VENIPUNCTURE: CPT

## 2022-04-26 ASSESSMENT — FIBROSIS 4 INDEX: FIB4 SCORE: 1.86

## 2022-04-26 NOTE — DISCHARGE PLANNING
DISCHARGE PLANNING NOTE - TOTAL JOINT    Procedure: Procedure(s):  Right total knee arthroplasty  Procedure Date: 5/10/2022  Insurance: Payor: MEDICARE / Plan: MEDICARE PART A & B / Product Type: *No Product type* /    Equipment currently available at home?  front-wheel walker, raised toilet seat and shower chair  Steps into the home? 3  Steps within the home? 0  Toilet height? Standard  Type of shower? walk-in shower  Who will be with you during your recovery? Spouse.  Is Outpatient Physical Therapy set up after surgery? Yes  Did you take the Total Joint Class and where? Yes  Planning same day discharge?Yes     This writer met with pt and spouse. Pt states she has all needed equipment. Home safety checklist reviewed and copy given to pt. Pt educated to dc criteria. All questions answered and verbalizes understanding of all instructions. No dc needs identified at this time. Anticipate dc to home without barriers.

## 2022-05-04 NOTE — H&P (VIEW-ONLY)
Patient ID:  Nancie Joyner is a 76 y.o. female.     Chief Complaint:  right knee pain        HPI:     Patient comes in today for recheck of her right knee anticipation of right total knee arthroplasty.  Patient continues with significant right knee pain without significant improvement with nonsurgical treatment.  She has difficulty with daily activities.  Patient is ambulating with a cane due to knee pain     Review of Systems   Musculoskeletal: Positive for joint pain, joint swelling and stiffness.              Past Medical History:   Diagnosis Date   • CKD (chronic kidney disease) stage 3, GFR 30-59 ml/min ([facility]) 8/1/2016   • Osteopenia 8/1/2016   • Vitamin B12 deficiency 8/1/2016   • Varicose veins 8/1/2016   • Seasonal allergies 8/1/2016   • UI (urinary incontinence) 8/1/2016   • Vitamin D deficiency 8/1/2016   • Epilepsy ([facility]) 8/1/2016   • Basal cell carcinoma 2015     left eyelid   • Seizure disorder ([facility])           has a current medication list which includes the following prescription(s): phenytoin er, azelastine hcl, omeprazole, fluticasone, polyethylene glycol-electrolytes, cetirizine, calcium carbonate, multiple vitamin, vitamin d, and cyanocobalamin.            Past Surgical History:   Procedure Laterality Date   • CHOLECYSTECTOMY   1989   • TUBAL COAGULATION LAPAROSCOPIC BILATERAL   1979   • ABDOMINAL HYSTERECTOMY TOTAL       • VEIN STRIPPING                   Objective []Expand by Default     General: Patient awake, alert, oriented, and answers questions appropriately     Ortho Exam:     Previous examination of the knee shows joint line tenderness neurovascularly intact.  Reasonable quad tone.  Patient  has no gross laxity.     Imaging:     Right knee DJD on previous x-ray     Assessment:     Right knee DJD     Plan:     Risk, benefits, alternatives were discussed during treatment options.  Patient is recommended to undergo right total knee arthroplasty.  All intraoperative, perioperative,  and postoperative questions were answered.  We will see the patient back postoperatively.  She will take aspirin for DVT prophylaxis, oxycodone and Tylenol for postoperative pain.  Patient has kidney disease and will not take anti-inflammatories.  All questions were answered.     No follow-ups on file.     Please note that this dictation was created using voice recognition software.  I have made every reasonable attempt to correct obvious errors, but I expect that there are errors of grammar and possibly content that I did not discover before finalizing the note.

## 2022-05-09 ENCOUNTER — ANESTHESIA EVENT (OUTPATIENT)
Dept: SURGERY | Facility: MEDICAL CENTER | Age: 77
End: 2022-05-09
Payer: MEDICARE

## 2022-05-10 ENCOUNTER — HOSPITAL ENCOUNTER (OUTPATIENT)
Facility: MEDICAL CENTER | Age: 77
End: 2022-05-10
Attending: ORTHOPAEDIC SURGERY | Admitting: ORTHOPAEDIC SURGERY
Payer: MEDICARE

## 2022-05-10 ENCOUNTER — ANESTHESIA (OUTPATIENT)
Dept: SURGERY | Facility: MEDICAL CENTER | Age: 77
End: 2022-05-10
Payer: MEDICARE

## 2022-05-10 VITALS
HEIGHT: 63 IN | RESPIRATION RATE: 16 BRPM | TEMPERATURE: 97.8 F | HEART RATE: 66 BPM | DIASTOLIC BLOOD PRESSURE: 59 MMHG | BODY MASS INDEX: 33.91 KG/M2 | WEIGHT: 191.36 LBS | SYSTOLIC BLOOD PRESSURE: 122 MMHG | OXYGEN SATURATION: 96 %

## 2022-05-10 DIAGNOSIS — M17.11 PRIMARY OSTEOARTHRITIS OF RIGHT KNEE: ICD-10-CM

## 2022-05-10 PROBLEM — Z96.651 S/P TOTAL KNEE ARTHROPLASTY, RIGHT: Status: ACTIVE | Noted: 2022-05-10

## 2022-05-10 PROCEDURE — 160009 HCHG ANES TIME/MIN: Performed by: ORTHOPAEDIC SURGERY

## 2022-05-10 PROCEDURE — 160002 HCHG RECOVERY MINUTES (STAT): Performed by: ORTHOPAEDIC SURGERY

## 2022-05-10 PROCEDURE — 160035 HCHG PACU - 1ST 60 MINS PHASE I: Performed by: ORTHOPAEDIC SURGERY

## 2022-05-10 PROCEDURE — G0378 HOSPITAL OBSERVATION PER HR: HCPCS

## 2022-05-10 PROCEDURE — 27447 TOTAL KNEE ARTHROPLASTY: CPT | Mod: RT | Performed by: ORTHOPAEDIC SURGERY

## 2022-05-10 PROCEDURE — 700111 HCHG RX REV CODE 636 W/ 250 OVERRIDE (IP): Performed by: ORTHOPAEDIC SURGERY

## 2022-05-10 PROCEDURE — 76942 ECHO GUIDE FOR BIOPSY: CPT | Mod: 26 | Performed by: ANESTHESIOLOGY

## 2022-05-10 PROCEDURE — 97161 PT EVAL LOW COMPLEX 20 MIN: CPT

## 2022-05-10 PROCEDURE — 160036 HCHG PACU - EA ADDL 30 MINS PHASE I: Performed by: ORTHOPAEDIC SURGERY

## 2022-05-10 PROCEDURE — 97110 THERAPEUTIC EXERCISES: CPT

## 2022-05-10 PROCEDURE — 700111 HCHG RX REV CODE 636 W/ 250 OVERRIDE (IP): Performed by: ANESTHESIOLOGY

## 2022-05-10 PROCEDURE — 700105 HCHG RX REV CODE 258: Performed by: ORTHOPAEDIC SURGERY

## 2022-05-10 PROCEDURE — A9270 NON-COVERED ITEM OR SERVICE: HCPCS | Performed by: ANESTHESIOLOGY

## 2022-05-10 PROCEDURE — 64447 NJX AA&/STRD FEMORAL NRV IMG: CPT | Performed by: ORTHOPAEDIC SURGERY

## 2022-05-10 PROCEDURE — 501838 HCHG SUTURE GENERAL: Performed by: ORTHOPAEDIC SURGERY

## 2022-05-10 PROCEDURE — C1776 JOINT DEVICE (IMPLANTABLE): HCPCS | Performed by: ORTHOPAEDIC SURGERY

## 2022-05-10 PROCEDURE — 502000 HCHG MISC OR IMPLANTS RC 0278: Performed by: ORTHOPAEDIC SURGERY

## 2022-05-10 PROCEDURE — 700101 HCHG RX REV CODE 250: Performed by: ANESTHESIOLOGY

## 2022-05-10 PROCEDURE — 160029 HCHG SURGERY MINUTES - 1ST 30 MINS LEVEL 4: Performed by: ORTHOPAEDIC SURGERY

## 2022-05-10 PROCEDURE — 94760 N-INVAS EAR/PLS OXIMETRY 1: CPT

## 2022-05-10 PROCEDURE — 160048 HCHG OR STATISTICAL LEVEL 1-5: Performed by: ORTHOPAEDIC SURGERY

## 2022-05-10 PROCEDURE — 160041 HCHG SURGERY MINUTES - EA ADDL 1 MIN LEVEL 4: Performed by: ORTHOPAEDIC SURGERY

## 2022-05-10 PROCEDURE — 96365 THER/PROPH/DIAG IV INF INIT: CPT | Mod: XU

## 2022-05-10 PROCEDURE — 700102 HCHG RX REV CODE 250 W/ 637 OVERRIDE(OP): Performed by: ANESTHESIOLOGY

## 2022-05-10 PROCEDURE — 700101 HCHG RX REV CODE 250: Performed by: ORTHOPAEDIC SURGERY

## 2022-05-10 PROCEDURE — 502579 HCHG PACK, TOTAL KNEE: Performed by: ORTHOPAEDIC SURGERY

## 2022-05-10 PROCEDURE — 99100 ANES PT EXTEME AGE<1 YR&>70: CPT | Performed by: ANESTHESIOLOGY

## 2022-05-10 PROCEDURE — 01402 ANES OPN/ARTH TOT KNE ARTHRP: CPT | Performed by: ANESTHESIOLOGY

## 2022-05-10 PROCEDURE — 27447 TOTAL KNEE ARTHROPLASTY: CPT | Mod: ASROC,RT | Performed by: PHYSICIAN ASSISTANT

## 2022-05-10 PROCEDURE — 97165 OT EVAL LOW COMPLEX 30 MIN: CPT

## 2022-05-10 PROCEDURE — 96375 TX/PRO/DX INJ NEW DRUG ADDON: CPT | Mod: XU

## 2022-05-10 PROCEDURE — 502240 HCHG MISC OR SUPPLY RC 0272: Performed by: ORTHOPAEDIC SURGERY

## 2022-05-10 PROCEDURE — 64447 NJX AA&/STRD FEMORAL NRV IMG: CPT | Mod: 59 | Performed by: ANESTHESIOLOGY

## 2022-05-10 DEVICE — IMPLANTABLE DEVICE: Type: IMPLANTABLE DEVICE | Site: KNEE | Status: FUNCTIONAL

## 2022-05-10 RX ORDER — DIPHENHYDRAMINE HYDROCHLORIDE 50 MG/ML
25 INJECTION INTRAMUSCULAR; INTRAVENOUS EVERY 6 HOURS PRN
Status: DISCONTINUED | OUTPATIENT
Start: 2022-05-10 | End: 2022-05-10 | Stop reason: HOSPADM

## 2022-05-10 RX ORDER — AMOXICILLIN 250 MG
1 CAPSULE ORAL NIGHTLY
Status: DISCONTINUED | OUTPATIENT
Start: 2022-05-10 | End: 2022-05-10 | Stop reason: HOSPADM

## 2022-05-10 RX ORDER — OXYCODONE HCL 5 MG/5 ML
10 SOLUTION, ORAL ORAL
Status: COMPLETED | OUTPATIENT
Start: 2022-05-10 | End: 2022-05-10

## 2022-05-10 RX ORDER — BISACODYL 10 MG
10 SUPPOSITORY, RECTAL RECTAL
Status: DISCONTINUED | OUTPATIENT
Start: 2022-05-10 | End: 2022-05-10 | Stop reason: HOSPADM

## 2022-05-10 RX ORDER — CEFAZOLIN SODIUM 1 G/3ML
INJECTION, POWDER, FOR SOLUTION INTRAMUSCULAR; INTRAVENOUS PRN
Status: DISCONTINUED | OUTPATIENT
Start: 2022-05-10 | End: 2022-05-10 | Stop reason: SURG

## 2022-05-10 RX ORDER — AMOXICILLIN 250 MG
1 CAPSULE ORAL
Status: DISCONTINUED | OUTPATIENT
Start: 2022-05-10 | End: 2022-05-10 | Stop reason: HOSPADM

## 2022-05-10 RX ORDER — MORPHINE SULFATE 4 MG/ML
4 INJECTION INTRAVENOUS
Status: DISCONTINUED | OUTPATIENT
Start: 2022-05-10 | End: 2022-05-10 | Stop reason: HOSPADM

## 2022-05-10 RX ORDER — LORAZEPAM 2 MG/ML
0.5 INJECTION INTRAMUSCULAR
Status: DISCONTINUED | OUTPATIENT
Start: 2022-05-10 | End: 2022-05-10 | Stop reason: HOSPADM

## 2022-05-10 RX ORDER — DEXAMETHASONE SODIUM PHOSPHATE 4 MG/ML
INJECTION, SOLUTION INTRA-ARTICULAR; INTRALESIONAL; INTRAMUSCULAR; INTRAVENOUS; SOFT TISSUE PRN
Status: DISCONTINUED | OUTPATIENT
Start: 2022-05-10 | End: 2022-05-10 | Stop reason: SURG

## 2022-05-10 RX ORDER — POLYETHYLENE GLYCOL 3350 17 G/17G
1 POWDER, FOR SOLUTION ORAL 2 TIMES DAILY PRN
Status: DISCONTINUED | OUTPATIENT
Start: 2022-05-10 | End: 2022-05-10 | Stop reason: HOSPADM

## 2022-05-10 RX ORDER — OXYCODONE HYDROCHLORIDE 10 MG/1
10 TABLET ORAL
Status: DISCONTINUED | OUTPATIENT
Start: 2022-05-10 | End: 2022-05-10 | Stop reason: HOSPADM

## 2022-05-10 RX ORDER — HYDROMORPHONE HYDROCHLORIDE 1 MG/ML
0.2 INJECTION, SOLUTION INTRAMUSCULAR; INTRAVENOUS; SUBCUTANEOUS
Status: DISCONTINUED | OUTPATIENT
Start: 2022-05-10 | End: 2022-05-10 | Stop reason: HOSPADM

## 2022-05-10 RX ORDER — MIDAZOLAM HYDROCHLORIDE 1 MG/ML
INJECTION INTRAMUSCULAR; INTRAVENOUS PRN
Status: DISCONTINUED | OUTPATIENT
Start: 2022-05-10 | End: 2022-05-10 | Stop reason: SURG

## 2022-05-10 RX ORDER — IBUPROFEN 400 MG/1
800 TABLET ORAL 3 TIMES DAILY
Status: DISCONTINUED | OUTPATIENT
Start: 2022-05-10 | End: 2022-05-10 | Stop reason: HOSPADM

## 2022-05-10 RX ORDER — ONDANSETRON 2 MG/ML
4 INJECTION INTRAMUSCULAR; INTRAVENOUS
Status: DISCONTINUED | OUTPATIENT
Start: 2022-05-10 | End: 2022-05-10 | Stop reason: HOSPADM

## 2022-05-10 RX ORDER — KETOROLAC TROMETHAMINE 30 MG/ML
INJECTION, SOLUTION INTRAMUSCULAR; INTRAVENOUS PRN
Status: DISCONTINUED | OUTPATIENT
Start: 2022-05-10 | End: 2022-05-10 | Stop reason: SURG

## 2022-05-10 RX ORDER — HYDROMORPHONE HYDROCHLORIDE 1 MG/ML
0.4 INJECTION, SOLUTION INTRAMUSCULAR; INTRAVENOUS; SUBCUTANEOUS
Status: DISCONTINUED | OUTPATIENT
Start: 2022-05-10 | End: 2022-05-10 | Stop reason: HOSPADM

## 2022-05-10 RX ORDER — ENEMA 19; 7 G/133ML; G/133ML
1 ENEMA RECTAL
Status: DISCONTINUED | OUTPATIENT
Start: 2022-05-10 | End: 2022-05-10 | Stop reason: HOSPADM

## 2022-05-10 RX ORDER — ROPIVACAINE HYDROCHLORIDE 5 MG/ML
INJECTION, SOLUTION EPIDURAL; INFILTRATION; PERINEURAL PRN
Status: DISCONTINUED | OUTPATIENT
Start: 2022-05-10 | End: 2022-05-10 | Stop reason: SURG

## 2022-05-10 RX ORDER — DEXAMETHASONE SODIUM PHOSPHATE 4 MG/ML
4 INJECTION, SOLUTION INTRA-ARTICULAR; INTRALESIONAL; INTRAMUSCULAR; INTRAVENOUS; SOFT TISSUE
Status: DISCONTINUED | OUTPATIENT
Start: 2022-05-10 | End: 2022-05-10 | Stop reason: HOSPADM

## 2022-05-10 RX ORDER — HYDROMORPHONE HYDROCHLORIDE 1 MG/ML
0.1 INJECTION, SOLUTION INTRAMUSCULAR; INTRAVENOUS; SUBCUTANEOUS
Status: DISCONTINUED | OUTPATIENT
Start: 2022-05-10 | End: 2022-05-10 | Stop reason: HOSPADM

## 2022-05-10 RX ORDER — DOCUSATE SODIUM 100 MG/1
100 CAPSULE, LIQUID FILLED ORAL 2 TIMES DAILY
Status: DISCONTINUED | OUTPATIENT
Start: 2022-05-10 | End: 2022-05-10 | Stop reason: HOSPADM

## 2022-05-10 RX ORDER — CEFAZOLIN SODIUM 1 G/3ML
2 INJECTION, POWDER, FOR SOLUTION INTRAMUSCULAR; INTRAVENOUS ONCE
Status: DISCONTINUED | OUTPATIENT
Start: 2022-05-10 | End: 2022-05-10 | Stop reason: HOSPADM

## 2022-05-10 RX ORDER — ASPIRIN 325 MG
325 TABLET ORAL 2 TIMES DAILY
Status: DISCONTINUED | OUTPATIENT
Start: 2022-05-11 | End: 2022-05-10 | Stop reason: HOSPADM

## 2022-05-10 RX ORDER — BUPIVACAINE HYDROCHLORIDE AND EPINEPHRINE 5; 5 MG/ML; UG/ML
INJECTION, SOLUTION EPIDURAL; INTRACAUDAL; PERINEURAL
Status: DISCONTINUED | OUTPATIENT
Start: 2022-05-10 | End: 2022-05-10 | Stop reason: HOSPADM

## 2022-05-10 RX ORDER — IBUPROFEN 400 MG/1
800 TABLET ORAL 3 TIMES DAILY PRN
Status: DISCONTINUED | OUTPATIENT
Start: 2022-05-15 | End: 2022-05-10 | Stop reason: HOSPADM

## 2022-05-10 RX ORDER — ONDANSETRON 2 MG/ML
INJECTION INTRAMUSCULAR; INTRAVENOUS PRN
Status: DISCONTINUED | OUTPATIENT
Start: 2022-05-10 | End: 2022-05-10 | Stop reason: SURG

## 2022-05-10 RX ORDER — HALOPERIDOL 5 MG/ML
1 INJECTION INTRAMUSCULAR
Status: DISCONTINUED | OUTPATIENT
Start: 2022-05-10 | End: 2022-05-10 | Stop reason: HOSPADM

## 2022-05-10 RX ORDER — SCOLOPAMINE TRANSDERMAL SYSTEM 1 MG/1
1 PATCH, EXTENDED RELEASE TRANSDERMAL
Status: DISCONTINUED | OUTPATIENT
Start: 2022-05-10 | End: 2022-05-10 | Stop reason: HOSPADM

## 2022-05-10 RX ORDER — ONDANSETRON 2 MG/ML
4 INJECTION INTRAMUSCULAR; INTRAVENOUS EVERY 4 HOURS PRN
Status: DISCONTINUED | OUTPATIENT
Start: 2022-05-10 | End: 2022-05-10 | Stop reason: HOSPADM

## 2022-05-10 RX ORDER — METOPROLOL TARTRATE 1 MG/ML
1 INJECTION, SOLUTION INTRAVENOUS
Status: DISCONTINUED | OUTPATIENT
Start: 2022-05-10 | End: 2022-05-10 | Stop reason: HOSPADM

## 2022-05-10 RX ORDER — HALOPERIDOL 5 MG/ML
1 INJECTION INTRAMUSCULAR EVERY 6 HOURS PRN
Status: DISCONTINUED | OUTPATIENT
Start: 2022-05-10 | End: 2022-05-10 | Stop reason: HOSPADM

## 2022-05-10 RX ORDER — ACETAMINOPHEN 500 MG
1000 TABLET ORAL EVERY 6 HOURS PRN
Status: DISCONTINUED | OUTPATIENT
Start: 2022-05-15 | End: 2022-05-10 | Stop reason: HOSPADM

## 2022-05-10 RX ORDER — ROCURONIUM BROMIDE 10 MG/ML
INJECTION, SOLUTION INTRAVENOUS PRN
Status: DISCONTINUED | OUTPATIENT
Start: 2022-05-10 | End: 2022-05-10 | Stop reason: SURG

## 2022-05-10 RX ORDER — OXYCODONE HYDROCHLORIDE 5 MG/1
5 TABLET ORAL
Status: DISCONTINUED | OUTPATIENT
Start: 2022-05-10 | End: 2022-05-10 | Stop reason: HOSPADM

## 2022-05-10 RX ORDER — TRANEXAMIC ACID 100 MG/ML
INJECTION, SOLUTION INTRAVENOUS PRN
Status: DISCONTINUED | OUTPATIENT
Start: 2022-05-10 | End: 2022-05-10 | Stop reason: SURG

## 2022-05-10 RX ORDER — ACETAMINOPHEN 500 MG
1000 TABLET ORAL EVERY 6 HOURS
Status: DISCONTINUED | OUTPATIENT
Start: 2022-05-10 | End: 2022-05-10 | Stop reason: HOSPADM

## 2022-05-10 RX ORDER — ACETAMINOPHEN 500 MG
1000 TABLET ORAL ONCE
Status: COMPLETED | OUTPATIENT
Start: 2022-05-10 | End: 2022-05-10

## 2022-05-10 RX ORDER — HYDRALAZINE HYDROCHLORIDE 20 MG/ML
5 INJECTION INTRAMUSCULAR; INTRAVENOUS
Status: DISCONTINUED | OUTPATIENT
Start: 2022-05-10 | End: 2022-05-10 | Stop reason: HOSPADM

## 2022-05-10 RX ORDER — LIDOCAINE HYDROCHLORIDE 20 MG/ML
INJECTION, SOLUTION EPIDURAL; INFILTRATION; INTRACAUDAL; PERINEURAL PRN
Status: DISCONTINUED | OUTPATIENT
Start: 2022-05-10 | End: 2022-05-10 | Stop reason: HOSPADM

## 2022-05-10 RX ORDER — SODIUM CHLORIDE, SODIUM LACTATE, POTASSIUM CHLORIDE, CALCIUM CHLORIDE 600; 310; 30; 20 MG/100ML; MG/100ML; MG/100ML; MG/100ML
INJECTION, SOLUTION INTRAVENOUS CONTINUOUS
Status: ACTIVE | OUTPATIENT
Start: 2022-05-10 | End: 2022-05-10

## 2022-05-10 RX ORDER — CEFAZOLIN SODIUM IN 0.9 % NACL 2 G/100 ML
2 PLASTIC BAG, INJECTION (ML) INTRAVENOUS ONCE
Status: COMPLETED | OUTPATIENT
Start: 2022-05-10 | End: 2022-05-10

## 2022-05-10 RX ORDER — OXYCODONE HCL 5 MG/5 ML
5 SOLUTION, ORAL ORAL
Status: COMPLETED | OUTPATIENT
Start: 2022-05-10 | End: 2022-05-10

## 2022-05-10 RX ORDER — CELECOXIB 200 MG/1
200 CAPSULE ORAL ONCE
Status: COMPLETED | OUTPATIENT
Start: 2022-05-10 | End: 2022-05-10

## 2022-05-10 RX ADMIN — TRANEXAMIC ACID 1000 MG: 100 INJECTION, SOLUTION INTRAVENOUS at 09:53

## 2022-05-10 RX ADMIN — ROCURONIUM BROMIDE 50 MG: 10 INJECTION, SOLUTION INTRAVENOUS at 09:47

## 2022-05-10 RX ADMIN — PROPOFOL 100 MG: 10 INJECTION, EMULSION INTRAVENOUS at 09:47

## 2022-05-10 RX ADMIN — FENTANYL CITRATE 100 MCG: 50 INJECTION, SOLUTION INTRAMUSCULAR; INTRAVENOUS at 09:47

## 2022-05-10 RX ADMIN — SODIUM CHLORIDE, POTASSIUM CHLORIDE, SODIUM LACTATE AND CALCIUM CHLORIDE: 600; 310; 30; 20 INJECTION, SOLUTION INTRAVENOUS at 08:58

## 2022-05-10 RX ADMIN — DEXAMETHASONE SODIUM PHOSPHATE 10 MG: 4 INJECTION, SOLUTION INTRAMUSCULAR; INTRAVENOUS at 09:53

## 2022-05-10 RX ADMIN — LIDOCAINE HYDROCHLORIDE 5 ML: 20 INJECTION, SOLUTION EPIDURAL; INFILTRATION; INTRACAUDAL; PERINEURAL at 09:12

## 2022-05-10 RX ADMIN — CEFAZOLIN 2 G: 330 INJECTION, POWDER, FOR SOLUTION INTRAMUSCULAR; INTRAVENOUS at 09:56

## 2022-05-10 RX ADMIN — CELECOXIB 200 MG: 200 CAPSULE ORAL at 08:39

## 2022-05-10 RX ADMIN — ROPIVACAINE HYDROCHLORIDE 20 ML: 5 INJECTION, SOLUTION EPIDURAL; INFILTRATION; PERINEURAL at 09:12

## 2022-05-10 RX ADMIN — TRANEXAMIC ACID 1000 MG: 100 INJECTION, SOLUTION INTRAVENOUS at 10:38

## 2022-05-10 RX ADMIN — SODIUM CHLORIDE, POTASSIUM CHLORIDE, SODIUM LACTATE AND CALCIUM CHLORIDE: 600; 310; 30; 20 INJECTION, SOLUTION INTRAVENOUS at 12:01

## 2022-05-10 RX ADMIN — ONDANSETRON 4 MG: 2 INJECTION INTRAMUSCULAR; INTRAVENOUS at 10:39

## 2022-05-10 RX ADMIN — FENTANYL CITRATE 100 MCG: 50 INJECTION, SOLUTION INTRAMUSCULAR; INTRAVENOUS at 10:07

## 2022-05-10 RX ADMIN — MIDAZOLAM HYDROCHLORIDE 1 MG: 1 INJECTION, SOLUTION INTRAMUSCULAR; INTRAVENOUS at 09:11

## 2022-05-10 RX ADMIN — ACETAMINOPHEN 1000 MG: 500 TABLET, FILM COATED ORAL at 08:38

## 2022-05-10 RX ADMIN — OXYCODONE HYDROCHLORIDE 10 MG: 5 SOLUTION ORAL at 11:21

## 2022-05-10 RX ADMIN — KETOROLAC TROMETHAMINE 30 MG: 30 INJECTION, SOLUTION INTRAMUSCULAR at 10:39

## 2022-05-10 RX ADMIN — FENTANYL CITRATE 50 MCG: 50 INJECTION, SOLUTION INTRAMUSCULAR; INTRAVENOUS at 11:32

## 2022-05-10 RX ADMIN — SUGAMMADEX 200 MG: 100 INJECTION, SOLUTION INTRAVENOUS at 10:55

## 2022-05-10 RX ADMIN — CEFAZOLIN 2 G: 1 INJECTION, POWDER, FOR SOLUTION INTRAVENOUS at 15:05

## 2022-05-10 ASSESSMENT — PAIN SCALES - GENERAL: PAIN_LEVEL: 4

## 2022-05-10 ASSESSMENT — PAIN DESCRIPTION - PAIN TYPE
TYPE: SURGICAL PAIN

## 2022-05-10 ASSESSMENT — LIFESTYLE VARIABLES
HOW MANY TIMES IN THE PAST YEAR HAVE YOU HAD 5 OR MORE DRINKS IN A DAY: 0
ALCOHOL_USE: NO
AVERAGE NUMBER OF DAYS PER WEEK YOU HAVE A DRINK CONTAINING ALCOHOL: 0
HAVE PEOPLE ANNOYED YOU BY CRITICIZING YOUR DRINKING: NO
EVER FELT BAD OR GUILTY ABOUT YOUR DRINKING: NO
ON A TYPICAL DAY WHEN YOU DRINK ALCOHOL HOW MANY DRINKS DO YOU HAVE: 0
HAVE YOU EVER FELT YOU SHOULD CUT DOWN ON YOUR DRINKING: NO
TOTAL SCORE: 0
CONSUMPTION TOTAL: NEGATIVE
TOTAL SCORE: 0
TOTAL SCORE: 0
EVER HAD A DRINK FIRST THING IN THE MORNING TO STEADY YOUR NERVES TO GET RID OF A HANGOVER: NO

## 2022-05-10 ASSESSMENT — COGNITIVE AND FUNCTIONAL STATUS - GENERAL
DAILY ACTIVITIY SCORE: 22
MOVING TO AND FROM BED TO CHAIR: A LITTLE
MOBILITY SCORE: 19
CLIMB 3 TO 5 STEPS WITH RAILING: A LITTLE
DRESSING REGULAR LOWER BODY CLOTHING: A LITTLE
SUGGESTED CMS G CODE MODIFIER MOBILITY: CK
DAILY ACTIVITIY SCORE: 23
SUGGESTED CMS G CODE MODIFIER MOBILITY: CI
CLIMB 3 TO 5 STEPS WITH RAILING: A LITTLE
SUGGESTED CMS G CODE MODIFIER DAILY ACTIVITY: CJ
DRESSING REGULAR LOWER BODY CLOTHING: A LITTLE
WALKING IN HOSPITAL ROOM: A LITTLE
MOBILITY SCORE: 23
SUGGESTED CMS G CODE MODIFIER DAILY ACTIVITY: CI
MOVING FROM LYING ON BACK TO SITTING ON SIDE OF FLAT BED: A LITTLE
HELP NEEDED FOR BATHING: A LITTLE
STANDING UP FROM CHAIR USING ARMS: A LITTLE

## 2022-05-10 ASSESSMENT — PATIENT HEALTH QUESTIONNAIRE - PHQ9
SUM OF ALL RESPONSES TO PHQ9 QUESTIONS 1 AND 2: 0
1. LITTLE INTEREST OR PLEASURE IN DOING THINGS: NOT AT ALL
2. FEELING DOWN, DEPRESSED, IRRITABLE, OR HOPELESS: NOT AT ALL

## 2022-05-10 ASSESSMENT — GAIT ASSESSMENTS
GAIT LEVEL OF ASSIST: SUPERVISED
ASSISTIVE DEVICE: FRONT WHEEL WALKER
DEVIATION: ANTALGIC
DISTANCE (FEET): 150

## 2022-05-10 ASSESSMENT — FIBROSIS 4 INDEX: FIB4 SCORE: 1.77

## 2022-05-10 ASSESSMENT — ACTIVITIES OF DAILY LIVING (ADL): TOILETING: INDEPENDENT

## 2022-05-10 NOTE — LETTER
April 1, 2022    Patient Name: Nancie Joyner  Surgeon Name: Jam Lowe M.D.  [facility] [facility]  Surgery Date: 4/5/2022    The time of your surgery is not final and may change up to and until the day of your surgery. You will be contacted 24-48 hours prior to your surgery date with your check-in and surgery time.    If you will not be at one of the below numbers please call/text the surgery scheduler at [phone]  Preferred Phone: [phone]    BEFORE YOUR SURGERY  Pre Registration and/or Lab Work must be done within and no earlier than 28 days prior to your surgery date. Please call [facility] at [phone] for an appointment as soon as possible.     COVID test required 4-7 days prior to surgery, failure to do so can result in a cancellation.    The following locations offer COVID testing:    Approved facilities for COVID testing, if scheduled at [facility]:  · [facility] from 7:30am-3:30pm [place] [facility] [place]  · [facility] [phone] (Please call for an appointment)  · Your local pharmacy    Not scheduled at [facility] contact the scheduled facility for approved testing facilities.    Pre op Appointment:   Date: 03/30/2022   Time: 9:45 am    Provider: Jam Lowe M.D.   Location: [address] [place] [id number]  Instructions: Bring a list of all medications you are taking including the dosing and frequency.    Please refrain from smoking any substance after midnight prior to surgery. Smoking may interfere with the anesthetic and frequently produces nausea during the recovery period.    Continue taking all lifesaving medications. Including the morning of your surgery with small sip of water.    Please read the MEDICATION INSTRUCTIONS below completely.    DAY OF YOUR SURGERY  Nothing to eat or drink after midnight     Please arrive at the hospital/surgery  center at the check-in time provided.     An adult will need to bring you and take you home after your surgery.     AFTER YOUR SURGERY  Post op Appointment:   Date: 04/18/2022   Time: 10:30 am    With: Jma Lowe M.D.   Location: [facility] [place] [address]    - Therapy- Your first appointment should be 2  week(s) after your surgery. For your convenience we have 4 Physical Therapy locations: [facility], [place], and [facility]. Call our office ASAP to schedule an appointment at [phone] or take the enclosed Therapy Prescription to a facility of your choice.    TIME OFF WORK  FMLA or Disability forms can be faxed directly to: [phone] or you may drop them off at [facility] [place] [address]. Our office charges a $35.00 fee per form. Forms will be completed within 10 business days of drop off and payment received. For the status of your forms you may contact our disability office directly [phone].    MEDICATION INSTRUCTIONS  The following medications should be stopped a minimum of 10 days prior to surgery:  All over the counter, Supplements & Herbal medications    Anorectics: Phentermine (Adipex-P, Lomaira and Suprenza), Phentermine-topiramate (Qsymia), Bupropion-naltrexone (Contrave)    Opiod Partial Agonists/Opioid Antagonists: Buprenorphine (Subocone, Belbuca, Butrans, Probuphine Implant, Sublocade), Naltrexone (ReVia, Vivitrol), Naloxone    Amphetamines: Dextroamphetamine/Amphetamine (Adderall, Mydayis), Methylphenidate Hydrochloride (Concerta, Metadate, Methylin, Ritalin)    The following medications should be stopped 5 days prior to surgery:  Blood Thinners: Any Aspirin, Aspirin products, anti-inflammatories such as ibuprofen and any blood thinners such as Coumadin and Plavix. Please consult your prescribing physician if you are on life saving blood thinners, in regards to when to stop medications prior to surgery.     The following medications should be stopped a  minimum of 3 days prior to surgery:  PDE-5 inhibitors: Sildenafil (Viagra), Tadalafil (Cialis), Vardenafil (Levitra), Avanafil (Stendra)    MAO Inhibitors: Rasagiline (Azilect), Selegiline (Eldepryl, Emsam, Selapar), Isocarboxazid (Marplan), Phenelzine (Nardil)

## 2022-05-10 NOTE — CARE PLAN
The patient is Stable - Low risk of patient condition declining or worsening    Shift Goals  Clinical Goals: patient will be able to void and ambulate 50 feet  Patient Goals: pain mngt    Progress made toward(s) clinical / shift goals:  Patient gets up and went to the bathroom, voided already and ambulated 50 feet with the use of FWW. Still to be seen by PT/OT...    Patient is not progressing towards the following goals:    Problem: Pain - Standard  Goal: Alleviation of pain or a reduction in pain to the patient’s comfort goal  Outcome: Progressing     Problem: Pain - Standard  Goal: Alleviation of pain or a reduction in pain to the patient’s comfort goal  Outcome: Progressing     Problem: Post-Operative Knee Replacement  Goal: Patient will demonstrate understanding of knee replacement post-surgical weight bearing restrictions and DME usage during hospital stay and post discharge  Outcome: Progressing     Problem: Post-Operative Knee Replacement  Goal: Patient's neurovascular status will be maintained or improve  Outcome: Progressing     Problem: Post-Operative Knee Replacement  Goal: Early mobilization post surgery  Outcome: Progressing     Problem: Post-Operative Knee Replacement  Goal: Proper fit of orthotic device will be assessed and maintained  Outcome: Progressing     Problem: Pain - Post Surgery  Goal: Alleviation or reduction of pain post surgery  Outcome: Progressing     Problem: Incision Care  Goal: Optimal post surgical incision care  Outcome: Progressing     Problem: Respiratory/Oxygenation Function Post-Surgical  Goal: Patient will achieve/maintain normal respiratory rate/effort  Outcome: Progressing     Problem: Bowel Elimination  Goal: Establish and maintain regular bowel function  Outcome: Progressing     Problem: Venous Thromboembolism (VTE) Prevention  Goal: The patient will remain free from venous thromboembolism (VTE)  Outcome: Progressing

## 2022-05-10 NOTE — OR NURSING
1100 PT RECEIVED IN PACU, REPORT RECEIVED.  VSS, PT NON RESPONSIVE.  RESP SPONT, EVEN, NON LABORED.    1110 REPORT GIVEN TO CHRISTIAN GIRARD

## 2022-05-10 NOTE — THERAPY
Physical Therapy   Initial Evaluation     Patient Name: Nancie Joyner  Age:  76 y.o., Sex:  female  Medical Record #: [id number]  Today's Date: 5/10/2022     Precautions  Precautions: No Weight Bearing Restrictions Right Lower Extremity    Assessment  Patient is 76 y.o. female who was seen s/p R TKA. Pt was agreeable to therapy and was able to demonstrate safe upright mobility with FWW use. Pt was able to demonstrate SPV during flat level ambulation and during stair navigation. Pt was provided with education regarding supine therapeutic exercises, elevation, icing, and positioning. Pt was able to demo appropriate heel to toe mechanics with minimal correction or VC, pt was primarily limited by pain and had a slight antalgic gait. Pt is in no acute skilled PT needs at this time, anticipate pt to d/c home once medically clear. Recommend outpatient physical therapy services to address higher level deficits.    The following txt were provided: Pt was provided with VC and facilitation during therapeutic exercises. Pt required extensive correction to demonstrate appropriate mechanics during therapeutic exercises.     Plan    Recommend Physical Therapy for Evaluation only     DC Equipment Recommendations: (P) Front-Wheel Walker  Discharge Recommendations: (P) Recommend outpatient physical therapy services to address higher level deficits     Objective       05/10/22 1556   Initial Contact Note    Initial Contact Note Order Received and Verified, Evaluation Only - Patient Does Not Require Further Acute Physical Therapy at this Time.  However, May Benefit from Post Acute Therapy for Higher Level Functional Deficits.   Precautions   Precautions No Weight Bearing Restrictions Right Lower Extremity   Pain 0 - 10 Group   Location Knee   Location Orientation Right   Description Aching   Therapist Pain Assessment Prior to Activity;During Activity;Post Activity;Nurse Notified;4   Prior Living Situation   Prior Services None    Housing / Facility 1 Story House   Steps Into Home 3   Steps In Home 0   Rail Left Rail  (Steps into Home)   Elevator No   Equipment Owned Front-Wheel Walker   Lives with - Patient's Self Care Capacity Spouse   Comments spouse will be able to assist upon d/c to home   Prior Level of Functional Mobility   Bed Mobility Independent   Transfer Status Independent   Ambulation Independent   Distance Ambulation (Feet)   (community)   Assistive Devices Used None   Stairs Independent   History of Falls   History of Falls No   Cognition    Cognition / Consciousness WDL   Level of Consciousness Alert   Comments pleasant/cooperative   Passive ROM Lower Body   Passive ROM Lower Body X   Comments limited due to pain; able to demo R knee ROM of 0 to 90   Active ROM Lower Body    Active ROM Lower Body  X   Comments same as above   Strength Lower Body   Lower Body Strength  X   Comments limited due to pain; able to demo functional strength for B LE   Sensation Lower Body   Lower Extremity Sensation   WDL   Lower Body Muscle Tone   Lower Body Muscle Tone  WDL   Strength Upper Body   Upper Body Strength  WDL   Upper Body Muscle Tone   Upper Body Muscle Tone  WDL   Neurological Concerns   Neurological Concerns No   Coordination Upper Body   Coordination WDL   Coordination Lower Body    Coordination Lower Body  WDL   Balance Assessment   Sitting Balance (Static) Good   Sitting Balance (Dynamic) Good   Standing Balance (Static) Good   Standing Balance (Dynamic) Fair +   Weight Shift Sitting Good   Weight Shift Standing Fair   Comments w/fww   Gait Analysis   Gait Level Of Assist Supervised   Assistive Device Front Wheel Walker   Distance (Feet) 150   # of Times Distance was Traveled 1   Deviation Antalgic   # of Stairs Climbed 5   Level of Assist with Stairs Supervised   Weight Bearing Status none noted   Bed Mobility    Supine to Sit Supervised   Sit to Supine Supervised   Scooting Supervised   Rolling Supervised   Functional Mobility    Sit to Stand Supervised   Bed, Chair, Wheelchair Transfer Supervised   Transfer Method Stand Step   Mobility EOB, sit<>stand, ambulation, stairs, back to bed   Comments cues for handplacement   How much difficulty does the patient currently have...   Turning over in bed (including adjusting bedclothes, sheets and blankets)? 4   Sitting down on and standing up from a chair with arms (e.g., wheelchair, bedside commode, etc.) 4   Moving from lying on back to sitting on the side of the bed? 4   How much help from another person does the patient currently need...   Moving to and from a bed to a chair (including a wheelchair)? 4   Need to walk in a hospital room? 4   Climbing 3-5 steps with a railing? 3   6 clicks Mobility Score 23   Activity Tolerance   Sitting in Chair NT   Sitting Edge of Bed 5 mins   Standing 10 mins   Comments no adverse events noted   Edema / Skin Assessment   Edema / Skin  Not Assessed   Education Group   Education Provided Role of Physical Therapist   Role of Physical Therapist Patient Response Patient;Acceptance;Explanation;Demonstration;Verbal Demonstration;Action Demonstration   Anticipated Discharge Equipment and Recommendations   DC Equipment Recommendations Front-Wheel Walker   Discharge Recommendations Recommend outpatient physical therapy services to address higher level deficits   Interdisciplinary Plan of Care Collaboration   IDT Collaboration with  Nursing   Patient Position at End of Therapy In Bed;Call Light within Reach;Phone within Reach;Tray Table within Reach;Family / Friend in Room   Collaboration Comments aware of visit and recs   Session Information   Date / Session Number  5/10 eval only   Priority 0

## 2022-05-10 NOTE — OR NURSING
1110: Assumed care of patient.  Suctioned oral cavity, white/clear thick sputum.  Right eye sclera red on outer side; pt is asymptomtic.     1115:  Pt c/o right knee surgical pain; plan to give pain medication.     1122: OXYCODONE liquid given for surgical pain. VSS.     1130:  FENTANYL IV given for surgical pain. VSS.     1145:  Pt is drowsy; pt appears to be in no discomfort, VSS.     1200:  Pt states pain in right knee is at a tolerable level; pt refused any more pain med at this time. Bed assigned U #[id number]; RN to call for report. Pt's spouse called with updates and room number.     1215: No changes.  Pt reports pain remains at tolerable level.     1230: No changes.      1300: No changes. VSS.     1330: No changes. VSS.     1400:  Helped pt onto bed pan. 100 ml UOP, clear yellow.     1410:  Transferred to GSU.  VSS.

## 2022-05-10 NOTE — DISCHARGE INSTR - OTHER INFO
Discharge when meets criteria, advance diet as tolerated. May shower POD #2 with silver dressing in place. May remove stocking at POD #2. Ice and elevate extremity.  Patient has follow up appt. WBAT operative LE. Walker/crutches for ambulation. Patient has RX.

## 2022-05-10 NOTE — LETTER
April 12, 2022    Patient Name: Nancie Joyner  Surgeon Name: Jam Lowe M.D.  Surgery Facility: [facility] ([address] [facility])  Surgery Date: 5/10/2022    The time of your surgery is not final and may change up to and until the day of your surgery. You will be contacted 24-48 hours prior to your surgery date with your check-in and surgery time.    If you will not be at one of the below numbers please call/text the surgery scheduler at [phone]  Preferred Phone: [phone]    BEFORE YOUR SURGERY  Pre Registration and/or Lab Work must be done within and no earlier than 28 days prior to your surgery date. Please call [facility] at [phone] for an appointment as soon as possible.    Not scheduled at [facility] contact the scheduled facility for approved testing facilities.    Pre op Appointment:   Date: 05/04/2022   Time: 10:45 AM    Provider: Jam Lowe M.D.   Location: [address] [place] [id number]  Instructions: Bring a list of all medications you are taking including the dosing and frequency.    Please refrain from smoking any substance after midnight prior to surgery. Smoking may interfere with the anesthetic and frequently produces nausea during the recovery period.    Continue taking all lifesaving medications. Including the morning of your surgery with small sip of water.    Please read the MEDICATION INSTRUCTIONS below completely.    DAY OF YOUR SURGERY  Nothing to eat or drink after midnight     Please arrive at the hospital/surgery center at the check-in time provided.     An adult will need to bring you and take you home after your surgery.             AFTER YOUR SURGERY  Post op Appointment:   Date: 04/18/2022   Time: 10:45 AM    With: Jam Lowe M.D.   Location: [address] [place] [address]    - Therapy- Your first appointment should be 2  week(s) after your surgery. For your convenience we have 4  Physical Therapy locations: [facility], [place], and [facility]. Call our office ASAP to schedule an appointment at [phone] or take the enclosed Therapy Prescription to a facility of your choice.    TIME OFF WORK  FMLA or Disability forms can be faxed directly to: [phone] or you may drop them off at [address] [place] [address]. Our office charges a $35.00 fee per form. Forms will be completed within 10 business days of drop off and payment received. For the status of your forms you may contact our disability office directly [phone].    MEDICATION INSTRUCTIONS  The following medications should be stopped a minimum of 10 days prior to surgery:  All over the counter, Supplements & Herbal medications    Anorectics: Phentermine (Adipex-P, Lomaira and Suprenza), Phentermine-topiramate (Qsymia), Bupropion-naltrexone (Contrave)    Opiod Partial Agonists/Opioid Antagonists: Buprenorphine (Subocone, Belbuca, Butrans, Probuphine Implant, Sublocade), Naltrexone (ReVia, Vivitrol), Naloxone    Amphetamines: Dextroamphetamine/Amphetamine (Adderall, Mydayis), Methylphenidate Hydrochloride (Concerta, Metadate, Methylin, Ritalin)    The following medications should be stopped 5 days prior to surgery:  Blood Thinners: Any Aspirin, Aspirin products, anti-inflammatories such as ibuprofen and any blood thinners such as Coumadin and Plavix. Please consult your prescribing physician if you are on life saving blood thinners, in regards to when to stop medications prior to surgery.     The following medications should be stopped a minimum of 3 days prior to surgery:  PDE-5 inhibitors: Sildenafil (Viagra), Tadalafil (Cialis), Vardenafil (Levitra), Avanafil (Stendra)    MAO Inhibitors: Rasagiline (Azilect), Selegiline (Eldepryl, Emsam, Selapar), Isocarboxazid (Marplan), Phenelzine (Nardil)

## 2022-05-10 NOTE — ANESTHESIA PROCEDURE NOTES
Airway    Date/Time: 5/10/2022 9:48 AM  Performed by: Katty Mathews M.D.  Authorized by: Katty Mathews M.D.     Location:  OR  Urgency:  Elective  Indications for Airway Management:  Anesthesia      Spontaneous Ventilation: absent    Sedation Level:  Deep  Preoxygenated: Yes    Patient Position:  Sniffing  Mask Difficulty Assessment:  1 - vent by mask  Final Airway Type:  Endotracheal airway  Final Endotracheal Airway:  ETT  Cuffed: Yes    Technique Used for Successful ETT Placement:  Direct laryngoscopy  Devices/Methods Used in Placement:  Intubating stylet    Insertion Site:  Oral  Blade Type:  Renetta  Laryngoscope Blade/Videolaryngoscope Blade Size:  3  ETT Size (mm):  7.0  Measured from:  Teeth  ETT to Teeth (cm):  21  Placement Verified by: auscultation and capnometry    Cormack-Lehane Classification:  Grade I - full view of glottis  Number of Attempts at Approach:  1

## 2022-05-10 NOTE — ANESTHESIA POSTPROCEDURE EVALUATION
Patient: Nancie Joyner    Procedure Summary     Date: 05/10/22 Room / Location:  OR 03 / SURGERY [facility]    Anesthesia Start: 0944 Anesthesia Stop: 1104    Procedure: Right total knee arthroplasty (Right Knee) Diagnosis:       Osteoarthritis of right knee      (Osteoarthritis of right knee [M17.11])    Surgeons: Jam Lowe M.D. Responsible Provider: Katty Mathews M.D.    Anesthesia Type: general, peripheral nerve block ASA Status: 2          Final Anesthesia Type: general, peripheral nerve block  Last vitals  BP   Blood Pressure : 145/70    Temp   36.2 °C (97.2 °F)    Pulse   62   Resp   12    SpO2   96 %      Anesthesia Post Evaluation    Patient location during evaluation: PACU  Patient participation: complete - patient participated  Level of consciousness: awake and alert  Pain score: 4    Airway patency: patent  Anesthetic complications: no  Cardiovascular status: adequate  Respiratory status: acceptable  Hydration status: acceptable    PONV: none          No complications documented.     Nurse Pain Score: 4 (NPRS)

## 2022-05-10 NOTE — ANESTHESIA TIME REPORT
Anesthesia Start and Stop Event Times     Date Time Event    5/10/2022 0915 Ready for Procedure     0944 Anesthesia Start     1104 Anesthesia Stop        Responsible Staff  05/10/22    Name Role Begin End    Katty Mathews M.D. Anesth 0944 1104        Overtime Reason:  no overtime (within assigned shift)    Comments:

## 2022-05-10 NOTE — ANESTHESIA PREPROCEDURE EVALUATION
Case: [id number] Date/Time: 05/10/22 0945    Procedure: Right total knee arthroplasty (Right Knee)    Anesthesia type: General    Diagnosis: Osteoarthritis of right knee [M17.11]    Pre-op diagnosis: Osteoarthritis of right knee [M17.11]    Location:  OR 03 / SURGERY [facility]    Surgeons: Jam Lowe M.D.          Relevant Problems   NEURO   (positive) Epilepsy (HCC)      CARDIAC   (positive) Varicose veins of both lower extremities with pain       Physical Exam    Airway   Mallampati: I  TM distance: >3 FB  Neck ROM: full       Cardiovascular - normal exam     Dental   (+) upper dentures           Pulmonary   Breath sounds clear to auscultation     Abdominal   (+) obese     Neurological - normal exam                 Anesthesia Plan    ASA 2       Plan - general and peripheral nerve block     Peripheral nerve block will be post-op pain control  Airway plan will be ETT          Induction: intravenous      Pertinent diagnostic labs and testing reviewed    Informed Consent:    Anesthetic plan and risks discussed with patient.

## 2022-05-10 NOTE — LETTER
March 18, 2022    Patient Name: Nancie Joyner  Surgeon Name: Jam Lowe M.D.  Surgery Facility: [facility] ([address] [facility])  Surgery Date: 4/5/2022    The time of your surgery is not final and may change up to and until the day of your surgery. You will be contacted 24-48 hours prior to your surgery date with your check-in and surgery time.    If you will not be at one of the below numbers please call/text the surgery scheduler at [phone]  Preferred Phone: [phone]    BEFORE YOUR SURGERY  Pre Registration and/or Lab Work must be done within and no earlier than 28 days prior to your surgery date. Please call [facility] at [phone] for an appointment as soon as possible.     Not scheduled at [facility] contact the scheduled facility for approved testing facilities.    Pre op Appointment:   Date: 03/30/2022   Time: 9:45 AM    Provider: Jam Lowe M.D.   Location: [address] [place] [id number]  Instructions: Bring a list of all medications you are taking including the dosing and frequency.    Please refrain from smoking any substance after midnight prior to surgery. Smoking may interfere with the anesthetic and frequently produces nausea during the recovery period.    Continue taking all lifesaving medications. Including the morning of your surgery with small sip of water.    Please read the MEDICATION INSTRUCTIONS below completely.    DAY OF YOUR SURGERY  Nothing to eat or drink after midnight     Please arrive at the hospital/surgery center at the check-in time provided.     An adult will need to bring you and take you home after your surgery.             AFTER YOUR SURGERY  Post op Appointment:   Date: 04/18/2022   Time: 10:30 AM    With: Jam Lowe M.D.   Location: [address] [place] [id number]    - Therapy- Your first appointment should be 2  week(s) after your surgery. For your convenience we have 4  Physical Therapy locations: [facility], [place], and [facility]. Call our office ASAP to schedule an appointment at [phone] or take the enclosed Therapy Prescription to a facility of your choice.    TIME OFF WORK  FMLA or Disability forms can be faxed directly to: [phone] or you may drop them off at [address] [place] [address]. Our office charges a $35.00 fee per form. Forms will be completed within 10 business days of drop off and payment received. For the status of your forms you may contact our disability office directly [phone].    MEDICATION INSTRUCTIONS  The following medications should be stopped a minimum of 10 days prior to surgery:  All over the counter, Supplements & Herbal medications    Anorectics: Phentermine (Adipex-P, Lomaira and Suprenza), Phentermine-topiramate (Qsymia), Bupropion-naltrexone (Contrave)    Opiod Partial Agonists/Opioid Antagonists: Buprenorphine (Subocone, Belbuca, Butrans, Probuphine Implant, Sublocade), Naltrexone (ReVia, Vivitrol), Naloxone    Amphetamines: Dextroamphetamine/Amphetamine (Adderall, Mydayis), Methylphenidate Hydrochloride (Concerta, Metadate, Methylin, Ritalin)    The following medications should be stopped 5 days prior to surgery:  Blood Thinners: Any Aspirin, Aspirin products, anti-inflammatories such as ibuprofen and any blood thinners such as Coumadin and Plavix. Please consult your prescribing physician if you are on life saving blood thinners, in regards to when to stop medications prior to surgery.     The following medications should be stopped a minimum of 3 days prior to surgery:  PDE-5 inhibitors: Sildenafil (Viagra), Tadalafil (Cialis), Vardenafil (Levitra), Avanafil (Stendra)    MAO Inhibitors: Rasagiline (Azilect), Selegiline (Eldepryl, Emsam, Selapar), Isocarboxazid (Marplan), Phenelzine (Nardil)

## 2022-05-10 NOTE — DISCHARGE INSTRUCTIONS
Discharge Instructions    Discharged to home by car with relative. Discharged via wheelchair, hospital escort: Yes.  Special equipment needed: Wheelchair    Be sure to schedule a follow-up appointment with your primary care doctor or any specialists as instructed.     Discharge Plan:   Diet Plan: Discussed  Activity Level: Discussed  Confirmed Follow up Appointment: Patient to Call and Schedule Appointment  Confirmed Symptoms Management: Discussed  Medication Reconciliation Updated: Yes    I understand that a diet low in cholesterol, fat, and sodium is recommended for good health. Unless I have been given specific instructions below for another diet, I accept this instruction as my diet prescription.   Other diet: Resumed as before    Special Instructions: Discharge instructions for the Orthopedic Patient    Follow up with Primary Care Physician within 2 weeks of discharge to home, regarding:  Review of medications and diagnostic testing.  Surveillance for medical complications.  Workup and treatment of osteoporosis, if appropriate.     -Is this a Hip/Knee/Shoulder Joint Replacement patient? Yes   TOTAL KNEE REPLACEMENT, AFTER-CARE GUIDELINES     These instructions provide you with information on caring for yourself and your knee after surgery. Your health care provider may also give you instructions that are more specific. Your treatment was planned and performed according to current medical practices but problems sometimes occur. Call your health care provider if you have any problems or questions.     WHAT TO EXPECT AFTER THE PROCEDURE   After your procedure, your knee will typically be stiff, sore, and bruised. This will improve over time.     Pain   · Follow your home pain management plan as discussed with your nurse and as directed by your provider.   · It is important to follow any scheduled pain medications for maximal pain relief.   · If prescribed opioid medication, the goal is to use opioids only as  needed and to wean off prescription pain medicine as soon as possible.   · Ice can be used for pain control.   · Put ice in a plastic bag.   · Place a towel between your skin and the bag.   · Leave the ice on for 20 minutes, 2-3 times a day at a minimum.   · Most patients are off the pain pills by 3 weeks. If your pain continues to be severe, follow up with your provider.     Infection   Knee joint infections occur in fewer than 2% of patients. The most common causes of infection following total knee replacement surgery are from bacteria that enter the bloodstream during dental procedures, urinary tract infections, or skin infections. These bacteria can lodge around your knee replacement and cause an infection.   · Keep the incision as clean and dry as possible.   · Always wash your hands before touching your incision.   · Avoid dental care for 3 months after surgery. Your provider may recommend taking a dose of antibiotics an hour prior to any dental procedure. After 2 years, most providers recommend antibiotics only before an extensive procedure. Ask your provider what they recommend.   · Signs and symptoms of infection include low-grade fever, redness, pain, swelling and drainage from your incision. Notify your provider IMMEDIATELY if you develop ANY of these symptoms.     Post op Disturbances   · Bowel Habits - Constipation is extremely common and caused by a combination of anesthesia, lack of mobility, dehydration and pain medicine. Use stool softeners or laxatives if necessary. It is important not to ignore this problem as bowel obstructions can be a serious complication after joint replacement surgery.   · Mood/Energy Level - Many patients experience a lack of energy and endurance for up to 2-3 months after surgery. Some people feel down and can even become depressed. This is likely due to postoperative anemia, change in activity level, lack of sleep, pain medicine and just the emotional reaction to the  surgery itself that is a big disruption in a person’s life. This usually passes. If symptoms persist, follow up with your primary care provider.  · Returning to Work - Your provider will give you specific instructions based on your profession. Generally, if you work a sedentary job requiring little standing or walking, most patients may return within 2-6 weeks. Manual labor jobs involving walking, lifting and standing may take 3-4 months. Your provider’s office can provide a release to part-time or light duty work early on in your recovery and progress you to full duty as able.   · Driving - You can begin driving once cleared by your provider, provided you are no longer taking narcotic pain medication or any other medications that impair driving. Discuss the length of time expected with your provider as returning to driving depends on things such as your vehicle, which knee was replaced (right or left), and knee motion, strength and reflexes returning appropriately.   · Avoiding falls - A fall during the first few weeks after surgery can damage your new knee and may result in a need for further surgery.  throw rugs and tack down loose carpeting. Be aware of floor hazards such as pets, small objects or uneven surfaces. Notify your provider of any falls.   · Airport Metal Detectors - The sensitivity of metal detectors varies and it is likely that your prosthesis will cause an alarm. Inform the  of your artificial joint.     Diet   · Resume your normal diet as tolerated.   · It is important to achieve a healthy nutritional status by eating a well-balanced diet on a regular basis.   · Your provider may recommend that you take iron and vitamin supplements.   · Continue to drink plenty of fluids.     Shower/Bathing   · You may shower as soon as you get home from the hospital unless otherwise instructed.   · Keep your incision out of water to prevent infection. To keep the incision dry when  showering, cover it with a plastic bag or plastic wrap. If your bandage is waterproof, this may not be necessary. o Pat incision dry if it gets wet. Do not rub. Notify your provider.   · Do not submerge in a bath until cleared by your provider. Your staples must be out and the incision completely healed.     Dressing Change: Only change your dressing if directed by your provider.   · Wash hands.   · Open all dressing change materials.   · Remove old dressing and discard.   · Inspect incision for signs of irritation or infection including redness, increase in clear drainage, yellow/green drainage, odor and surrounding skin hot to touch. Notify your provider if present.   ·  the new dressing by one corner and lay over the incision. Be careful not to touch the inside of the dressing that will lay over the incision.   · Secure in place as instructed. Swelling/Bruising   · Swelling is normal after knee replacement and can involve the thigh, knee, calf and foot.   · Swelling can last from 3-6 months.   · To reduce swelling, elevate your leg higher than your heart while reclining. The first week you are home you should elevate your leg an equal amount of time as you are active.   · The swelling is usually worse after you go home since you are upright for longer periods of time.   · Bruising often does not appear until after you arrive home and can be quite dramatic- appearing purple, black, or green. Bruising is typically not concerning and will subside without any treatment.     Blood Clot Prevention   Your treatment plan includes multiple preventative measures to decrease the risk of blood clots in the legs (DVTs) and the less common, but serious, clots that travel to the lungs (pulmonary emboli). Most patients are at standard risk for them, but people who are at higher risk include those who have had previous clots, a family history of clotting, smoking, diabetes, obesity, advanced age, use estrogen and/or live a  sedentary lifestyle.     · Signs of blood clots in legs include - Swelling in thigh, calf or ankle that does not go down with elevation. Pain, heat and tenderness in calf, back of calf or groin area. NOTE: blood clots can occur in either leg.   · Signs of blood clots in lungs include - Sudden increased shortness of breath, sudden onset of chest pain, and localized chest pain with coughing.   · If you experience any of the above symptoms, notify your provider and seek medical attention immediately.   · You received anticoagulant therapy (blood thinners) in the hospital. Continue the prescribed blood-thinning medication at home, as directed by your provider.   · Your risk for developing a clot continues for up to 2-3 months after surgery. Avoid prolonged sitting and dehydration (long air trips and car trips). If you do take a trip during this time, please get up, move around every 1-1.5 hours, and discuss all travel plans with your provider.     Activity   Once home, stay active. The key is not to overdo it. While you can expect some good days and some bad days, you should notice a gradual improvement and a gradual increase in your endurance over the next 6 to 12 months. Exercise is a critical component of recovery, particularly during the first few weeks after surgery.     · Normal activities of daily living - Expect to resume most within 3 to 6 weeks following surgery. Some pain with activity and at night is common for several weeks after surgery. Walk as much as you like once your doctor gives permission to proceed, but remember that walking is no substitute for the exercises your doctor and physical therapist prescribe. Use a walker, crutches or cane to assist with walking until you can walk smoothly (minimal or no limp) without assistance.   · Physical Therapy Exercises - Follow your home exercise program as instructed by your physical therapist during your hospital stay. Call and set up outpatient physical  therapy appointments per your provider’s recommendations. Physical therapy after the hospital stay focuses on increasing your range of motion, strengthening your muscles and improving your gait/walking pattern. Contact your provider for the referral to outpatient physical therapy if you have not yet received this. -   · Riding a stationary bicycle can help maintain muscle tone and keep your knee flexible. Begin stationary bicycling as directed by your physical therapist or provider.   · Sexual Activity - Your provider can tell you when it safe to resume sexual activity.   · Sleeping Positions - You can safely sleep on your back, on either side, or on your stomach.   · Other Activities - Lower impact activities are preferred. Consult your provider if you have specific questions.     When to Call the Doctor   Call the provider if you experience:   · Fever over 100.5° F   · Increased pain, drainage, redness, odor or heat around the incision area   · Shaking chills   · Increased knee pain with activity and rest   · Increased pain in calf, tenderness or redness above or below the knee   · Increased swelling of calf, ankle, foot   · Sudden increased shortness of breath, sudden onset of chest pain, localized chest pain with coughing   · Incision opening   Or, if there are any questions or concerns about medications or care.     Infection statistic resource:   https://www.[employer].com/contents/prosthetic-joint-infection-epidemiology-microbiology-clinical-manifestations-and-diagnosis     -Is this patient being discharged with medication to prevent blood clots?  No    · Is patient discharged on Warfarin / Coumadin?   No     Depression / Suicide Risk    As you are discharged from this [facility] facility, it is important to learn how to keep safe from harming yourself.    Recognize the warning signs:  · Abrupt changes in personality, positive or negative- including increase in energy   · Giving away possessions  · Change in  eating patterns- significant weight changes-  positive or negative  · Change in sleeping patterns- unable to sleep or sleeping all the time   · Unwillingness or inability to communicate  · Depression  · Unusual sadness, discouragement and loneliness  · Talk of wanting to die  · Neglect of personal appearance   · Rebelliousness- reckless behavior  · Withdrawal from people/activities they love  · Confusion- inability to concentrate     If you or a loved one observes any of these behaviors or has concerns about self-harm, here's what you can do:  · Talk about it- your feelings and reasons for harming yourself  · Remove any means that you might use to hurt yourself (examples: pills, rope, extension cords, firearm)  · Get professional help from the community (Mental Health, Substance Abuse, psychological counseling)  · Do not be alone:Call your Safe Contact- someone whom you trust who will be there for you.  · Call your local CRISIS HOTLINE 175-5606 or 193-307-3955  · Call your local Children's Mobile Crisis Response Team [place] (603) 891-1549 or www.[employer]  · Call the toll free National Suicide Prevention Hotlines   · National Suicide Prevention Lifeline 260-189-MDTP (1017)  · National Hope Line Network 800-SUICIDE (762-7857)

## 2022-05-10 NOTE — ANESTHESIA PROCEDURE NOTES
Peripheral Block    Date/Time: 5/10/2022 9:12 AM  Performed by: Katty Mathews M.D.  Authorized by: Katty Mathews M.D.     Start Time:  5/10/2022 9:12 AM  Reason for Block: at surgeon's request and post-op pain management ONLY    patient identified, IV checked, site marked, risks and benefits discussed, surgical consent, monitors and equipment checked, pre-op evaluation and timeout performed    Patient Position:  Supine  Prep: ChloraPrep    Monitoring:  Heart rate, continuous pulse ox and cardiac monitor  Block Region:  Lower Extremity  Lower Extremity - Block Type:  Selective FEMORAL nerve block at the Adductor Canal    Laterality:  Right  Procedures: ultrasound guided  Image captured, interpreted and electronically stored.  Local Infiltration:  Lidocaine  Strength:  1 %  Dose:  3 ml  Block Type:  Single-shot  Needle Length:  100mm  Needle Gauge:  21 G  Needle Localization:  Ultrasound guidance  Injection Assessment:  Negative aspiration for heme, no paresthesia on injection, incremental injection and local visualized surrounding nerve on ultrasound

## 2022-05-10 NOTE — OP REPORT
SURGEON:  Jam Lowe M.D.    PREOPERATIVE DIAGNOSIS:  Right knee degenerative joint disease.    POSTOPERATIVE DIAGNOSIS:  Right knee degenerative joint disease.    PROCEDURES PERFORMED:  Right Press Fit total knee arthroplasty.    ASSISTANT:  Bhumi Clifton PA-C    ANESTHESIA:  General and an adductor canal nerve block.    ANESTHESIOLOGIST:  Eloina Mathews MD    IMPLANTS:  Elizabeth Triathlon size 5 Press Fit femoral component, size 4 Press Fit tibial component, with a 9 mm cruciate-retaining articular insert.    TOTAL TOURNIQUET TIME:  29 minutes.    COMPLICATIONS:  None.    DISPOSITION:  Stable to Post Anesthesia Care Unit.    INDICATIONS:  The patient has had progressive right knee pain and deformity, which has been unresponsive to conservative management.  The risks, benefits, alternatives, and limitations of surgical intervention were discussed in detail.  The patient has expressed understanding and desires to proceed.    PROCEDURE IN DETAIL:  The patient and the corrective operative extremity were identified in the preoperative area.  The knee was marked.  The patient was brought to the operating room where the correct operative extremity was again confirmed.  They were placed supine on the OR table after undergoing an adductor canal nerve block performed at my request for postoperative pain control.  General anesthesia was then induced without complication.  Examination under anesthesia showed limited range of motion.  The knee was prepped with alcohol.  This was allowed to dry.  The knee was then prepped and draped in the usual sterile fashion using ChloraPrep.  An Esmarch was used to exsanguinate the right lower extremity, and the tourniquet was inflated to 250 mmHg.    A longitudinal incision was centered over the patella.  Sharp dissection was carried down to the level of the peritenon.  A medial parapatellar arthrotomy was performed, and a medial release was performed.  The anterior horns of the medial and  lateral menisci were excised.  The fat pad was excised and the patella everted.  The centering drill was placed in the distal femur and an intramedullary distal femoral resection was then performed followed by an extramedullary tibial resection.  The spacer block fit well in extension.  We removed the osteophytes from the medial distal femur and the medial proximal tibia.  We removed the osteophytes from the posterior aspect of the knee, performed the finishing cuts on the femur, placed trials on the femur and the tibia.  We took the knee through a full range of motion.  We had excellent balance in flexion and extension.  Full range of motion.  The patella was was well preserved, so was not resurfaced.  We then punched the distal femur and the proximal tibia, removed the trials, then impacted the real Press Fit tibial component followed by the femoral component, then placed the real articular insert.  We again had excellent range of motion, excellent stability.  We then deflated the tourniquet, obtained hemostasis.  We did a Betadine flush of the wound, copiously irrigated the wound, and closed the extensor mechanism with a combination of interrupted No. 1 Vicryl suture and a running No. 2 Quill stitch.  We closed the deep subcutaneous layer with Monocryl and closed the skin with staples.  The knee was injected with 0.5% bupivacaine with epinephrine.  The knee was loosely overwrapped with an ACE wrap.  A ISAAC stocking was placed.  The patient was then allowed to awake from anesthesia, transferred to their hospital cart, and taken to the Post Anesthesia Care Unit in stable condition. The patient tolerated the procedure well. There were no immediate complications.

## 2022-05-11 NOTE — THERAPY
"Occupational Therapy   Initial Evaluation     Patient Name: Nancie Joyner  Age:  76 y.o., Sex:  female  Medical Record #: [id number]  Today's Date: 5/10/2022     Precautions  Precautions: Fall Risk,No Weight Bearing Restrictions Right Lower Extremity    Assessment  Patient is a 76 y.o. female seen for OT eval following R TKA. Pt has a very supportive spouse at bedside who reports that he can assist at home as needed. He typically assists pt with donning socks at home. During OT eval, pt required assist for her right socks and shoe but was able to don shorts without assist. She completed toilet transfer with supervision. Pt and spouse verbalized understanding of all education and deny having any further questions or concerns at this time. Pt with no further skilled OT needs in the acute care setting.      Plan    Recommend Occupational Therapy for Evaluation only     DC Equipment Recommendations: None  Discharge Recommendations: Anticipate that the patient will have no further occupational therapy needs after discharge from the hospital     Subjective    \"I feel pretty good.\"      Objective       05/10/22 1650   Prior Living Situation   Prior Services None   Housing / Facility 1 Story House   Steps Into Home 3   Steps In Home 0   Bathroom Set up Walk In Shower;Shower Chair   Equipment Owned Front-Wheel Walker;Tub / Shower Seat;Bed Side Commode   Lives with - Patient's Self Care Capacity Spouse   Comments spouse available to assist prn, he already helps patient with socks   Prior Level of ADL Function   Self Feeding Independent   Grooming / Hygiene Independent   Bathing Independent   Dressing Independent   Toileting Independent   Prior Level of IADL Function   Medication Management Independent   Laundry Independent   Kitchen Mobility Independent  (spouse does the cooking)   Home Management Requires Assist  (spouse helps with cleaning)   Shopping Independent   Prior Level Of Mobility Independent Without Device " in Community   Driving / Transportation Relatives / Others Provide Transportation  (spouse does not drive due to seizures)   Precautions   Precautions Fall Risk;No Weight Bearing Restrictions Right Lower Extremity   Vitals   O2 (LPM) 0   O2 Delivery Device None - Room Air   Pain 0 - 10 Group   Location Knee   Location Orientation Right   Therapist Pain Assessment During Activity;Nurse Notified  (pt reports mild knee pain)   Cognition    Cognition / Consciousness WDL   Level of Consciousness Alert   Comments pleasant and cooperative   Active ROM Upper Body   Active ROM Upper Body  WDL   Strength Upper Body   Upper Body Strength  WDL   Upper Body Muscle Tone   Upper Body Muscle Tone  WDL   Coordination Upper Body   Coordination WDL   Balance Assessment   Sitting Balance (Static) Good   Sitting Balance (Dynamic) Good   Standing Balance (Static) Good   Standing Balance (Dynamic) Fair +   Weight Shift Sitting Good   Weight Shift Standing Fair   Comments standing with FWW   Bed Mobility    Supine to Sit Supervised   Scooting Supervised   ADL Assessment   Upper Body Dressing Supervision   Lower Body Dressing Minimal Assist  (able to don shorts without assist, required assist for R sock and shoe)   Toileting   (denied need but did demo transfer)   Comments Pt declined education on LB dressing AE. She already has a shoe horn at home and plans to have her spouse assist as needed.   6 Clicks Daily Activity Score 22   Functional Mobility   Sit to Stand Supervised   Toilet Transfers Supervised  (used grab bar, will have a BSC over her toilet at home)   Mobility walked to/from bathroom with FWW, no LOB noted   Education Group   Education Provided Role of Occupational Therapist;Activities of Daily Living;Transfers   Role of Occupational Therapist Patient Response Patient;Family;Acceptance;Explanation;Verbal Demonstration   Transfers Patient Response Patient;Family;Acceptance;Explanation;Demonstration;Verbal Demonstration;Action  Demonstration   ADL Patient Response Patient;Family;Acceptance;Explanation;Demonstration;Verbal Demonstration;Action Demonstration   Problem List   Problem List None

## 2022-08-01 DIAGNOSIS — J30.2 SEASONAL ALLERGIES: ICD-10-CM

## 2022-08-01 RX ORDER — FLUTICASONE PROPIONATE 50 MCG
SPRAY, SUSPENSION (ML) NASAL
Qty: 16 G | Refills: 0 | Status: SHIPPED | OUTPATIENT
Start: 2022-08-01 | End: 2023-02-21 | Stop reason: SDUPTHER

## 2022-08-01 NOTE — TELEPHONE ENCOUNTER
Requested Prescriptions     Signed Prescriptions Disp Refills   • fluticasone (FLONASE) 50 MCG/ACT nasal spray 16 g 0     Sig: USE 1 SPRAY IN EACH NOSTRIL EVERY DAY (60 DAYS SUPPLY)     Authorizing Provider: OSIEL HERNANDEZ A.P.R.N.

## 2022-08-11 ENCOUNTER — OFFICE VISIT (OUTPATIENT)
Dept: MEDICAL GROUP | Facility: PHYSICIAN GROUP | Age: 77
End: 2022-08-11
Payer: MEDICARE

## 2022-08-11 VITALS
DIASTOLIC BLOOD PRESSURE: 60 MMHG | TEMPERATURE: 98.1 F | BODY MASS INDEX: 33.66 KG/M2 | WEIGHT: 190 LBS | SYSTOLIC BLOOD PRESSURE: 118 MMHG | HEART RATE: 78 BPM | OXYGEN SATURATION: 97 % | HEIGHT: 63 IN

## 2022-08-11 DIAGNOSIS — M62.838 MUSCLE SPASM: ICD-10-CM

## 2022-08-11 DIAGNOSIS — G40.802 OTHER EPILEPSY WITHOUT STATUS EPILEPTICUS, NOT INTRACTABLE (HCC): ICD-10-CM

## 2022-08-11 PROCEDURE — 99214 OFFICE O/P EST MOD 30 MIN: CPT | Performed by: NURSE PRACTITIONER

## 2022-08-11 RX ORDER — TIZANIDINE 4 MG/1
4 TABLET ORAL EVERY 6 HOURS PRN
Qty: 20 TABLET | Refills: 0 | Status: SHIPPED | OUTPATIENT
Start: 2022-08-11 | End: 2022-08-16

## 2022-08-11 ASSESSMENT — FIBROSIS 4 INDEX: FIB4 SCORE: 1.77

## 2022-08-11 NOTE — PROGRESS NOTES
Subjective:     CC: Knee pain, muscle tightness    HPI:   Nancie is an established patient of KEREN Yip who presents today with her friend for the following:    Chronic right knee pain  On 5/10/22 right TKA with Dr. Lowe.  She states that the muscles to the right side of her knee and upper leg tighten and spasm.  This is occurring intermittently.  It can keep her awake at nighttime.  She is asking for a muscle relaxant.  History of epilepsy that is currently stable with Dilantin.  She has tolerated cyclobenzaprine in the past, although this was several years ago. She is participating with PT with recent session this morning. She is doing her PT exercises at home and these are not helping with the muscle spasms/tightness.     Epilepsy  She is on dilantin 500 mg nightly.  She does not currently have a neurologist, states that her neurologist has left.  She is aware that she is to establish with new neurologist.  Denies any recent seizures.       Past Medical History:   Diagnosis Date    Arthritis     hands, knees, lower back    Basal cell carcinoma     left eyelid    Breath shortness     CKD (chronic kidney disease) stage 3, GFR 30-59 ml/min ([facility]) 2016    Dental disorder     left lower permanent bridge. upper dentures    Epilepsy ([facility]) 2016    Heart burn     acid reflux    Osteopenia 2016    Pneumonia 2015    Seasonal allergies 2016    Seizure disorder ([facility])     UI (urinary incontinence) 2016    Varicose veins 2016    Vitamin B12 deficiency 2016    Vitamin D deficiency 2016       Social History     Tobacco Use    Smoking status: Former     Packs/day: 0.50     Years: 2.00     Pack years: 1.00     Types: Cigarettes     Start date: 1977     Quit date: 3/28/1979     Years since quittin.4    Smokeless tobacco: Never   Vaping Use    Vaping Use: Never used   Substance Use Topics    Alcohol use: No     Alcohol/week: 0.0 oz    Drug use: No       Current Outpatient  "Medications Ordered in Epic   Medication Sig Dispense Refill    tizanidine (ZANAFLEX) 4 MG Tab Take 1 Tablet by mouth every 6 hours as needed (muscle spasm) for up to 5 days. 20 Tablet 0    fluticasone (FLONASE) 50 MCG/ACT nasal spray USE 1 SPRAY IN EACH NOSTRIL EVERY DAY (60 DAYS SUPPLY) 16 g 0    phenytoin ER (DILANTIN) 100 MG Cap TAKE 5 CAPSULES NIGHTLY (SCHEDULE WITH [facility] FOR FUTURE REFILL) 450 Capsule 0    Azelastine HCl 137 MCG/SPRAY Solution as needed.      omeprazole (PRILOSEC) 20 MG delayed-release capsule Take 1 Capsule by mouth every morning before breakfast. 90 Capsule 3    polyethylene glycol-electrolytes (NULYTELY) 420 GM solution       cetirizine (ZYRTEC) 10 MG Tab Take 1 Tablet by mouth every day. 90 Tablet 3    Calcium-Magnesium-Vitamin D (CALCIUM 500 PO) Take  by mouth.      Multiple Vitamin (MULTI-VITAMINS PO) Take  by mouth.      Cholecalciferol (VITAMIN D PO) Take  by mouth.      Cyanocobalamin (VITAMIN B12 PO) Take  by mouth.       No current Epic-ordered facility-administered medications on file.       Allergies:  Patient has no known allergies.    Health Maintenance: Deferred      Objective:     Vital signs reviewed  Exam:  /60 (BP Location: Right arm, Patient Position: Sitting, BP Cuff Size: Adult)   Pulse 78   Temp 36.7 °C (98.1 °F) (Temporal)   Ht 1.6 m (5' 3\")   Wt 86.2 kg (190 lb)   SpO2 97%   BMI 33.66 kg/m²  Body mass index is 33.66 kg/m².    Gen: Alert and oriented, No apparent distress.  Friend present in exam room.  Neck: Neck is supple, full ROM.  Lungs: Normal effort, no audible wheezes  CV: Skin pink, warm and dry.  Ext: No clubbing, cyanosis, edema.  Antalgic gait.  Using 4-prong cane with ambulation.  There is muscular tightness to right lateral knee, right posterior knee and right quadriceps.  No pain on palpation to knee today.  Per patient her incision is clean without any discharge or redness.      Assessment & Plan:     76 y.o. female with the " following -     1. Muscle spasm  Chronic exacerbated problem.  Start low-dose tizanidine.  I have verified her medication interaction with up-to-date with her Dilantin.  No interaction noted.  Discussed medication.  Encouraged continued physical therapy, resting, icing, elevating and stretching.  Follow-up as needed.  - tizanidine (ZANAFLEX) 4 MG Tab; Take 1 Tablet by mouth every 6 hours as needed (muscle spasm) for up to 5 days.  Dispense: 20 Tablet; Refill: 0    2. Other epilepsy without status epilepticus, not intractable (HCC)  Chronic stable problem.  Continue with Dilantin 500 mg daily.  Encouraged to schedule with new neurologist.      Return if symptoms worsen or fail to improve.    Educated in proper administration of medication(s) ordered today including safety, possible SE, risks, benefits, rationale and alternatives to therapy.     Please note that this dictation was created using voice recognition software. I have made every reasonable attempt to correct obvious errors, but I expect that there are errors of grammar and possibly content that I did not discover before finalizing the note.

## 2022-08-11 NOTE — ASSESSMENT & PLAN NOTE
She is on dilantin 500 mg nightly.  She does not currently have a neurologist, states that her neurologist has left.  She is aware that she is to establish with new neurologist.  Denies any recent seizures.

## 2022-09-19 DIAGNOSIS — G40.802 OTHER EPILEPSY WITHOUT STATUS EPILEPTICUS, NOT INTRACTABLE (HCC): ICD-10-CM

## 2022-09-26 RX ORDER — PHENYTOIN SODIUM 100 MG/1
CAPSULE, EXTENDED RELEASE ORAL
Qty: 450 CAPSULE | Refills: 3 | Status: SHIPPED | OUTPATIENT
Start: 2022-09-26 | End: 2023-07-17 | Stop reason: SDUPTHER

## 2022-11-03 ENCOUNTER — PATIENT MESSAGE (OUTPATIENT)
Dept: HEALTH INFORMATION MANAGEMENT | Facility: OTHER | Age: 77
End: 2022-11-03

## 2022-11-20 DIAGNOSIS — K31.A0 GASTRIC INTESTINAL METAPLASIA: ICD-10-CM

## 2022-11-21 RX ORDER — OMEPRAZOLE 20 MG/1
CAPSULE, DELAYED RELEASE ORAL
Qty: 90 CAPSULE | Refills: 0 | Status: SHIPPED | OUTPATIENT
Start: 2022-11-21 | End: 2023-02-02 | Stop reason: SDUPTHER

## 2022-11-22 NOTE — TELEPHONE ENCOUNTER
Requested Prescriptions     Signed Prescriptions Disp Refills    omeprazole (PRILOSEC) 20 MG delayed-release capsule 90 Capsule 0     Sig: TAKE 1 CAPSULE EVERY MORNING BEFORE BREAKFAST     Authorizing Provider: OSIEL HERNANDEZ A.P.R.N.

## 2023-02-02 ENCOUNTER — HOSPITAL ENCOUNTER (OUTPATIENT)
Facility: MEDICAL CENTER | Age: 78
End: 2023-02-02
Attending: NURSE PRACTITIONER
Payer: MEDICARE

## 2023-02-02 ENCOUNTER — OFFICE VISIT (OUTPATIENT)
Dept: MEDICAL GROUP | Facility: PHYSICIAN GROUP | Age: 78
End: 2023-02-02
Payer: MEDICARE

## 2023-02-02 VITALS
HEART RATE: 102 BPM | BODY MASS INDEX: 36.14 KG/M2 | OXYGEN SATURATION: 94 % | SYSTOLIC BLOOD PRESSURE: 118 MMHG | TEMPERATURE: 98.4 F | WEIGHT: 204 LBS | DIASTOLIC BLOOD PRESSURE: 62 MMHG | HEIGHT: 63 IN

## 2023-02-02 DIAGNOSIS — Z72.89 OTHER PROBLEMS RELATED TO LIFESTYLE: ICD-10-CM

## 2023-02-02 DIAGNOSIS — N30.00 ACUTE CYSTITIS WITHOUT HEMATURIA: ICD-10-CM

## 2023-02-02 DIAGNOSIS — E66.01 CLASS 2 SEVERE OBESITY DUE TO EXCESS CALORIES WITH SERIOUS COMORBIDITY AND BODY MASS INDEX (BMI) OF 36.0 TO 36.9 IN ADULT (HCC): ICD-10-CM

## 2023-02-02 DIAGNOSIS — E53.8 VITAMIN B12 DEFICIENCY: ICD-10-CM

## 2023-02-02 DIAGNOSIS — E78.5 DYSLIPIDEMIA: ICD-10-CM

## 2023-02-02 DIAGNOSIS — N39.46 MIXED STRESS AND URGE URINARY INCONTINENCE: ICD-10-CM

## 2023-02-02 DIAGNOSIS — Z11.59 NEED FOR HEPATITIS C SCREENING TEST: ICD-10-CM

## 2023-02-02 DIAGNOSIS — M85.88 OSTEOPENIA OF SPINE: ICD-10-CM

## 2023-02-02 DIAGNOSIS — E55.9 VITAMIN D DEFICIENCY: ICD-10-CM

## 2023-02-02 DIAGNOSIS — R41.3 MEMORY LOSS OF UNKNOWN CAUSE: ICD-10-CM

## 2023-02-02 DIAGNOSIS — G40.802 OTHER EPILEPSY WITHOUT STATUS EPILEPTICUS, NOT INTRACTABLE (HCC): ICD-10-CM

## 2023-02-02 DIAGNOSIS — K31.A0 GASTRIC INTESTINAL METAPLASIA: ICD-10-CM

## 2023-02-02 PROBLEM — R05.9 COUGH: Status: RESOLVED | Noted: 2021-10-12 | Resolved: 2023-02-02

## 2023-02-02 PROBLEM — R09.81 SINUS CONGESTION: Status: RESOLVED | Noted: 2021-12-07 | Resolved: 2023-02-02

## 2023-02-02 LAB
APPEARANCE UR: NORMAL
BILIRUB UR STRIP-MCNC: NORMAL MG/DL
COLOR UR AUTO: NORMAL
GLUCOSE UR STRIP.AUTO-MCNC: NORMAL MG/DL
KETONES UR STRIP.AUTO-MCNC: NORMAL MG/DL
LEUKOCYTE ESTERASE UR QL STRIP.AUTO: NORMAL
NITRITE UR QL STRIP.AUTO: POSITIVE
PH UR STRIP.AUTO: 7 [PH] (ref 5–8)
PROT UR QL STRIP: NORMAL MG/DL
RBC UR QL AUTO: NORMAL
SP GR UR STRIP.AUTO: 1.02
UROBILINOGEN UR STRIP-MCNC: 0.2 MG/DL

## 2023-02-02 PROCEDURE — 87077 CULTURE AEROBIC IDENTIFY: CPT | Mod: 91

## 2023-02-02 PROCEDURE — 81002 URINALYSIS NONAUTO W/O SCOPE: CPT | Performed by: NURSE PRACTITIONER

## 2023-02-02 PROCEDURE — 99214 OFFICE O/P EST MOD 30 MIN: CPT | Performed by: NURSE PRACTITIONER

## 2023-02-02 PROCEDURE — 87086 URINE CULTURE/COLONY COUNT: CPT

## 2023-02-02 PROCEDURE — 87186 SC STD MICRODIL/AGAR DIL: CPT | Mod: 91

## 2023-02-02 RX ORDER — OMEPRAZOLE 20 MG/1
20 CAPSULE, DELAYED RELEASE ORAL
Qty: 90 CAPSULE | Refills: 3 | Status: SHIPPED | OUTPATIENT
Start: 2023-02-02 | End: 2024-02-28 | Stop reason: SDUPTHER

## 2023-02-02 RX ORDER — SULFAMETHOXAZOLE AND TRIMETHOPRIM 800; 160 MG/1; MG/1
1 TABLET ORAL 2 TIMES DAILY
Qty: 6 TABLET | Refills: 0 | Status: SHIPPED | OUTPATIENT
Start: 2023-02-02 | End: 2023-02-05

## 2023-02-02 ASSESSMENT — PATIENT HEALTH QUESTIONNAIRE - PHQ9
CLINICAL INTERPRETATION OF PHQ2 SCORE: 3
SUM OF ALL RESPONSES TO PHQ QUESTIONS 1-9: 13
5. POOR APPETITE OR OVEREATING: 3 - NEARLY EVERY DAY

## 2023-02-02 ASSESSMENT — FIBROSIS 4 INDEX: FIB4 SCORE: 1.79

## 2023-02-02 NOTE — ASSESSMENT & PLAN NOTE
Chronic, ongoing.  Last DEXA scan completed in 2019, declines repeat screening.  Continues vitamin D supplement.  Does not engage in weightbearing exercise.

## 2023-02-02 NOTE — ASSESSMENT & PLAN NOTE
New to examiner.  Patient reports that about 1 week ago the patient had an episode that he is not sure if it was a seizure or not.  The nurse at the [facility] reported that the patient may be had a 20 second seizure.  She has continued phenytoin for epilepsy, but is not currently followed by neurology.  She also reports some urinary incontinence that is worsening.

## 2023-02-05 LAB
BACTERIA UR CULT: ABNORMAL
SIGNIFICANT IND 70042: ABNORMAL
SITE SITE: ABNORMAL
SOURCE SOURCE: ABNORMAL

## 2023-02-06 ENCOUNTER — HOSPITAL ENCOUNTER (OUTPATIENT)
Dept: LAB | Facility: MEDICAL CENTER | Age: 78
End: 2023-02-06
Attending: NURSE PRACTITIONER
Payer: MEDICARE

## 2023-02-06 DIAGNOSIS — G40.802 OTHER EPILEPSY WITHOUT STATUS EPILEPTICUS, NOT INTRACTABLE (HCC): ICD-10-CM

## 2023-02-06 DIAGNOSIS — E78.5 DYSLIPIDEMIA: ICD-10-CM

## 2023-02-06 DIAGNOSIS — A49.1 INFECTION DUE TO STREPTOCOCCUS GALLOLYTICUS: ICD-10-CM

## 2023-02-06 DIAGNOSIS — E53.8 VITAMIN B12 DEFICIENCY: ICD-10-CM

## 2023-02-06 DIAGNOSIS — R41.3 MEMORY LOSS OF UNKNOWN CAUSE: ICD-10-CM

## 2023-02-06 DIAGNOSIS — E55.9 VITAMIN D DEFICIENCY: ICD-10-CM

## 2023-02-06 DIAGNOSIS — Z11.59 NEED FOR HEPATITIS C SCREENING TEST: ICD-10-CM

## 2023-02-06 DIAGNOSIS — M85.88 OSTEOPENIA OF SPINE: ICD-10-CM

## 2023-02-06 DIAGNOSIS — Z72.89 OTHER PROBLEMS RELATED TO LIFESTYLE: ICD-10-CM

## 2023-02-06 LAB
25(OH)D3 SERPL-MCNC: 78 NG/ML (ref 30–100)
ALBUMIN SERPL BCP-MCNC: 4.4 G/DL (ref 3.2–4.9)
ALBUMIN/GLOB SERPL: 1.6 G/DL
ALP SERPL-CCNC: 91 U/L (ref 30–99)
ALT SERPL-CCNC: 21 U/L (ref 2–50)
ANION GAP SERPL CALC-SCNC: 9 MMOL/L (ref 7–16)
AST SERPL-CCNC: 24 U/L (ref 12–45)
BASOPHILS # BLD AUTO: 0.8 % (ref 0–1.8)
BASOPHILS # BLD: 0.04 K/UL (ref 0–0.12)
BILIRUB SERPL-MCNC: 0.2 MG/DL (ref 0.1–1.5)
BUN SERPL-MCNC: 20 MG/DL (ref 8–22)
CALCIUM ALBUM COR SERPL-MCNC: 8.8 MG/DL (ref 8.5–10.5)
CALCIUM SERPL-MCNC: 9.1 MG/DL (ref 8.5–10.5)
CHLORIDE SERPL-SCNC: 104 MMOL/L (ref 96–112)
CHOLEST SERPL-MCNC: 182 MG/DL (ref 100–199)
CO2 SERPL-SCNC: 25 MMOL/L (ref 20–33)
CREAT SERPL-MCNC: 0.96 MG/DL (ref 0.5–1.4)
EOSINOPHIL # BLD AUTO: 0.18 K/UL (ref 0–0.51)
EOSINOPHIL NFR BLD: 3.4 % (ref 0–6.9)
ERYTHROCYTE [DISTWIDTH] IN BLOOD BY AUTOMATED COUNT: 48.5 FL (ref 35.9–50)
FASTING STATUS PATIENT QL REPORTED: NORMAL
GFR SERPLBLD CREATININE-BSD FMLA CKD-EPI: 60 ML/MIN/1.73 M 2
GLOBULIN SER CALC-MCNC: 2.7 G/DL (ref 1.9–3.5)
GLUCOSE SERPL-MCNC: 93 MG/DL (ref 65–99)
HCT VFR BLD AUTO: 44.5 % (ref 37–47)
HCV AB SER QL: NORMAL
HDLC SERPL-MCNC: 65 MG/DL
HGB BLD-MCNC: 14.4 G/DL (ref 12–16)
IMM GRANULOCYTES # BLD AUTO: 0.01 K/UL (ref 0–0.11)
IMM GRANULOCYTES NFR BLD AUTO: 0.2 % (ref 0–0.9)
LDLC SERPL CALC-MCNC: 94 MG/DL
LYMPHOCYTES # BLD AUTO: 1.65 K/UL (ref 1–4.8)
LYMPHOCYTES NFR BLD: 31.1 % (ref 22–41)
MCH RBC QN AUTO: 33.4 PG (ref 27–33)
MCHC RBC AUTO-ENTMCNC: 32.4 G/DL (ref 33.6–35)
MCV RBC AUTO: 103.2 FL (ref 81.4–97.8)
MONOCYTES # BLD AUTO: 0.4 K/UL (ref 0–0.85)
MONOCYTES NFR BLD AUTO: 7.5 % (ref 0–13.4)
NEUTROPHILS # BLD AUTO: 3.03 K/UL (ref 2–7.15)
NEUTROPHILS NFR BLD: 57 % (ref 44–72)
NRBC # BLD AUTO: 0 K/UL
NRBC BLD-RTO: 0 /100 WBC
PHENYTOIN SERPL-MCNC: 27.9 UG/ML (ref 10–20)
PLATELET # BLD AUTO: 204 K/UL (ref 164–446)
PMV BLD AUTO: 10.2 FL (ref 9–12.9)
POTASSIUM SERPL-SCNC: 4.5 MMOL/L (ref 3.6–5.5)
PROT SERPL-MCNC: 7.1 G/DL (ref 6–8.2)
RBC # BLD AUTO: 4.31 M/UL (ref 4.2–5.4)
SODIUM SERPL-SCNC: 138 MMOL/L (ref 135–145)
TRIGL SERPL-MCNC: 116 MG/DL (ref 0–149)
TSH SERPL DL<=0.005 MIU/L-ACNC: 3.92 UIU/ML (ref 0.38–5.33)
VIT B12 SERPL-MCNC: 502 PG/ML (ref 211–911)
WBC # BLD AUTO: 5.3 K/UL (ref 4.8–10.8)

## 2023-02-06 PROCEDURE — 36415 COLL VENOUS BLD VENIPUNCTURE: CPT

## 2023-02-06 PROCEDURE — 82607 VITAMIN B-12: CPT

## 2023-02-06 PROCEDURE — 82306 VITAMIN D 25 HYDROXY: CPT

## 2023-02-06 PROCEDURE — 80061 LIPID PANEL: CPT

## 2023-02-06 PROCEDURE — 80185 ASSAY OF PHENYTOIN TOTAL: CPT

## 2023-02-06 PROCEDURE — 80053 COMPREHEN METABOLIC PANEL: CPT

## 2023-02-06 PROCEDURE — 85025 COMPLETE CBC W/AUTO DIFF WBC: CPT

## 2023-02-06 PROCEDURE — 86803 HEPATITIS C AB TEST: CPT

## 2023-02-06 PROCEDURE — 84443 ASSAY THYROID STIM HORMONE: CPT

## 2023-02-06 RX ORDER — PENICILLIN V POTASSIUM 500 MG/1
500 TABLET ORAL 3 TIMES DAILY
Qty: 30 TABLET | Refills: 0 | Status: SHIPPED | OUTPATIENT
Start: 2023-02-06 | End: 2023-02-16

## 2023-02-06 NOTE — PROGRESS NOTES
1. 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 Take one hour before meals or two hours after meals.  Dispense: 30 Tablet; Refill: 0

## 2023-02-08 PROBLEM — E66.01 CLASS 2 SEVERE OBESITY DUE TO EXCESS CALORIES WITH SERIOUS COMORBIDITY AND BODY MASS INDEX (BMI) OF 36.0 TO 36.9 IN ADULT (HCC): Status: ACTIVE | Noted: 2019-03-28

## 2023-02-08 NOTE — ASSESSMENT & PLAN NOTE
Chronic, ongoing.  Continues phenytoin  mg nightly.  She was following with neurology, but states that her neurologist left the practice.  She has been referred to neurology, but has not established.

## 2023-02-08 NOTE — ASSESSMENT & PLAN NOTE
Chronic, ongoing.  Continues to follow with gastroenterology at [facility].  She had an EGD and colonoscopy in November 2021, EGD showed intestinal metaplasia type I and it is recommended for patient to continue lifelong omeprazole 20 mg daily.  She should repeat EGD after 3 years.  Requesting medication refill.

## 2023-02-08 NOTE — PROGRESS NOTES
CC: Diagnoses of Class 2 severe obesity due to excess calories with serious comorbidity and body mass index (BMI) of 36.0 to 36.9 in adult (HCC), Other epilepsy without status epilepticus, not intractable (HCC), Memory loss of unknown cause, Acute cystitis without hematuria, Mixed stress and urge urinary incontinence, Dyslipidemia, Vitamin B12 deficiency, Vitamin D deficiency, Gastric intestinal metaplasia, Osteopenia of spine, Other problems related to lifestyle, and Need for hepatitis C screening test were pertinent to this visit.                                                                                                                                       HPI:   Nancie presents today with the following concerns:    Osteopenia of spine  Chronic, ongoing.  Last DEXA scan completed in 2019, declines repeat screening.  Continues vitamin D supplement.  Does not engage in weightbearing exercise.    Memory loss of unknown cause  New to examiner.  Patient reports that about 1 week ago the patient had an episode that he is not sure if it was a seizure or not.  The nurse at the [facility] reported that the patient may be had a 20 second seizure.  She has continued phenytoin for epilepsy, but is not currently followed by neurology.  She also reports some urinary incontinence that is worsening.    Dyslipidemia  Chronic, ongoing without medication, declined starting.  Due for updated annual labs.    Epilepsy  Chronic, ongoing.  Continues phenytoin  mg nightly.  She was following with neurology, but states that her neurologist left the practice.  She has been referred to neurology, but has not established.    Gastric intestinal metaplasia  Chronic, ongoing.  Continues to follow with gastroenterology at [facility].  She had an EGD and colonoscopy in November 2021, EGD showed intestinal metaplasia type I and it is recommended for patient to continue lifelong omeprazole 20 mg daily.  She should  repeat EGD after 3 years.  Requesting medication refill.    UI (urinary incontinence)  Chronic, ongoing and worsening.  Did have a possible 20 second seizure about 1 week ago.    Class 2 severe obesity due to excess calories with serious comorbidity and body mass index (BMI) of 36.0 to 36.9 in adult ([facility])  Chronic, ongoing.  Activity/exercise is limited due to chronic bilateral knee and foot pain.  She has tried working on decreasing portion sizes.  Due for updated annual labs.    Patient Active Problem List    Diagnosis Date Noted    Memory loss of unknown cause 02/02/2023    S/P total knee arthroplasty, right 05/10/2022    Osteoarthritis of right knee 02/14/2022    Gastric intestinal metaplasia 12/07/2021    Other constipation 07/15/2021    Risk for falls 07/27/2020    Chronic foot pain, right 07/27/2020    Changing nevus 03/28/2019    Chronic bilateral low back pain without sciatica 03/28/2019    Dyslipidemia 03/28/2019    Class 2 severe obesity due to excess calories with serious comorbidity and body mass index (BMI) of 36.0 to 36.9 in adult ([facility]) 03/28/2019    Chronic pain of both knees 10/10/2017    Osteopenia of spine 08/01/2016    Vitamin B12 deficiency 08/01/2016    Varicose veins of both lower extremities with pain 08/01/2016    Seasonal allergies 08/01/2016    UI (urinary incontinence) 08/01/2016    Vitamin D deficiency 08/01/2016    Epilepsy ([facility]) 08/01/2016     Current Outpatient Medications   Medication Sig Dispense Refill    omeprazole (PRILOSEC) 20 MG delayed-release capsule Take 1 Capsule by mouth every morning before breakfast. 90 Capsule 3    phenytoin ER (DILANTIN) 100 MG Cap TAKE 5 CAPSULES NIGHTLY (NEED APPOINTMENT FOR FURTHER REFILLS) 450 Capsule 3    fluticasone (FLONASE) 50 MCG/ACT nasal spray USE 1 SPRAY IN EACH NOSTRIL EVERY DAY (60 DAYS SUPPLY) 16 g 0    Azelastine HCl 137 MCG/SPRAY Solution as needed.      cetirizine (ZYRTEC) 10 MG Tab Take 1 Tablet by mouth every day. 90 Tablet 3     "Calcium-Magnesium-Vitamin D (CALCIUM 500 PO) Take  by mouth.      Multiple Vitamin (MULTI-VITAMINS PO) Take  by mouth.      Cholecalciferol (VITAMIN D PO) Take  by mouth.      Cyanocobalamin (VITAMIN B12 PO) Take  by mouth.      penicillin v potassium (VEETID) 500 MG Tab Take 1 Tablet by mouth 3 times a day for 10 days. Take one hour before meals or two hours after meals. 30 Tablet 0     No current facility-administered medications for this visit.     Allergies as of 02/02/2023    (No Known Allergies)      ROS:  See HPI    /62 (BP Location: Left arm, Patient Position: Sitting, BP Cuff Size: Large adult)   Pulse (!) 102   Temp 36.9 °C (98.4 °F) (Temporal)   Ht 1.6 m (5' 3\")   Wt 92.5 kg (204 lb)   SpO2 94%   BMI 36.14 kg/m²     Physical Exam:  General: Well nourished, well developed female in NAD, awake and conversant.  Eyes: Normal conjunctiva, anicteric.  Round symmetrical pupils.  ENT: Hearing grossly intact.  No nasal discharge.  Neck: Neck is supple.  No masses or thyromegaly.  CV: No lower extremity edema.  Respiratory: Respirations are nonlabored.  No wheezing.  Abdomen: Non-Distended.  Skin: Warm.  No rashes or ulcers.  MSK: Normal ambulation.  No clubbing or cyanosis.  Neuro: Sensation and CN II-XII grossly normal.  Psych: Alert and oriented.  Cooperative, appropriate mood and affect, normal judgment.      Assessment and Plan.   77 y.o. female with the following issues:  1. Class 2 severe obesity due to excess calories with serious comorbidity and body mass index (BMI) of 36.0 to 36.9 in adult (HCC)  Chronic, ongoing.  Encourage diet high in fruits, vegetables, and fiber. And a diet low in salt, refined carbohydrates, cholesterol, saturated fat, and trans fatty acids.    Encourage a minimum of 30 minutes of moderate intensity aerobic exercise (eg, brisk walking) is recommended on five days each week. Or 30 minutes of vigorous-intensity aerobic exercise (eg, jogging) on three days each week. "   Patient's body mass index is 36.14 kg/m². Exercise and nutrition counseling were performed at this visit.  Due for updated annual labs prior to follow-up.    2. Other epilepsy without status epilepticus, not intractable (HCC)  Chronic, ongoing.  Referral to neurology.  Due for updated labs, plan to check Dilantin level.  Patient does not need refill of Dilantin, continue 500 mg nightly as directed by previous neurologist.  - Referral to Neurology  - DILANTIN; Future    3. Memory loss of unknown cause  New to examiner.  Referral to neurology.  Due for updated labs prior to follow-up.  POCT urinalysis completed due to confusion/memory loss.  - Referral to Neurology  - VITAMIN B12; Future  - CBC WITH DIFFERENTIAL; Future  - Comp Metabolic Panel; Future  - TSH; Future  - VITAMIN D,25 HYDROXY (DEFICIENCY); Future  - DILANTIN; Future  - POCT Urinalysis    4. Acute cystitis without hematuria  New problem, abnormal urine dipstick in office. Urine sent for culture, will notify patient of results through [place]. Start Bactrim -160 mg twice daily for 3 days, patient educated to complete entire course of antibiotics even if symptoms improve/resolve.  Provided education to drink plenty of fluids, wipe front to back every void and bowel movement.   Return to clinic if symptoms not improving within 3-4 days or in case of vomiting, fever, increasing pain.   - URINE CULTURE(NEW); Future  - sulfamethoxazole-trimethoprim (BACTRIM DS) 800-160 MG tablet; Take 1 Tablet by mouth 2 times a day for 3 days.  Dispense: 6 Tablet; Refill: 0    5. Mixed stress and urge urinary incontinence  Chronic, ongoing.  Patient is prescribed Bactrim DS twice daily for 3 days for acute cystitis without hematuria.  Will notify of culture when received.  Referral to urogynecology to discuss treatment options for incontinence.  - POCT Urinalysis  - URINE CULTURE(NEW); Future  - Referral to Urogynecology    6. Dyslipidemia  Chronic, ongoing without  medication, declined starting.  Due for updated labs prior to follow-up.  - Comp Metabolic Panel; Future  - TSH; Future  - Lipid Profile; Future    7. Vitamin B12 deficiency  Due for updated labs prior to follow-up.  - VITAMIN B12; Future  - CBC WITH DIFFERENTIAL; Future    8. Vitamin D deficiency  Due for updated labs prior to follow-up.  - VITAMIN D,25 HYDROXY (DEFICIENCY); Future    9. Gastric intestinal metaplasia  Chronic, ongoing.  Continue omeprazole 20 mg daily, refill sent to pharmacy.  Continue to follow with gastroenterology.  Due for repeat EGD in 2024.  - omeprazole (PRILOSEC) 20 MG delayed-release capsule; Take 1 Capsule by mouth every morning before breakfast.  Dispense: 90 Capsule; Refill: 3    10. Osteopenia of spine  Chronic, ongoing, declines updating DEXA.  Encourage weightbearing exercise 150 minutes/week.  Due for updated labs prior to follow-up.  - VITAMIN D,25 HYDROXY (DEFICIENCY); Future    11. Other problems related to lifestyle  12. Need for hepatitis C screening test  Due for one-time screening.  - HEP C VIRUS ANTIBODY; Future     Return in about 3 weeks (around 2/23/2023) for AWV, Follow up Labs.     I have placed the below orders and discussed them with an approved delegating provider. The MA is performing the below orders under the direction of Dr. Rao.      Please note that this dictation was created using voice recognition software. I have worked with consultants from the vendor as well as technical experts from [facility] [employer] to optimize the interface. I have made every reasonable attempt to correct obvious errors, but I expect that there are errors of grammar and possibly content that I did not discover before finalizing the note.

## 2023-02-08 NOTE — ASSESSMENT & PLAN NOTE
Chronic, ongoing.  Activity/exercise is limited due to chronic bilateral knee and foot pain.  She has tried working on decreasing portion sizes.  Due for updated annual labs.

## 2023-02-14 ENCOUNTER — TELEPHONE (OUTPATIENT)
Dept: HEALTH INFORMATION MANAGEMENT | Facility: OTHER | Age: 78
End: 2023-02-14
Payer: MEDICARE

## 2023-02-15 ENCOUNTER — OFFICE VISIT (OUTPATIENT)
Dept: NEUROLOGY | Facility: MEDICAL CENTER | Age: 78
End: 2023-02-15
Attending: PSYCHIATRY & NEUROLOGY
Payer: MEDICARE

## 2023-02-15 VITALS
TEMPERATURE: 97.3 F | SYSTOLIC BLOOD PRESSURE: 130 MMHG | BODY MASS INDEX: 35.9 KG/M2 | HEART RATE: 106 BPM | HEIGHT: 63 IN | DIASTOLIC BLOOD PRESSURE: 62 MMHG | OXYGEN SATURATION: 91 % | WEIGHT: 202.6 LBS

## 2023-02-15 DIAGNOSIS — G40.209 COMPLEX PARTIAL SEIZURES EVOLVING TO GENERALIZED TONIC-CLONIC SEIZURES (HCC): ICD-10-CM

## 2023-02-15 DIAGNOSIS — G62.9 SENSORY NEUROPATHY: ICD-10-CM

## 2023-02-15 DIAGNOSIS — R27.0 ATAXIA: ICD-10-CM

## 2023-02-15 PROCEDURE — 99215 OFFICE O/P EST HI 40 MIN: CPT | Performed by: PSYCHIATRY & NEUROLOGY

## 2023-02-15 PROCEDURE — 99212 OFFICE O/P EST SF 10 MIN: CPT | Performed by: PSYCHIATRY & NEUROLOGY

## 2023-02-15 ASSESSMENT — ENCOUNTER SYMPTOMS
MEMORY LOSS: 0
LOSS OF CONSCIOUSNESS: 0
SEIZURES: 0
INSOMNIA: 0
FOCAL WEAKNESS: 0
FALLS: 1

## 2023-02-15 ASSESSMENT — FIBROSIS 4 INDEX: FIB4 SCORE: 2.03

## 2023-02-16 ENCOUNTER — TELEPHONE (OUTPATIENT)
Dept: MEDICAL GROUP | Facility: PHYSICIAN GROUP | Age: 78
End: 2023-02-16
Payer: MEDICARE

## 2023-02-16 NOTE — TELEPHONE ENCOUNTER
Future Appointments         Provider Department Center    2/23/2023 7:00 AM (Arrive by 6:45 AM) MOUNA Lopez [facility] [place] VIS    4/11/2023 2:00 PM EMG PROVIDER; [facility] LAB [facility]     5/10/2023 7:00 AM Jam Lowe M.D. [facility]    7/17/2023 8:40 AM (Arrive by 8:25 AM) Miki Fofana M.D. [facility]           ANNUAL WELLNESS VISIT PRE-VISIT PLANNING    1.  Reviewed notes from the last office visit: Yes, LOV 02/02/2023    2.  If any orders were ordered or intended to be done prior to visit (i.e. 6 mos follow-up), do we have results/consult notes or has patient scheduled?          Labs - Labs ordered, completed on 02/06/2023 and results are in chart.  Note: If patient appointment is for lab review and patient did not complete labs, check with provider if OK to reschedule patient until labs completed.         Imaging - Imaging was not ordered at last office visit.         Referrals - Referral ordered, patient was seen and consult notes are in chart. Care Teams updated  YES.    3.  Immunizations were updated in Epic using Reconcile Outside Information activity? Yes         Is patient due for Tdap? NO         Is patient due for Shingrix? NO    4.  Patient is due for the following Health Maintenance Topics:   Health Maintenance Due   Topic Date Due    Annual Wellness Visit  07/28/2021       - Patient already has appointment scheduled for Annual Wellness Visit (AWV).    5.  Reviewed/Updated the following with patient:          Preferred Pharmacy? Yes          Preferred Lab? Yes          Preferred Communication? Yes          Allergies? Yes          Medications? YES. Was Abstract Encounter opened and chart updated? YES          Social History? Yes          Family History (document living status of immediate family members and if + hx of  cancer, diabetes, hypertension, hyperlipidemia, heart attack, stroke) Yes    6.  Care Team Updated:          DME  Company (gait device, O2, CPAP, etc.): NO          Other Specialists (eye doctor, derm, GYN, cardiology, endo, etc): YES    7.  Patient was advised: “This is a free wellness visit. The provider will screen for medical conditions to help you stay healthy. If you have other concerns to address you may be asked to discuss these at a separate visit or there may be an additional fee.”     8.  AHA ([employer]uls[employer]) form printed for Provider? N/A  Pt was reminded of her check in time

## 2023-02-16 NOTE — PROGRESS NOTES
"Subjective     Nancie Joyner is a 77 y.o. female who presents with her  Cristhian, last seen in this clinic just under 2 years ago by DEBBIE Finney, with a history of seizures and progressive gait ataxia.  History is gotten from her  as well as from the patient herself, along with review of the EHR.    GLENN Canada has had good control of her seizures, still on a high dose of Dilantin 500 mg every evening.  Drug levels have historically been between 9-11.  The seizures are typified by a sudden dizziness and what she describes as a \"electrical\" jittery sensation.  Cristhian witnesses her to be staring and altered and unresponsive.  She is not tremulous.  This last event occurred several years ago.  There were mild postictal changes but she would always recover back to baseline.  She would have some headache and was tired.    Review of records that her last generalized seizure was more than 13 years ago, occurring in 2010.  Her seizures started in her 40s, her first and only generalized seizure occurring at the time.  Initially her EEG was normal, her most recent study from March 13, 2018 revealed right temporal susceptibility.  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    She has been stable on her Dilantin, she states she tolerates the drug well.  She sees the dentist twice a year, there have been no gingival issues.  Her last Dilantin level from February 26, 2023 was 27.9!  (Of note, she insists that she was compliant with medicine, had not taken any new medicines at that time the level was drawn.) B12 and folate levels have been normal.    The gait ataxia has been a long and slow process.  She describes it as a wobbliness moving from side to side.  She denies " difficulties with movement initiation, shortness stride length, neither of them recognize shuffling.  There is no appendicular incoordination with the legs or hands and arms.  She denies weakness in the extremities.  She does recognize a constant tingling and numbness in the feet, this has not ascended.  There is no analgesia.  The balance is distorted whether or not it is light or dark out, uneven surfaces present no more of a problem than if she is walking on something flat.    Notes from her last office visit here in March, 2021, acknowledge the balance difficulties.  Examination was remarkable for diminished reflexes throughout, but a stocking pattern decrement of pinprick and vibration in the lower extremities distal to the knees, slight curtailment of JPS.  Tandem walking was poor, but otherwise there were no notable gait distortions.    Medical, surgical and family histories are reviewed, there are no new drug allergies.  She has a history of mild developmental delay with slight cognitive impairment.  There is no history of CVA, diabetes, hypertension, liver or kidney disease, blood dyscrasia, malignancy, or thyroid disease.  There is no surgical history of note.  There are several family members with a history of seizures, no one with a history of neurodegenerative disease or cerebrovascular disease.  There is no tobacco or alcohol use history of note.    She is on Dilantin 500 mg nightly, Zyrtec, Flonase, vitamin D and B12 supplement, calcium with vitamin D.    Review of Systems   Constitutional:  Negative for malaise/fatigue.   Musculoskeletal:  Positive for falls.   Neurological:  Negative for focal weakness, seizures and loss of consciousness.   Psychiatric/Behavioral:  Negative for memory loss. The patient does not have insomnia.    All other systems reviewed and are negative.    Objective     /62 (BP Location: Right arm, Patient Position: Sitting, BP Cuff Size: Adult)   Pulse (!) 106   Temp  "36.3 °C (97.3 °F) (Temporal)   Ht 1.6 m (5' 3\")   Wt 91.9 kg (202 lb 9.6 oz)   SpO2 91%   BMI 35.89 kg/m²      Physical Exam    She appears in no acute distress.  Vital signs are stable.  There is no malar rash, jaw or temporal tenderness.  There is no gingival hyperplasia.  Dentition is in good shape, there is no periodontal disease.  The neck is supple, range of motion is full.  Cardiac evaluation is unremarkable.  There is no evidence of rash.     Neurological Exam    She is fully oriented, there is no aphasia, apraxia, or inattention.    PERRLA/EOMI, there are no nystagmus, visual fields are full.  There is slight dysarthria, cerebellar in quality, but there is no hypophonia or bradykinesia.  The tongue and uvula are midline, there is no bulbar dysfunction.  Sensory exam is intact to temperature and light touch.  Shoulder shrug and head rotation are normal.    Musculoskeletal exam reveals normal tone, there is no tremor, asterixis, or drift.  Strength is intact throughout.    Reflexes are absent at the ankles, only trace present at the knees, they are easily elicited and symmetric in the upper extremities.  Both toes are downgoing.    She is quite unsteady as she walks, station is widened, tandem walking is an impossibility.  Even standing on her toes and heels requires assistance.  Still, there is no appendicular dystaxia.  Repetitive movements are symmetric throughout, slightly slowed in frequency but normal in amplitude.    Sensory exam reveals a stocking pattern decrement of vibration below the knees, JPS is intact, temperature is diminished in the feet.  Romberg is absent.    Assessment & Plan     1. Complex partial seizures evolving to generalized tonic-clonic seizures (HCC)  Her seizures are stable, but I am concerned about what looks like is cerebellar, vermian atrophy seen on her original MRI scan.  I will reimage to get a better look, this may be playing a major role for the balance difficulties " she is having.    I am a little concerned about the Dilantin level that she had checked recently, why it was that high remains to be seen.  Previous levels in the past were drawn at the same time, and these were consistent in the low normal range.  500 mg of Dilantin taken at once does result in imperfect absorption, typically doses less than 400 mg are more consistent.  We may need to split the dose if levels continue to fluctuate widely.  Fortunately she has had no seizures.  A repeat level will be checked.  They know to contact the office for any seizure recurrences.    - MR-BRAIN-W/O; Future    2. Ataxia  Multifactoral, I think the Dilantin has had a rather significant effect on the cerebellar midline structures.  She also has signs and symptoms suggestive of a sensory neuropathy, but also lacks a history of typical risk.  Some additional blood work will be ordered.  She may require Gait training, she is already using a cane when necessary, but we may need to talk about changing her AED moving forwards.    - MR-BRAIN-W/O; Future  - Referral to Neurodiagnostics (EEG,EP,EMG/NCS/DBS)  - Sed Rate; Future  - DILANTIN; Future  - SPEP W/REFLEX TO BELTRAN, A, G, M; Future  - CLAUDETTE COMPREHENSIVE PANEL    3. Sensory neuropathy  Something unexpected, neurophysiologic studies should be done to better characterize the nature of the neuropathy and then help us pursue causation.  This is to a degree contributing to the balance difficulties she is having, hopefully we might find a condition that is treatable.  Face-to-face time was spent talking about all of the above with the patient and her , we will follow-up after the diagnostics are completed, contacting the patient earlier if needed for further direction.    - Referral to Neurodiagnostics (EEG,EP,EMG/NCS/DBS)  - Sed Rate; Future  - DILANTIN; Future  - SPEP W/REFLEX TO BELTRAN, A, G, M; Future  - CLAUDETTE COMPREHENSIVE PANEL    Time: 40 minutes in total spent on patient care  including precharting, record review, discussion with healthcare staff and documentation.  This includes face-to-face time for exam, review, discussion, as well as counseling and coordinating care.

## 2023-02-21 DIAGNOSIS — J30.2 SEASONAL ALLERGIES: ICD-10-CM

## 2023-02-21 RX ORDER — FLUTICASONE PROPIONATE 50 MCG
SPRAY, SUSPENSION (ML) NASAL
Qty: 16 G | Refills: 5 | Status: SHIPPED | OUTPATIENT
Start: 2023-02-21 | End: 2024-01-23

## 2023-02-21 NOTE — TELEPHONE ENCOUNTER
Received request via: Pharmacy    Was the patient seen in the last year in this department? Yes    Does the patient have an active prescription (recently filled or refills available) for medication(s) requested? No  Requested from [facility]    Does the patient have intermediate Plus and need 100 day supply (blood pressure, diabetes and cholesterol meds only)? Patient does not have SCP

## 2023-02-22 NOTE — TELEPHONE ENCOUNTER
Requested Prescriptions     Pending Prescriptions Disp Refills   • fluticasone (FLONASE) 50 MCG/ACT nasal spray 16 g 5     Sig: USE 1 SPRAY IN EACH NOSTRIL EVERY DAY (60 DAYS SUPPLY)       HUSSEIN Lopez.

## 2023-02-23 ENCOUNTER — OFFICE VISIT (OUTPATIENT)
Dept: MEDICAL GROUP | Facility: PHYSICIAN GROUP | Age: 78
End: 2023-02-23
Payer: MEDICARE

## 2023-02-23 ENCOUNTER — HOSPITAL ENCOUNTER (OUTPATIENT)
Dept: LAB | Facility: MEDICAL CENTER | Age: 78
End: 2023-02-23
Attending: PSYCHIATRY & NEUROLOGY
Payer: MEDICARE

## 2023-02-23 VITALS
HEIGHT: 63 IN | OXYGEN SATURATION: 92 % | SYSTOLIC BLOOD PRESSURE: 112 MMHG | WEIGHT: 202 LBS | DIASTOLIC BLOOD PRESSURE: 60 MMHG | TEMPERATURE: 97.4 F | BODY MASS INDEX: 35.79 KG/M2 | HEART RATE: 84 BPM | RESPIRATION RATE: 16 BRPM

## 2023-02-23 DIAGNOSIS — N39.46 MIXED STRESS AND URGE URINARY INCONTINENCE: ICD-10-CM

## 2023-02-23 DIAGNOSIS — G40.209 COMPLEX PARTIAL SEIZURES EVOLVING TO GENERALIZED TONIC-CLONIC SEIZURES (HCC): ICD-10-CM

## 2023-02-23 DIAGNOSIS — E66.01 CLASS 2 SEVERE OBESITY DUE TO EXCESS CALORIES WITH SERIOUS COMORBIDITY AND BODY MASS INDEX (BMI) OF 35.0 TO 35.9 IN ADULT (HCC): ICD-10-CM

## 2023-02-23 DIAGNOSIS — G62.9 SENSORY NEUROPATHY: ICD-10-CM

## 2023-02-23 DIAGNOSIS — E53.8 VITAMIN B12 DEFICIENCY: ICD-10-CM

## 2023-02-23 DIAGNOSIS — Z00.00 MEDICARE ANNUAL WELLNESS VISIT, SUBSEQUENT: ICD-10-CM

## 2023-02-23 DIAGNOSIS — E55.9 VITAMIN D DEFICIENCY: ICD-10-CM

## 2023-02-23 DIAGNOSIS — K59.09 OTHER CONSTIPATION: ICD-10-CM

## 2023-02-23 DIAGNOSIS — E78.5 DYSLIPIDEMIA: ICD-10-CM

## 2023-02-23 DIAGNOSIS — M85.88 OSTEOPENIA OF SPINE: ICD-10-CM

## 2023-02-23 DIAGNOSIS — R27.0 ATAXIA: ICD-10-CM

## 2023-02-23 LAB
ERYTHROCYTE [SEDIMENTATION RATE] IN BLOOD BY WESTERGREN METHOD: 7 MM/HOUR (ref 0–25)
PHENYTOIN SERPL-MCNC: 20.7 UG/ML (ref 10–20)

## 2023-02-23 PROCEDURE — 36415 COLL VENOUS BLD VENIPUNCTURE: CPT

## 2023-02-23 PROCEDURE — G0439 PPPS, SUBSEQ VISIT: HCPCS | Performed by: NURSE PRACTITIONER

## 2023-02-23 PROCEDURE — 84155 ASSAY OF PROTEIN SERUM: CPT

## 2023-02-23 PROCEDURE — 84165 PROTEIN E-PHORESIS SERUM: CPT

## 2023-02-23 PROCEDURE — 80185 ASSAY OF PHENYTOIN TOTAL: CPT

## 2023-02-23 PROCEDURE — 86225 DNA ANTIBODY NATIVE: CPT

## 2023-02-23 PROCEDURE — 85652 RBC SED RATE AUTOMATED: CPT

## 2023-02-23 PROCEDURE — 86235 NUCLEAR ANTIGEN ANTIBODY: CPT

## 2023-02-23 PROCEDURE — 83516 IMMUNOASSAY NONANTIBODY: CPT

## 2023-02-23 ASSESSMENT — PATIENT HEALTH QUESTIONNAIRE - PHQ9: CLINICAL INTERPRETATION OF PHQ2 SCORE: 0

## 2023-02-23 ASSESSMENT — FIBROSIS 4 INDEX: FIB4 SCORE: 2.03

## 2023-02-23 ASSESSMENT — ENCOUNTER SYMPTOMS: GENERAL WELL-BEING: POOR

## 2023-02-23 ASSESSMENT — ACTIVITIES OF DAILY LIVING (ADL): BATHING_REQUIRES_ASSISTANCE: 0

## 2023-02-23 NOTE — PROGRESS NOTES
Chief Complaint   Patient presents with    Annual Wellness Visit    Lab Results     HPI:  Nancie Joyner is a 77 y.o. here for Medicare Annual Wellness Visit     Class 2 severe obesity due to excess calories with serious comorbidity and body mass index (BMI) of 35.0 to 35.9 in adult (HCC)  Chronic, ongoing. Reports that she does not exercise due to chronic bilateral knee and foot pain, she also feels tired after walking for short distances. She does not eat a lot of fried or fatty foods. She was eating a lot of bread at the [employer], which she is cutting back on. She has a chair peddler that she is not using, but plans to try it out. She has access to a heated indoor pool where she lives, but does not use it because she doesn't know how to swim. Plans to cut down on her candy intake. Due for annual labs in February 2024.    Dyslipidemia  Chronic, improving without medication. Declines starting statins. Due for annual labs in February 2024.  The 10-year ASCVD risk score (Valery DK, et al., 2019) is: 19.8%     Complex partial seizures evolving to generalized tonic-clonic seizures (HCC)  Chronic, ongoing. Following with Dr. Fofana, neurology. Continues phenytoin  mg nightly. Recent levels elevated. Will be completing labs and brain MRI prior to follow up with Dr. Fofana. Also scheduled for EMG due to sensory neuropathy of bilateral lower extremities.    Osteopenia of spine  Chronic, ongoing.  Last DEXA scan completed in 2019, declines repeat screening.  Continues vitamin D supplement.  Does not engage in weightbearing exercise.  Due for annual labs in February 2024.    Other constipation  Chronic, ongoing. Continues OTC miralax about 5 nights per week. Denies blood in stools, nausea, vomiting.    UI (urinary incontinence)  Chronic, ongoing. Has been referred to urogynecology, has not been able to schedule as the office is not returning their calls.     Vitamin B12 deficiency  Chronic, stable.  Continues OTC vitamin B12 supplement daily.  Due for annual labs in February 2024.    Vitamin D deficiency  Chronic, stable. Continues OTC vitamin d supplement daily.  Due for annual labs in February 2024.     Patient Active Problem List    Diagnosis Date Noted    Ataxia 02/15/2023    Sensory neuropathy 02/15/2023    Memory loss of unknown cause 02/02/2023    S/P total knee arthroplasty, right 05/10/2022    Osteoarthritis of right knee 02/14/2022    Gastric intestinal metaplasia 12/07/2021    Other constipation 07/15/2021    Risk for falls 07/27/2020    Chronic foot pain, right 07/27/2020    Changing nevus 03/28/2019    Chronic bilateral low back pain without sciatica 03/28/2019    Dyslipidemia 03/28/2019    Class 2 severe obesity due to excess calories with serious comorbidity and body mass index (BMI) of 35.0 to 35.9 in adult (HCC) 03/28/2019    Chronic pain of both knees 10/10/2017    Osteopenia of spine 08/01/2016    Vitamin B12 deficiency 08/01/2016    Varicose veins of both lower extremities with pain 08/01/2016    Seasonal allergies 08/01/2016    UI (urinary incontinence) 08/01/2016    Vitamin D deficiency 08/01/2016    Complex partial seizures evolving to generalized tonic-clonic seizures (HCC) 08/01/2016     Current Outpatient Medications   Medication Sig Dispense Refill    fluticasone (FLONASE) 50 MCG/ACT nasal spray USE 1 SPRAY IN EACH NOSTRIL EVERY DAY (60 DAYS SUPPLY) 16 g 5    omeprazole (PRILOSEC) 20 MG delayed-release capsule Take 1 Capsule by mouth every morning before breakfast. 90 Capsule 3    phenytoin ER (DILANTIN) 100 MG Cap TAKE 5 CAPSULES NIGHTLY (NEED APPOINTMENT FOR FURTHER REFILLS) 450 Capsule 3    Azelastine HCl 137 MCG/SPRAY Solution as needed.      cetirizine (ZYRTEC) 10 MG Tab Take 1 Tablet by mouth every day. 90 Tablet 3    Calcium-Magnesium-Vitamin D (CALCIUM 500 PO) Take  by mouth.      Multiple Vitamin (MULTI-VITAMINS PO) Take  by mouth.      Cholecalciferol (VITAMIN D PO) Take  by  mouth.      Cyanocobalamin (VITAMIN B12 PO) Take  by mouth.       No current facility-administered medications for this visit.          Current supplements as per medication list.     Allergies: Patient has no known allergies.    Current social contact/activities: [facility]es to the [facility] 5 days a week, does puzzles, card games, exercises.    She  reports that she quit smoking about 43 years ago. Her smoking use included cigarettes. She started smoking about 46 years ago. She has a 1.00 pack-year smoking history. She has never used smokeless tobacco. She reports that she does not drink alcohol and does not use drugs.  Counseling given: Yes    ROS:    Gait: Uses no assistive device  Ostomy: No  Other tubes: No  Amputations: No  Chronic oxygen use: No  Last eye exam: 2020  Wears hearing aids: No   : Reports urinary leakage during the last 6 months that has somewhat interfered with their daily activities or sleep.    Screening:    Depression Screening  Little interest or pleasure in doing things?  0 - not at all  Feeling down, depressed , or hopeless? 0 - not at all  Patient Health Questionnaire Score: 0     If depressive symptoms identified deferred to follow up visit unless specifically addressed in assessment and plan.    Interpretation of PHQ-9 Total Score   Score Severity   1-4 No Depression   5-9 Mild Depression   10-14 Moderate Depression   15-19 Moderately Severe Depression   20-27 Severe Depression    Screening for Cognitive Impairment  Three Minute Recall (daughter, heaven, mountain) 0/3    [place] clock face with all 12 numbers and set the hands to show 10 past 11.  Yes    Cognitive concerns identified deferred for follow up unless specifically addressed in assessment and plan.    Fall Risk Assessment  Has the patient had two or more falls in the last year or any fall with injury in the last year?  No    Safety Assessment  Throw rugs on floor.  No  Handrails on all stairs.  Yes  Good lighting in all  hallways.  Yes  Difficulty hearing.  Yes  Patient counseled about all safety risks that were identified.    Functional Assessment ADLs  Are there any barriers preventing you from cooking for yourself or meeting nutritional needs?  No.    Are there any barriers preventing you from driving safely or obtaining transportation?  No.    Are there any barriers preventing you from using a telephone or calling for help?  No.    Are there any barriers preventing you from shopping?  No.    Are there any barriers preventing you from taking care of your own finances?  No.    Are there any barriers preventing you from managing your medications?  No.    Are there any barriers preventing you from showering, bathing or dressing yourself?  No.    Are you currently engaging in any exercise or physical activity?  No.     What is your perception of your health?  Poor    Advance Care Planning  Do you have an Advance Directive, Living Will, Durable Power of , or POLST? Yes    Living Will     is on file    Health Maintenance Summary            Postponed - BONE DENSITY (Every 2 Years) Postponed until 2/2/2024 08/06/2020  DS-BONE DENSITY STUDY (DEXA)    04/04/2019  DS-BONE DENSITY STUDY (DEXA)    03/03/2017  DS-BONE DENSITY STUDY (DEXA)    11/07/2005  DS-BONE DENSITY STUDY (DEXA)              Annual Wellness Visit (Every 366 Days) Next due on 2/24/2024 02/23/2023  Visit Dx: Medicare annual wellness visit, subsequent    02/23/2023  Subsequent Annual Wellness Visit - Includes PPPS ()    07/27/2020  Initial Annual Wellness Visit - Includes PPPS ()    07/27/2020  Visit Dx: Medicare annual wellness visit, initial              IMM DTaP/Tdap/Td Vaccine (3 - Td or Tdap) Next due on 8/6/2030 08/06/2020  Imm Admin: Tdap Vaccine    07/31/2020  Imm Admin: Tdap Vaccine              IMM PNEUMOCOCCAL VACCINE: 65+ Years (Series Information) Completed      03/28/2019  Imm Admin: Pneumococcal polysaccharide vaccine (PPSV-23)     11/30/2016  Imm Admin: Pneumococcal Conjugate Vaccine (Prevnar/PCV-13)              IMM ZOSTER VACCINES (Series Information) Completed      03/13/2020  Imm Admin: Zoster Vaccine Recombinant (RZV) (SHINGRIX)    11/12/2019  Imm Admin: Zoster Vaccine Recombinant (RZV) (SHINGRIX)    11/30/2016  Imm Admin: Zoster Vaccine Live (ZVL) (Zostavax) - HISTORICAL DATA              COVID-19 Vaccine (Series Information) Completed      09/08/2022  Imm Admin: PFIZER BIVALENT BOOSTER SARS-COV-2 VACCINE (12+)    06/15/2022  Imm Admin: PFIZER ORTIZ CAP SARS-COV-2 VACCINATION (12+)    09/30/2021  Imm Admin: PFIZER PURPLE CAP SARS-COV-2 VACCINATION (12+)    02/12/2021  Imm Admin: PFIZER PURPLE CAP SARS-COV-2 VACCINATION (12+)    01/22/2021  Imm Admin: PFIZER PURPLE CAP SARS-COV-2 VACCINATION (12+)              IMM INFLUENZA (Series Information) Completed      10/13/2022  Imm Admin: Influenza Vaccine Adult HD    10/07/2021  Imm Admin: Influenza Vaccine Adult HD    10/08/2020  Imm Admin: Influenza Vaccine Adult HD    10/03/2019  Imm Admin: Influenza Vaccine Adult HD    09/12/2018  Imm Admin: Influenza Vaccine Adult HD    Only the first 5 history entries have been loaded, but more history exists.              HEPATITIS C SCREENING  Completed      02/06/2023  HEP C VIRUS ANTIBODY              IMM HEP B VACCINE (Series Information) Aged Out      No completion history exists for this topic.              IMM MENINGOCOCCAL ACWY VACCINE (Series Information) Aged Out      No completion history exists for this topic.              Discontinued - MAMMOGRAM  Discontinued        Frequency changed to Never automatically (Topic No Longer Applies)    09/01/2021  MA-SCREENING MAMMO BILAT W/TOMOSYNTHESIS W/CAD    11/25/2019  MA-SCREENING MAMMO BILAT W/TOMOSYNTHESIS W/CAD    11/06/2018  MA-SCREENING MAMMO BILAT W/TOMOSYNTHESIS W/CAD    07/28/2016  MA-SCREEN MAMMO W/CAD-BILAT    Only the first 5 history entries have been loaded, but more history exists.               Discontinued - COLORECTAL CANCER SCREENING  Discontinued        Frequency changed to Never automatically (Topic No Longer Applies)    2021  COLONOSCOPY (Done)    2014  REFERRAL TO GI FOR COLONOSCOPY                  Patient Care Team:  MOUNA Lopez as PCP - General (Family Medicine)  Jam Lowe M.D. (Orthopedic Surgery)  Miki Fofana M.D. as Consulting Physician (Neurology)    Social History     Tobacco Use    Smoking status: Former     Packs/day: 0.50     Years: 2.00     Pack years: 1.00     Types: Cigarettes     Start date: 1977     Quit date: 3/28/1979     Years since quittin.9    Smokeless tobacco: Never   Vaping Use    Vaping Use: Never used   Substance Use Topics    Alcohol use: No     Alcohol/week: 0.0 oz    Drug use: No     Family History   Problem Relation Age of Onset    Cancer Mother         STOMACH    Lung Cancer Father     Cancer Sister         sinus    Alzheimer's Disease Sister     Heart Disease Sister     Cancer Sister     Heart Disease Sister     Breast Cancer Sister     Cancer Brother         pancreatic cancer    Seizures Paternal Aunt     No Known Problems Maternal Grandmother     No Known Problems Maternal Grandfather     No Known Problems Paternal Grandmother     No Known Problems Paternal Grandfather     Diabetes Daughter      She  has a past medical history of Arthritis, Basal cell carcinoma (), Breath shortness, CKD (chronic kidney disease) stage 3, GFR 30-59 ml/min ([facility]) (2016), Dental disorder, Epilepsy ([facility]) (2016), Heart burn, Osteopenia (2016), Pneumonia (), Seasonal allergies (2016), Seizure disorder ([facility]), UI (urinary incontinence) (2016), Varicose veins (2016), Vitamin B12 deficiency (2016), and Vitamin D deficiency (2016).   Past Surgical History:   Procedure Laterality Date    PB TOTAL KNEE ARTHROPLASTY Right 5/10/2022    Procedure: Right total knee arthroplasty;  Surgeon: Jam Lowe  "M.D.;  Location: SURGERY [facility];  Service: Orthopedics    CHOLECYSTECTOMY  1989    TUBAL COAGULATION LAPAROSCOPIC BILATERAL  1979    ABDOMINAL HYSTERECTOMY TOTAL      VEIN STRIPPING       Exam:   /60 (BP Location: Left arm, Patient Position: Sitting, BP Cuff Size: Large adult)   Pulse 84   Temp 36.3 °C (97.4 °F) (Temporal)   Resp 16   Ht 1.6 m (5' 3\")   Wt 91.6 kg (202 lb)   SpO2 92%  Body mass index is 35.78 kg/m².    Hearing good.    Dentition upper dentures  Alert, oriented in no acute distress.  Eye contact is good, speech goal directed, affect calm    General: Normal appearing. No distress.  HEENT: Normocephalic. Eyes conjunctiva clear lids without ptosis, pupils equal and reactive to light accommodation, ears normal shape and contour, canals are clear bilaterally, tympanic membranes are benign, nasal mucosa benign, oropharynx is without erythema, edema or exudates. Sinuses (frontal and maxillary) nontender to palpation.  Neck: Supple without JVD or bruit.   Pulmonary: Clear to ausculation.  Normal effort. No rales, rhonchi, or wheezing.  Cardiovascular: Regular rate and rhythm without murmur. Carotid and radial pulses are intact and equal bilaterally.  Abdomen: Soft, nontender, nondistended. Normal bowel sounds.   Neurologic: Grossly nonfocal.  Lymph: No cervical, supraclavicular or axillary lymph nodes are palpable.  Skin: Warm and dry.  No obvious lesions.  Musculoskeletal: Unsteady gait. No extremity cyanosis, clubbing, or edema.  Psych: Normal mood and affect. Alert and oriented x3. Judgment and insight is normal.     Assessment and Plan. The following treatment and monitoring plan is recommended:    1. Medicare annual wellness visit, subsequent  - Subsequent Annual Wellness Visit - Includes PPPS ()    2. Complex partial seizures evolving to generalized tonic-clonic seizures (HCC)  Chronic, ongoing.  Continue to follow with neurology, Dr. Fofana, next appointment 7/17/2023.  " Continue phenytoin  mg nightly.  Complete labs, brain MRI, and EMG prior to follow-up with neurology.    3. Class 2 severe obesity due to excess calories with serious comorbidity and body mass index (BMI) of 35.0 to 35.9 in adult (HCC)  Chronic, ongoing.  Encourage diet high in fruits, vegetables, and fiber. And a diet low in salt, refined carbohydrates, cholesterol, saturated fat, and trans fatty acids.    Encourage a minimum of 30 minutes of moderate intensity aerobic exercise (eg, brisk walking) is recommended on five days each week. Or 30 minutes of vigorous-intensity aerobic exercise (eg, jogging) on three days each week.   Patient's body mass index is 35.78 kg/m². Exercise and nutrition counseling were performed at this visit.  Due for updated annual labs in February 2024 prior to annual follow-up.    4. Dyslipidemia  Chronic, improving without medication.  Encourage healthy diet, regular exercise as tolerated, weight loss. Due for updated annual labs in February 2024 prior to annual follow-up.    5. Mixed stress and urge urinary incontinence  Chronic, ongoing.  Referral to urogynecology has been improved, attempted to contact office for patient, scheduled appointment 5/18/23.    6. Vitamin B12 deficiency  Chronic, ongoing.  Continue vitamin B12 supplement daily. Due for updated annual labs in February 2024 prior to annual follow-up.    7. Osteopenia of spine  Chronic, ongoing. Encourage patient to continue to take vitamin d and calcium supplement daily. Encourage a minimum of 150 minutes of weight bearing exercises weekly.  Declines updating DEXA scan.    8. Vitamin D deficiency  Chronic, ongoing.  Continue over-the-counter vitamin D supplement daily. Due for updated annual labs in February 2024 prior to annual follow-up.    9. Other constipation  Chronic, ongoing.  Continue over-the-counter MiraLAX once daily as needed.  Encouraged to increase fiber in water intake.    Services suggested: No services  needed at this time  Health Care Screening: Age-appropriate preventive services recommended by USPTF and ACIP covered by Medicare were discussed today. Services ordered if indicated and agreed upon by the patient.  Referrals offered: Community-based lifestyle interventions to reduce health risks and promote self-management and wellness, fall prevention, nutrition, physical activity, tobacco-use cessation, weight loss, and mental health services as per orders if indicated.    Discussion today about general wellness and lifestyle habits:    Prevent falls and reduce trip hazards; Cautioned about securing or removing rugs.  Have a working fire alarm and carbon monoxide detector;   Engage in regular physical activity and social activities     Follow-up: Return in about 1 year (around 2/23/2024) for AWV, Follow up Labs, As needed.    Please note that this dictation was created using voice recognition software. I have worked with consultants from the vendor as well as technical experts from [facility] [employer] to optimize the interface. I have made every reasonable attempt to correct obvious errors, but I expect that there are errors of grammar and possibly content that I did not discover before finalizing the note.

## 2023-02-23 NOTE — ASSESSMENT & PLAN NOTE
Chronic, stable. Continues OTC vitamin B12 supplement daily.  Due for annual labs in February 2024.

## 2023-02-23 NOTE — ASSESSMENT & PLAN NOTE
Chronic, improving without medication. Declines starting statins. Due for annual labs in February 2024.  The 10-year ASCVD risk score (Valery DK, et al., 2019) is: 19.8%

## 2023-02-23 NOTE — ASSESSMENT & PLAN NOTE
Chronic, ongoing. Following with Dr. Fofana, neurology. Continues phenytoin  mg nightly. Recent levels elevated. Will be completing labs and brain MRI prior to follow up with Dr. Fofana. Also scheduled for EMG due to sensory neuropathy of bilateral lower extremities.

## 2023-02-23 NOTE — ASSESSMENT & PLAN NOTE
Chronic, ongoing. Has been referred to urogynecology, has not been able to schedule as the office is not returning their calls.

## 2023-02-23 NOTE — ASSESSMENT & PLAN NOTE
Chronic, ongoing.  Last DEXA scan completed in 2019, declines repeat screening.  Continues vitamin D supplement.  Does not engage in weightbearing exercise.  Due for annual labs in February 2024.

## 2023-02-23 NOTE — ASSESSMENT & PLAN NOTE
Chronic, ongoing. Reports that she does not exercise due to chronic bilateral knee and foot pain, she also feels tired after walking for short distances. She does not eat a lot of fried or fatty foods. She was eating a lot of bread at the [employer], which she is cutting back on. She has a chair peddler that she is not using, but plans to try it out. She has access to a heated indoor pool where she lives, but does not use it because she doesn't know how to swim. Plans to cut down on her candy intake. Due for annual labs in February 2024.

## 2023-02-23 NOTE — ASSESSMENT & PLAN NOTE
Chronic, ongoing. Continues OTC miralax about 5 nights per week. Denies blood in stools, nausea, vomiting.

## 2023-02-24 LAB
CHROMATIN IGG SERPL-ACNC: 4 UNITS (ref 0–19)
DSDNA AB TITR SER CLIF: NORMAL {TITER}

## 2023-02-26 LAB
ALBUMIN SERPL ELPH-MCNC: 4.15 G/DL (ref 3.75–5.01)
ALPHA1 GLOB SERPL ELPH-MCNC: 0.28 G/DL (ref 0.19–0.46)
ALPHA2 GLOB SERPL ELPH-MCNC: 0.97 G/DL (ref 0.48–1.05)
B-GLOBULIN SERPL ELPH-MCNC: 0.76 G/DL (ref 0.48–1.1)
CENTROMERE IGG TITR SER IF: 0 AU/ML (ref 0–40)
ENA JO1 AB TITR SER: 0 AU/ML (ref 0–40)
ENA SCL70 IGG SER QL: 2 AU/ML (ref 0–40)
ENA SM IGG SER-ACNC: 0 AU/ML (ref 0–40)
ENA SS-B IGG SER IA-ACNC: 1 AU/ML (ref 0–40)
GAMMA GLOB SERPL ELPH-MCNC: 0.93 G/DL (ref 0.62–1.51)
INTERPRETATION SERPL IFE-IMP: NORMAL
MONOCLON BAND OBS SERPL: NORMAL
MONOCLONAL PROTEIN NL11656: NORMAL G/DL
PATHOLOGY STUDY: NORMAL
PROT SERPL-MCNC: 7.1 G/DL (ref 6.3–8.2)
SSA52 R0ENA AB IGG Q0420: 1 AU/ML (ref 0–40)
SSA60 R0ENA AB IGG Q0419: 1 AU/ML (ref 0–40)
U1 SNRNP IGG SER QL: 2 UNITS (ref 0–19)

## 2023-02-27 DIAGNOSIS — E55.9 VITAMIN D DEFICIENCY: ICD-10-CM

## 2023-02-27 DIAGNOSIS — Z00.00 ROUTINE HEALTH MAINTENANCE: ICD-10-CM

## 2023-02-27 DIAGNOSIS — E78.5 DYSLIPIDEMIA: ICD-10-CM

## 2023-02-27 DIAGNOSIS — E53.8 VITAMIN B12 DEFICIENCY: ICD-10-CM

## 2023-02-27 DIAGNOSIS — E66.01 CLASS 2 SEVERE OBESITY DUE TO EXCESS CALORIES WITH SERIOUS COMORBIDITY AND BODY MASS INDEX (BMI) OF 35.0 TO 35.9 IN ADULT (HCC): ICD-10-CM

## 2023-02-27 NOTE — PROGRESS NOTES
1. Routine health maintenance  - CBC WITH DIFFERENTIAL; Future  - Comp Metabolic Panel; Future  - Lipid Profile; Future  - TSH WITH REFLEX TO FT4; Future  - VITAMIN D,25 HYDROXY (DEFICIENCY); Future  - VITAMIN B12; Future    2. Class 2 severe obesity due to excess calories with serious comorbidity and body mass index (BMI) of 35.0 to 35.9 in adult (HCC)  - CBC WITH DIFFERENTIAL; Future  - Comp Metabolic Panel; Future  - Lipid Profile; Future  - TSH WITH REFLEX TO FT4; Future    3. Dyslipidemia  - Comp Metabolic Panel; Future  - Lipid Profile; Future  - TSH WITH REFLEX TO FT4; Future    4. Vitamin D deficiency  - VITAMIN D,25 HYDROXY (DEFICIENCY); Future    5. Vitamin B12 deficiency  - CBC WITH DIFFERENTIAL; Future  - VITAMIN B12; Future     Due for annual labs in February 2024 prior to annual follow-up.

## 2023-03-10 ENCOUNTER — HOSPITAL ENCOUNTER (OUTPATIENT)
Dept: RADIOLOGY | Facility: MEDICAL CENTER | Age: 78
End: 2023-03-10
Attending: PSYCHIATRY & NEUROLOGY
Payer: MEDICARE

## 2023-03-10 DIAGNOSIS — R27.0 ATAXIA: ICD-10-CM

## 2023-03-10 DIAGNOSIS — G40.209 COMPLEX PARTIAL SEIZURES EVOLVING TO GENERALIZED TONIC-CLONIC SEIZURES (HCC): ICD-10-CM

## 2023-03-10 PROCEDURE — 70551 MRI BRAIN STEM W/O DYE: CPT

## 2023-04-11 ENCOUNTER — NON-PROVIDER VISIT (OUTPATIENT)
Dept: NEUROLOGY | Facility: MEDICAL CENTER | Age: 78
End: 2023-04-11
Attending: NURSE PRACTITIONER
Payer: MEDICARE

## 2023-04-11 DIAGNOSIS — R27.0 ATAXIA: ICD-10-CM

## 2023-04-11 DIAGNOSIS — G62.9 SENSORY NEUROPATHY: ICD-10-CM

## 2023-04-11 PROCEDURE — 95909 NRV CNDJ TST 5-6 STUDIES: CPT | Performed by: SPECIALIST

## 2023-04-11 PROCEDURE — 95909 NRV CNDJ TST 5-6 STUDIES: CPT | Mod: 26 | Performed by: SPECIALIST

## 2023-04-11 PROCEDURE — 95886 MUSC TEST DONE W/N TEST COMP: CPT | Performed by: SPECIALIST

## 2023-04-11 PROCEDURE — 95886 MUSC TEST DONE W/N TEST COMP: CPT | Mod: 26 | Performed by: SPECIALIST

## 2023-04-11 NOTE — PROCEDURES
NERVE CONDUCTION STUDIES AND ELECTROMYOGRAPHY REPORT        04/11/23      Referring provider: MOUNA Lopez      SUMMARY OF PATIENT'S CLINICAL HISTORY,PHYSICAL EXAM, AND RATIONALE FOR TESTING:    Ms. Nancie Joyner 77 y.o. presenting with bilateral lower extremity numbness and paresthesias and gait ataxia in a patient on longstanding Dilantin for epilepsy.    Past Medical History is significant for :   Past Medical History:   Diagnosis Date    Arthritis     hands, knees, lower back    Basal cell carcinoma 2015    left eyelid    Breath shortness     CKD (chronic kidney disease) stage 3, GFR 30-59 ml/min ([facility]) 8/1/2016    Dental disorder     left lower permanent bridge. upper dentures    Epilepsy ([facility]) 8/1/2016    Heart burn     acid reflux    Osteopenia 8/1/2016    Pneumonia 2015    Seasonal allergies 8/1/2016    Seizure disorder ([facility])     UI (urinary incontinence) 8/1/2016    Varicose veins 8/1/2016    Vitamin B12 deficiency 8/1/2016    Vitamin D deficiency 8/1/2016       The electrodiagnostic studies were performed to evaluate for possible peripheral neuropathy versus radiculopathy.      ELECTRODIAGNOSTIC EXAMINATION:  Nerve conduction studies (NCS) and electromyography (EMG) are utilized to evaluate direct or indirect damage to the peripheral nervous system. NCS are performed to measure the nerve(s) response(s) to electrostimulation across a given nerve segment. EMG evaluates the passive and active electrical activity of the muscle(s) in question.  Muscles are innervated by specific peripheral nerves and roots. Often times, several nerves the muscle to be examined in order to determine the presence or absence of the disease process. Furthermore, nerves and muscles may need to be tested in a [id number] comparison, as well as in additional extremities, as this may be crucial in characterizing the extent of the disease process, which may be diffuse or isolated and of varying degree of  severity. The extent of the neurodiagnostic exam is justified as it may help arrive to a proper diagnosis, which ultimately may contribute to better management of the patient. Therefore, the nerves to muscles examined during the study were medically necessary.    Unless otherwise noted, temperature of the extremity(s) study was monitored before and during the examination and remained between 32 and 36 degrees C for the upper extremities, and between 30 and 36 degrees C for the lower extremities.      NERVE CONDUCTION STUDIES:  Sensory nerves:   - Bilateral Sural nerves were tested. The responses were not elicitable with antidromic stimuli bilaterally.    Motor nerves:   - Bilateral Tibial nerves were examined. Recording electrodes placed at the Abductor Hallucis muscles. The responses were abnormal bilaterally.  Onset latency was normal bilaterally, amplitude was decreased to 2.5 ms on the left and 1.6 ms on the right, conduction velocity was normal on the left and a proximal response could not be elicited on the right.  - Bilateral Deep Peroneal motor nerves were examined. Recording electrodes were placed at the Extensor Digitorum Brevis muscles.The responses were abnormal bilaterally.  Onset latency was normal bilaterally, amplitude was decreased to 0.7 millivolts on the left and 0.4 mV on the right.  Conduction velocity was normal on the left, proximal responses were not elicitable on the right.    ELECTROMYOGRAPHY:  The study was performed the concentric needle electrode. Fibrillation and fasciculation activity is graded by convention from none (0) to continuous (4+).  Needle electrode examination was performed in the following muscles: Bilateral vastus lateralis, tibialis anterior, gastrocnemius, extensor digitorum brevis and abductor hallucis.  No acute denervation changes were noted.  There was no increased insertional activity fibrillation potentials or positive sharp waves.  Chronic neuropathic changes with  significant decrease in recruitment were noted bilaterally but worse on the right in the extensor digitorum brevis and abductor hallucis muscles.      Nerve Conduction Studies     Stim Site NR Onset (ms) Norm Onset (ms) O-P Amp (µV) Norm O-P Amp Site1 Site2 Delta-P (ms) Dist (cm) Thompson (m/s) Norm Thompson (m/s)   Left Sural Anti Sensory (Lat Mall)   Calf *NR  <4.6  >3 Calf Lat Mall  14.0  >40   Right Sural Anti Sensory (Lat Mall)   Calf *NR  <4.6  >3 Calf Lat Mall  14.0  >40        Stim Site NR Onset (ms) Norm Onset (ms) O-P Amp (mV) Norm O-P Amp Site1 Site2 Delta-0 (ms) Dist (cm) Thompson (m/s) Norm Thompson (m/s)   Left Peroneal EDB Motor (Ext Dig Brev)   Ankle    3.8 <6 *0.7 >2.5 B Fib Ankle 6.3 32.0 51 >40   B Fib    10.1  0.5  Poplt B Fib 1.8 10.0 56    Poplt    11.9  0.5          Right Peroneal EDB Motor (Ext Dig Brev)   Ankle    3.0 <6 *0.4 >2.5 B Fib Ankle  0.0  >40   B Fib *NR     Poplt B Fib  0.0     Poplt *NR             Left Tibial Motor (Abd Mchugh Brev)   Ankle    3.4 <6 *2.5 >4 Knee Ankle 9.8 40.0 41 >40   Knee    13.2  0.3          Right Tibial Motor (Abd Mchugh Brev)   Ankle    2.7 <6 *1.6 >4 Knee Ankle  0.0  >40   Knee *NR                             Electromyography     Side Muscle Nerve Root Ins Act Fibs Psw Amp Dur Poly Recrt Int Pat Comment   Right VastusLat Femoral L2-4 Nml Nml Nml Nml Nml 0 Nml Nml    Right AntTibialis Dp Br Fibular L4-5 Nml Nml Nml Nml Nml 0 Nml Nml    Right Gastroc Tibial S1-2 Nml Nml Nml Nml Nml 0 Nml Nml    Right Ext Dig Brev Dp Br Fibular L5, S1 Nml Nml Nml *Decr Nml 0 *Reduced *25%    Right AbdHallucis MedPlantar S1-2 Nml Nml Nml Nml Nml 0 *Reduced *50%    Left VastusLat Femoral L2-4 Nml Nml Nml Nml Nml 0 Nml Nml    Left AntTibialis Dp Br Fibular L4-5 Nml Nml Nml Nml Nml 0 Nml Nml    Left Gastroc Tibial S1-2 Nml Nml Nml Nml Nml 0 Nml Nml    Left Ext Dig Brev Dp Br Fibular L5, S1 Nml Nml Nml *Incr Nml 0 *Reduced *50%    Left AbdHallucis MedPlantar S1-2 Nml Nml Nml *Incr Nml 0 *Reduced *50%             DIAGNOSTIC INTERPRETATION:   Extensive electrodiagnostic studies were performed to the bilateral lower extremities.  The results are as follows:    1.  Low amplitude bilateral tibial and peroneal motor conduction studies.    2.  Absent sural sensory conduction studies bilaterally.    3.  Chronic neuropathic changes with decreased recruitment noted bilaterally in distal lower extremity muscles.      CLINICAL DISCUSSION:   Taken as a whole these results are consistent with a predominantly axonal sensory/motor polyneuropathy which is severe electrophysiologically and associated with chronic neuropathic changes in bilateral distal lower extremity muscles.  Clinical correlation is suggested.      TAY Frazier M.D. (No note.)

## 2023-05-18 ENCOUNTER — GYNECOLOGY VISIT (OUTPATIENT)
Dept: OBGYN | Facility: CLINIC | Age: 78
End: 2023-05-18
Payer: MEDICARE

## 2023-05-18 VITALS
BODY MASS INDEX: 35.08 KG/M2 | SYSTOLIC BLOOD PRESSURE: 119 MMHG | DIASTOLIC BLOOD PRESSURE: 68 MMHG | WEIGHT: 198 LBS | HEIGHT: 63 IN | HEART RATE: 88 BPM

## 2023-05-18 DIAGNOSIS — N95.2 ATROPHIC VAGINITIS: ICD-10-CM

## 2023-05-18 DIAGNOSIS — N39.46 URINARY INCONTINENCE, MIXED: Primary | ICD-10-CM

## 2023-05-18 DIAGNOSIS — N39.0 RECURRENT UTI: ICD-10-CM

## 2023-05-18 DIAGNOSIS — N39.42 INCONTINENCE WITHOUT SENSORY AWARENESS: ICD-10-CM

## 2023-05-18 LAB
APPEARANCE UR: CLEAR
BILIRUB UR STRIP-MCNC: NORMAL MG/DL
COLOR UR AUTO: YELLOW
GLUCOSE UR STRIP.AUTO-MCNC: NEGATIVE MG/DL
KETONES UR STRIP.AUTO-MCNC: NEGATIVE MG/DL
LEUKOCYTE ESTERASE UR QL STRIP.AUTO: NORMAL
NITRITE UR QL STRIP.AUTO: NEGATIVE
PH UR STRIP.AUTO: 6 [PH] (ref 5–8)
PROT UR QL STRIP: NEGATIVE MG/DL
RBC UR QL AUTO: NEGATIVE
SP GR UR STRIP.AUTO: 1.01
UROBILINOGEN UR STRIP-MCNC: NORMAL MG/DL

## 2023-05-18 PROCEDURE — 99204 OFFICE O/P NEW MOD 45 MIN: CPT | Performed by: STUDENT IN AN ORGANIZED HEALTH CARE EDUCATION/TRAINING PROGRAM

## 2023-05-18 PROCEDURE — 81002 URINALYSIS NONAUTO W/O SCOPE: CPT | Performed by: STUDENT IN AN ORGANIZED HEALTH CARE EDUCATION/TRAINING PROGRAM

## 2023-05-18 PROCEDURE — 1170F FXNL STATUS ASSESSED: CPT | Performed by: STUDENT IN AN ORGANIZED HEALTH CARE EDUCATION/TRAINING PROGRAM

## 2023-05-18 PROCEDURE — 3078F DIAST BP <80 MM HG: CPT | Performed by: STUDENT IN AN ORGANIZED HEALTH CARE EDUCATION/TRAINING PROGRAM

## 2023-05-18 PROCEDURE — 3074F SYST BP LT 130 MM HG: CPT | Performed by: STUDENT IN AN ORGANIZED HEALTH CARE EDUCATION/TRAINING PROGRAM

## 2023-05-18 RX ORDER — ESTRADIOL 0.1 MG/G
CREAM VAGINAL
Qty: 1 EACH | Refills: 3 | Status: SHIPPED
Start: 2023-05-18 | End: 2024-02-06

## 2023-05-18 ASSESSMENT — FIBROSIS 4 INDEX: FIB4 SCORE: 2.03

## 2023-05-18 NOTE — PROGRESS NOTES
PT here today for consult   Ref for Mixed Stress / Urge Urinary Incontinence   Hysterectomy? 1985  Good #: [phone] (home)   PVR : 16 mL  Pharmacy Verified

## 2023-05-18 NOTE — PROGRESS NOTES
Urogynecology and Pelvic Reconstructive Surgery Consultation Visit    Nancie Joyner [id number]    Referred by: Bindu VELIZ    Reason for Visit:   Chief Complaint   Patient presents with    New Patient     Consult          Subjective     History of Presenting Illness:    Nancie Joyner is a 77 y.o. year old P4 who was referred by her PCP for the evaluation and management of mixed urinary incontinence.     Seh has significant incontinence for many years, but this is worsening.  She is very bothered by urinary leakage and use incontinence products.  She has not undergone any treatment previously.    She also reports frequent UTIs.  Her symptoms of UTI include increased frequency and burning and lower back discomfort.    She has h/o seizures, followed with Neurology      Prior Pelvic surgery:   1985 hysterectomy     Prior treatment:   Azo        Pelvic floor symptom review:     Bladder:   Voids per day: 5 Voids per night: 2      Urinary incontinence episodes per day: contantly    Urge leakage:  On Movement to Bathroom and Full Bladder   Stress leakage: With Cough, With Laugh, and With Exercise   Continuous / insensible urine loss: Yes   Nocturnal enuresis: Yes   Leakage volume: moderate to large   Number of pads/day: 4-5    Bladder emptying: Complete   Voiding symptoms: None   UTI in last 12 months: yes   Other urologic history: Pyelonephritis      Prolapse:     Prolapse symptoms: None      Bowel:    Constipation: Yes   Bowel movements per day: 2x daily    Straining to empty bowels: No    Splinting to evacuate: No    Painful evacuation: No    Difficulty emptying rectum: No    Incontinence to stool: No   Blood in stool: No    Hemorrhoids: No    Bowel conditions: none   Most recent colonoscopy: 2019       Sexual function:    Sexually active: No    Gender of partners: Male       Past medical and surgical history      Past medical history:  Past Medical History:   Diagnosis Date     CKD (chronic kidney disease) stage 3, GFR 30-59 ml/min ([facility]) 8/1/2016    Osteopenia 8/1/2016    Vitamin B12 deficiency 8/1/2016    Varicose veins 8/1/2016    Seasonal allergies 8/1/2016    UI (urinary incontinence) 8/1/2016    Vitamin D deficiency 8/1/2016    Epilepsy ([facility]) 8/1/2016    Basal cell carcinoma 2015    left eyelid    Pneumonia 2015    Arthritis     hands, knees, lower back    Breath shortness     Dental disorder     left lower permanent bridge. upper dentures    Heart burn     acid reflux    Seizure disorder ([facility])      Past surgical history:  Past Surgical History:   Procedure Laterality Date    PB TOTAL KNEE ARTHROPLASTY Right 5/10/2022    Procedure: Right total knee arthroplasty;  Surgeon: Jam Lowe M.D.;  Location: SURGERY [facility];  Service: Orthopedics    CHOLECYSTECTOMY  1989    TUBAL COAGULATION LAPAROSCOPIC BILATERAL  1979    ABDOMINAL HYSTERECTOMY TOTAL      VEIN STRIPPING       Medications:has a current medication list which includes the following prescription(s): estradiol, fluticasone, omeprazole, phenytoin er, azelastine hcl, cetirizine, calcium carbonate, multiple vitamin, vitamin d, and cyanocobalamin.  Allergies:Patient has no known allergies.  Family history:  Family History   Problem Relation Age of Onset    Cancer Mother         STOMACH    Lung Cancer Father     Cancer Sister         sinus    Alzheimer's Disease Sister     Heart Disease Sister     Cancer Sister     Heart Disease Sister     Breast Cancer Sister     Cancer Brother         pancreatic cancer    Seizures Paternal Aunt     No Known Problems Maternal Grandmother     No Known Problems Maternal Grandfather     No Known Problems Paternal Grandmother     No Known Problems Paternal Grandfather     Diabetes Daughter      Social history: reports that she quit smoking about 44 years ago. Her smoking use included cigarettes. She started smoking about 46 years ago. She has a 1.00 pack-year smoking history. She has never used  "smokeless tobacco. She reports that she does not drink alcohol and does not use drugs.    Review of systems: A full review of systems was performed, and negative with the exception of want is noted above in the HPI.        Objective        /68 (BP Location: Left arm, Patient Position: Sitting, BP Cuff Size: Large adult)   Pulse 88   Ht 5' 3\"   Wt 198 lb   BMI 35.07 kg/m²     Physical Exam  Vitals reviewed. Exam conducted with a chaperone present.   Constitutional:       Appearance: Normal appearance.   HENT:      Head: Normocephalic.      Mouth/Throat:      Mouth: Mucous membranes are moist.   Cardiovascular:      Rate and Rhythm: Normal rate.   Pulmonary:      Effort: Pulmonary effort is normal.   Skin:     General: Skin is warm and dry.   Neurological:      Mental Status: She is alert.   Psychiatric:         Mood and Affect: Mood normal.         Genitourinary: Deferred     Procedure Performed: No    Diagnostic test and records review:    Urine dipstick: tr leuk     Post-void residual: 16 mL, performed by Bladder Scanner    Labs: n/a    Radiology: n/a    Documentation reviewed: Prior EMR Records             Assessment & Plan     Nancie Joyner is a 77 y.o. year old P4 with severe mixed urinary incontinence, recurrent UTI. We discussed my recommendations for further diagnosis and treatment at length today.     1. Urinary incontinence, mixed  2. Incontinence without sensory awareness  Ms. Joyner reports symptoms of mixed stress and urge urinary incontinence.  She was educated on the pathophysiology of bladder urgency, and that her symptoms are likely due to overactivity of the bladder muscle and nerves. The pathogenesis of ESDRAS is related to weakness in the pelvic structures includes genetic tendency, aging, menopause and childbirth injuries. I discussed options for management which include both nonsurgical and surgical options.  For ESDRAS, management includes non-surgical pelvic floor physical " therapy and pessary use.  I discussed different types of pessary that may be used for stress urinary incontinence as well as pessary care.  She also may be a candidate for a mid-urethral sling or urethral bulking procedure. For urge incontinence and overactive bladder, I reviewed conservative/behavioral management strategies, including fluid management, avoidance of bladder irritants, urge strategies and Kegel's exercises. Moderate weight loss in obese patients (5-8%) can lead to significant urinary symptom improvement. Overview of pelvic floor physical therapy, biofeedback, and second-line oral medical therapy (anticholinergics, beta-3 agonists) and third-line therapies (intravesical botox injection, PTNS, sacral neuromodulation) discussed.   Plan:   - Urinalysis to evaluate for microhematuria - neg  - Referral given for pelvic floor PT to optimize neuromuscular functioning and teach urge suppression strategies  - Due to the severity and mixed nature of her symptoms I require urodynamic testing before moving forward with any robust third line therapies.    -She has complete bladder emptying.  For these reasons she qualifies for a trial of beta 3 agonist therapy.  Samples given for vibegron/Gemtesa 75 mg once daily.  This can take up to 2 weeks to take effect.      3. Recurrent UTI  She was was counseled on the pathophysiology of recurrent UTI. In the normal host, most uropathogens originate in the rectal javan, colonize the periurethral area and urethra, and ascend to the bladder. Alteration of the normal vaginal javan, especially loss of W1I4-hxysuhpwn lactobacilli, may predispose women to vaginal colonization with bacteria and to UTI. Risk factors include sexual intercourse, family history, menopause, concurrent urinary incontinence, pessary use, and prior history of UTIs.   Counseled that bacteria in the urine without symptoms does not require treatment - conversely symptoms without bacteria on culture do not  require antibiotics and further workup is required.   The importance of obtaining urine cultures with each bout of symptoms was emphasized. She should call/message our office when she has new symptoms, and then present to the closest [facility] lab for a urinalysis and culture to guide treatment. If she is able to correctly predict 3 culture-proven UTI then standing self-treatment prescription can be discussed.  Sending urine culture ordered  I counseled the most important intervention for treating her recurrent UTIs would be vaginal estrogen, which has the high success in lowest risk profile with all preventative strategies in postmenopausal women.  This works by optimizing tissue quality and rebuilding the healthy vaginal microbiome.  I recommend cystourethroscopy to rule out any structural causes for recurrent infection. Cystoscopy procedure reviewed, which will take place in the office without sedation. Risks of infection and rare risk of perforation discussed.     4. 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 This is very common and due to low estrogen levels, which render the vaginal tissue thin, irritated, and open to colonization with gut javan. This can lead to irritation, dryness, painful sex, urinary infections and urinary urgency. Discussed risks, benefits, and indications for vaginal estrogen therapy.  Vaginal estrogen has negligible absorption into the bloodstream and is not associated with increased risks for uterine or breast cancers. Prescription given for estrace vaginal cream to be placed inside vagina nightly for 2 weeks, then twice weekly thereafter. The effects of vaginal estrogen can take weeks to months.    - estradiol (ESTRACE VAGINAL) 0.1 MG/GM vaginal cream; Apply 1g cream inside vagina using applicator nightly for 2 weeks, then twice per week thereafter  Dispense: 1 Each; Refill: 3             Hannah Zendejas MD, FACOG  Urogynecology and Pelvic  Reconstructive Surgery  Department of Obstetrics and Gynecology  [facility]    CC: Bindu VELIZ      This medical record contains text that has been entered with the assistance of computer voice recognition and dictation software.  Therefore, it may contain unintended errors in text, spelling, punctuation, or grammar

## 2023-07-13 ENCOUNTER — TELEPHONE (OUTPATIENT)
Dept: NEUROLOGY | Facility: MEDICAL CENTER | Age: 78
End: 2023-07-13
Payer: MEDICARE

## 2023-07-13 NOTE — TELEPHONE ENCOUNTER
NEUROLOGY PATIENT PRE-VISIT PLANNING     Patient was NOT contacted to complete PVP.    Patient Appointment is scheduled as: Established Patient     Is visit type and length scheduled correctly? Yes    [facility] Patient Demographics? Yes    3.   Is referral attached to visit? Yes    4. Were records received from referring provider? Yes    4. Patient was NOT contacted to have someone accompany them to visit.     5. Is this appointment scheduled as a Hospital Follow-Up?  No    6. Does the patient require any pre procedure or post procedure follow up? No    7. If any orders were placed at last visit or intended to be done for this visit do we have Results/Consult Notes? Yes  Labs - Labs ordered, completed on 02/26/23 and results are in chart.  Imaging - Imaging ordered, completed and results are in chart.  Referrals - A referral was placed for an EMG and it was completed.     8. If patient appointment is for Botox - is order pended for provider? N/A

## 2023-07-16 PROCEDURE — 52000 CYSTOURETHROSCOPY: CPT | Performed by: STUDENT IN AN ORGANIZED HEALTH CARE EDUCATION/TRAINING PROGRAM

## 2023-07-17 ENCOUNTER — OFFICE VISIT (OUTPATIENT)
Dept: NEUROLOGY | Facility: MEDICAL CENTER | Age: 78
End: 2023-07-17
Attending: PSYCHIATRY & NEUROLOGY
Payer: MEDICARE

## 2023-07-17 VITALS
DIASTOLIC BLOOD PRESSURE: 72 MMHG | OXYGEN SATURATION: 95 % | SYSTOLIC BLOOD PRESSURE: 132 MMHG | HEIGHT: 63 IN | TEMPERATURE: 97.1 F | HEART RATE: 80 BPM | BODY MASS INDEX: 36.29 KG/M2 | WEIGHT: 204.81 LBS

## 2023-07-17 DIAGNOSIS — G40.209 COMPLEX PARTIAL SEIZURES EVOLVING TO GENERALIZED TONIC-CLONIC SEIZURES (HCC): ICD-10-CM

## 2023-07-17 DIAGNOSIS — R27.0 ATAXIA: ICD-10-CM

## 2023-07-17 DIAGNOSIS — G62.9 SENSORY NEUROPATHY: ICD-10-CM

## 2023-07-17 PROCEDURE — 99215 OFFICE O/P EST HI 40 MIN: CPT | Performed by: PSYCHIATRY & NEUROLOGY

## 2023-07-17 PROCEDURE — 3075F SYST BP GE 130 - 139MM HG: CPT | Performed by: PSYCHIATRY & NEUROLOGY

## 2023-07-17 PROCEDURE — 99212 OFFICE O/P EST SF 10 MIN: CPT | Performed by: PSYCHIATRY & NEUROLOGY

## 2023-07-17 PROCEDURE — 3078F DIAST BP <80 MM HG: CPT | Performed by: PSYCHIATRY & NEUROLOGY

## 2023-07-17 PROCEDURE — 1170F FXNL STATUS ASSESSED: CPT | Performed by: PSYCHIATRY & NEUROLOGY

## 2023-07-17 RX ORDER — PHENYTOIN SODIUM 100 MG/1
CAPSULE, EXTENDED RELEASE ORAL
Qty: 118 CAPSULE | Refills: 0 | Status: SHIPPED | OUTPATIENT
Start: 2023-07-17 | End: 2023-07-17 | Stop reason: SDUPTHER

## 2023-07-17 RX ORDER — PHENYTOIN SODIUM 100 MG/1
CAPSULE, EXTENDED RELEASE ORAL
Qty: 118 CAPSULE | Refills: 0 | Status: SHIPPED | OUTPATIENT
Start: 2023-07-17 | End: 2023-08-23

## 2023-07-17 ASSESSMENT — ENCOUNTER SYMPTOMS
MEMORY LOSS: 0
SEIZURES: 0
TINGLING: 1
SENSORY CHANGE: 1
FALLS: 0
LOSS OF CONSCIOUSNESS: 0

## 2023-07-17 ASSESSMENT — FIBROSIS 4 INDEX: FIB4 SCORE: 2.03

## 2023-07-17 NOTE — PROGRESS NOTES
Subjective     Nancie Joyner is a 77 y.o. female who presents with her  Cristhian, for follow-up, with a history of focal onset seizures with altered sensorium and rare secondary generalization, persistent gait ataxia and a sensory polyneuropathy of unclear etiology.  She is now status post MRI imaging of the brain, blood work and EMG/NCV studies.     HPI    Seizures: Nancie remains seizure-free, she is still on a very high dose regimen of Dilantin 500 mg every evening.  She is compliant.    Dilantin level was rechecked February 23, 2023 as a morning trough level, at 20.7, a level of 27.9 also drawn at the same time from 3 weeks prior.  Prior to that, from 2017-20, levels were always in the low to low therapeutic range, she has been on a dose of 500 mg daily during this entire interval.    Ataxia: Also unchanged, repeat MRI of the brain did reveal, when compared to the previous study from 2009, what I interpret is a more selective cerebellar vermian atrophy, this has not progressed drastically in the last 14 years but I believe it is still there.  Otherwise there is minimal structural pathology that might explain the gait difficulties she experiences.    Sensory neuropathy: She still has the numbness and tingling in the feet.  She has nothing involving the hands, there is no weakness.  The instability is present whether or not she looks at the ground or straightahead, whether or not ambient lighting is diminished.    EMG/NCV studies did reveal a sensorimotor, axonal polyneuropathy.  Blood work included screen for autoimmune disease, vitamin levels, and endocrine abnormalities, none of which prove remarkable.    Medical, surgical and family histories are reviewed, there are no new drug allergies.  She is on Dilantin 500 mg every evening, Estrace, Prilosec, Zyrtec, Flonase, vitamin D with calcium, vitamin B12, multivitamin.    Review of Systems   Musculoskeletal:  Negative for falls.   Neurological:   "Positive for tingling and sensory change. Negative for seizures and loss of consciousness.   Psychiatric/Behavioral:  Negative for memory loss.    All other systems reviewed and are negative.    Objective     /72 (BP Location: Right arm, Patient Position: Sitting, BP Cuff Size: Adult)   Pulse 80   Temp 36.2 °C (97.1 °F) (Temporal)   Ht 1.6 m (5' 3\")   Wt 92.9 kg (204 lb 12.9 oz)   SpO2 95%   BMI 36.28 kg/m²      Physical Exam    She appears in no acute distress.  Vital signs are stable.  There is no malar rash.  The neck is supple, range of motion is full.  Cardiac evaluation is unremarkable.     Neurological Exam    She is still fully oriented, there is no aphasia or inattention.  Mental processing speed is still slowed.    PERRLA/EOMI, there is no nystagmus, visual fields are full, facial movements are symmetric, there is no bulbar dysfunction.  There is no hypophonia or tachyphemia.    Musculoskeletal exam again reveals her to move all 4 extremities symmetrically.  Reflex exam is unchanged from that of our initial evaluation in February, 2023.    The balance difficulties are also significant and also unchanged.  Station is widened, she is always looking for her 's arm to hang onto as she walks.  Repetitive movements are symmetric throughout.  There is no appendicular dystaxia.    Sensory exam is deferred for today.    Assessment & Plan     1. Complex partial seizures evolving to generalized tonic-clonic seizures (HCC)  The seizures themselves are stable, review of the records indicates that she has had subtherapeutic levels of Dilantin for years even though she has been seizure-free during that interval.  Thus I think it is reasonable to try to get her on a lower dose of Dilantin without increasing her risk of seizure recurrence.  They were informed of these risks nonetheless.  We will reduce Dilantin to 400 mg every evening for 1 week and then 300 mg every evening.  We will check a drug level " 2 weeks after she has been on the lower dose, assuming she has no breakthrough seizures.  We will then follow along on a monthly basis to make sure things are at a steady state.  Hopefully this may also improve the balance difficulties that she is suffering from.    - DILANTIN; Standing  - phenytoin ER (DILANTIN) 100 MG Cap; Take 4 Capsules by mouth every evening for 7 days, THEN 3 Capsules every evening for 30 days.  Dispense: 118 Capsule; Refill: 0    2. Sensory neuropathy  Still unclear as to etiology, I will order a heavy metal screen in addition to autoimmune and paraneoplastic autoantibodies.  Dilantin use can be associated with neuropathy though quite often disease in the setting of diminished B12 and folate levels, such as not the case with her.    - HEAVY METALS BLOOD; Future  - Paraneoplastic Autoantibody Eval (Ser); Future  - Encephalopathy Autoimmune Eval (Serum); Future    3. Ataxia  Multifactorial, even though the neuropathy is contributing, I do believe that there is a long-term effect on cerebellar atrophy related to chronic Dilantin use for more than 30 years.  Unfortunately, even stopping Dilantin now would not help things improve, it would simply allow things to plateau.  To help with the balance difficulties, it would require changing from Dilantin to another antiepileptic which is relatively easy though still there is a risk of seizure breakthrough.  The neuropathy itself, unless we can identify a cause (other than the Dilantin itself which may be addressed if we change her to another AED), may be a persistent issue for her moving forwards.  We will communicate via [employer] moving forwards, we will follow-up in 6 months time.    - DILANTIN; Standing    Time: 40 minutes in total spent on patient care including pre-charting, record review, discussion with healthcare staff and documentation.  This includes face-to-face time for exam, review, discussion, as well as counseling and coordinating  care.

## 2023-07-17 NOTE — PROCEDURES
Procedure note: Cystourethroscopy    Procedure performed: Cystourethroscopy (01764)      Indication: Nancie Joyner is a 77 y.o.  with recurrent UTI. She presents for office diagnostic cystourethroscopy testing today to fully elucidate her bladder structure or underlying cause for symptoms. Written consent was obtained after review of risk and benefit.       Cystoscopy    Date/Time: 7/16/2023 8:30 PM    Performed by: Hannah Zendejas M.D.  Authorized by: Hannah Zendejas M.D.    Chaperone present: yes    Timeout: timeout called immediately prior to procedure    Prep: patient was prepped and draped in usual sterile fashion    Prep type: Betadine    Anesthesia: local anesthesia      Procedure Details     Cystoscope type: flexible    Cystoscopy route: transurethral      Irrigation used: saline      Position: dorsal lithotomy    Urethra     Urethra: normal      Vagina     Vagina: normal      Bladder     Bladder comment: Grade 1 trabeculations, otherwise normal urothelium without masses/lesions/diverticula    Post-Procedure Details     Appearance of urine after procedure: clear    Outcome: patient tolerated procedure well with no complications      Post-procedure interventions: post-procedure instructions given      Disposition: discharged home in satisfactory condition        Hannah Zendejas MD

## 2023-07-17 NOTE — PROCEDURES
Procedure note: Complex urodynamic testing    Procedure performed:    -     03224 Complex Uroflowmetry  91146 Complex CMG w/ voiding pressure study AND urethral pressure  63956 EMG studies anal or urethral sphincter   87556 Intraabdominal catheter       Indication: Nancie Joyner is a 77 y.o. year old with mixed incontinence.     Bladder symptoms:     Voids per day: 5          Voids per night: 2                 Urinary incontinence episodes per day: contantly               Urge leakage:  On Movement to Bathroom and Full Bladder              Stress leakage: With Cough, With Laugh, and With Exercise              Continuous / insensible urine loss: Yes              Nocturnal enuresis: Yes              Leakage volume: moderate to large              Number of pads/day: 4-5               Bladder emptying: Complete    POP-Q: no prolapse    She presents for complex urodynamic testing today to fully elucidate her bladder function and symptom pathophysiology, in order to guide further treatment    Verbal consent was obtained after review of risk and benefit.     Chaperone: Ioana Bray MA      Procedure: The patient was taken to the urodynamic suite and placed in the urodynamic chair. She underwent sterile prep with betadine prior to catheterization. There was a negative urinalysis. Air-charged catheters were placed in the urethra/bladder and vagina. Urodynamics were performed using routine techniques.     Urodynamic findings:     Preliminary Uroflometry     Flow pattern: continuous fluctuant  Maximum flow: 12 mL/sec  Average flow: 5.8 mL/sec  Voided volume: 134 mL  Post-void residual: 1 mL  Flow time: 22 sec    Filling cystometrogram    First sensation: n/a  First desire: 334 mL  Strong Desire: n/a  Infusion stopped at: 470 mL  Stress leakage: no  Uninhibited detrusor contractions present: no  Leakage with DO: no  Leak point pressures  At 153mL volume: did not leak to 104 cm H2O  At 424mL volume: did not leak  to 120 cm H2O  Compliance: normal    Urethral pressure profile    Maximum urethral closing pressure (MUCP): 687 cm H2O  Morphology: normal    Pressure voiding study    The patient's voiding mechanism was accomplished by unclear - likley some detrusor and valsalva. Unable to void with caths in place  Max flow: 2.7 mL/sec  Average flow: 1.3 mL/sec  Post-void residual: 462 mL  Pdet at peak flow: 3.4 cm H2O  Flow time: 1.9 sec  Pelvic floor EMG silenced during voiding: yes    Pelvic floor EMG: Normal     Assessment:     She has completed urodynamic testing, which was uncomplicated.     Filling phase: Normal capacity and compliance. No leakage elicited. NO uninhibited detrusor contractions   Voiding phase: Complete emptying on uroflow, unable to void with caths in place. Difficult to interpret detrusor contraction with void due to patient back spasm and movements of caths.     Plan:   She was counseled on urodynamic findings  She has had some improvement with vibegron, has not yet seen PT.   I strongly recommend PT evaluation as a primary management for her incontinence, as this is he safest and least likely to cause problems. If she would like further steps, she can either continue with medication, or trial PTNS. She is not the best candidate for botox due to UTIs and possible dysfunctional voiding, and she may have trouble programming SNM.   She opts for trial of PT and to continue with oral medication. Rx sent for mirabegron 50mg daily. She is not a candidate for anticholinergic therapy due to current memory impairment issues  Follow up in 3-4 months  Counseled on normal post-UDS symptoms including burning and possible hematuria. If this persists after 2 days she should call or send [employer] message.     Hannah Zendejas MD, FACOG    Female Pelvic Medicine and Reconstructive Surgery  Department of Obstetrics and Gynecology  [facility]  [facility]

## 2023-07-18 ENCOUNTER — OFFICE VISIT (OUTPATIENT)
Dept: OBGYN | Facility: CLINIC | Age: 78
End: 2023-07-18
Payer: MEDICARE

## 2023-07-18 DIAGNOSIS — N39.42 INCONTINENCE WITHOUT SENSORY AWARENESS: ICD-10-CM

## 2023-07-18 DIAGNOSIS — N39.41 URGE URINARY INCONTINENCE: ICD-10-CM

## 2023-07-18 DIAGNOSIS — N39.0 RECURRENT UTI: Primary | ICD-10-CM

## 2023-07-18 LAB
APPEARANCE UR: CLEAR
BILIRUB UR STRIP-MCNC: NORMAL MG/DL
COLOR UR AUTO: YELLOW
GLUCOSE UR STRIP.AUTO-MCNC: NEGATIVE MG/DL
KETONES UR STRIP.AUTO-MCNC: NEGATIVE MG/DL
LEUKOCYTE ESTERASE UR QL STRIP.AUTO: NEGATIVE
NITRITE UR QL STRIP.AUTO: NEGATIVE
PH UR STRIP.AUTO: 7 [PH] (ref 5–8)
PROT UR QL STRIP: NEGATIVE MG/DL
RBC UR QL AUTO: NEGATIVE
SP GR UR STRIP.AUTO: 1.01
UROBILINOGEN UR STRIP-MCNC: NORMAL MG/DL

## 2023-07-18 PROCEDURE — 1170F FXNL STATUS ASSESSED: CPT | Performed by: STUDENT IN AN ORGANIZED HEALTH CARE EDUCATION/TRAINING PROGRAM

## 2023-07-18 PROCEDURE — 51784 ANAL/URINARY MUSCLE STUDY: CPT | Mod: 51 | Performed by: STUDENT IN AN ORGANIZED HEALTH CARE EDUCATION/TRAINING PROGRAM

## 2023-07-18 PROCEDURE — 51729 CYSTOMETROGRAM W/VP&UP: CPT | Performed by: STUDENT IN AN ORGANIZED HEALTH CARE EDUCATION/TRAINING PROGRAM

## 2023-07-18 PROCEDURE — 81002 URINALYSIS NONAUTO W/O SCOPE: CPT | Performed by: STUDENT IN AN ORGANIZED HEALTH CARE EDUCATION/TRAINING PROGRAM

## 2023-07-18 PROCEDURE — 51741 ELECTRO-UROFLOWMETRY FIRST: CPT | Mod: 51 | Performed by: STUDENT IN AN ORGANIZED HEALTH CARE EDUCATION/TRAINING PROGRAM

## 2023-07-18 PROCEDURE — 51797 INTRAABDOMINAL PRESSURE TEST: CPT | Performed by: STUDENT IN AN ORGANIZED HEALTH CARE EDUCATION/TRAINING PROGRAM

## 2023-07-18 ASSESSMENT — FIBROSIS 4 INDEX: FIB4 SCORE: 2.03

## 2023-07-25 ENCOUNTER — APPOINTMENT (RX ONLY)
Dept: URBAN - METROPOLITAN AREA CLINIC 22 | Facility: CLINIC | Age: 78
Setting detail: DERMATOLOGY
End: 2023-07-25

## 2023-07-25 DIAGNOSIS — Z85.828 PERSONAL HISTORY OF OTHER MALIGNANT NEOPLASM OF SKIN: ICD-10-CM

## 2023-07-25 DIAGNOSIS — D22 MELANOCYTIC NEVI: ICD-10-CM

## 2023-07-25 DIAGNOSIS — L82.1 OTHER SEBORRHEIC KERATOSIS: ICD-10-CM

## 2023-07-25 DIAGNOSIS — B07.8 OTHER VIRAL WARTS: ICD-10-CM

## 2023-07-25 DIAGNOSIS — L81.4 OTHER MELANIN HYPERPIGMENTATION: ICD-10-CM

## 2023-07-25 DIAGNOSIS — L91.8 OTHER HYPERTROPHIC DISORDERS OF THE SKIN: ICD-10-CM

## 2023-07-25 DIAGNOSIS — L57.0 ACTINIC KERATOSIS: ICD-10-CM

## 2023-07-25 PROBLEM — D22.5 MELANOCYTIC NEVI OF TRUNK: Status: ACTIVE | Noted: 2023-07-25

## 2023-07-25 PROCEDURE — 99203 OFFICE O/P NEW LOW 30 MIN: CPT | Mod: 25

## 2023-07-25 PROCEDURE — ? COUNSELING

## 2023-07-25 PROCEDURE — 17000 DESTRUCT PREMALG LESION: CPT | Mod: 59

## 2023-07-25 PROCEDURE — 17110 DESTRUCTION B9 LES UP TO 14: CPT

## 2023-07-25 PROCEDURE — ? LIQUID NITROGEN

## 2023-07-25 PROCEDURE — ? SUNSCREEN TREATMENT REGIMEN

## 2023-07-25 ASSESSMENT — LOCATION SIMPLE DESCRIPTION DERM
LOCATION SIMPLE: RIGHT CHEEK
LOCATION SIMPLE: INFERIOR FOREHEAD
LOCATION SIMPLE: LEFT ANTERIOR NECK
LOCATION SIMPLE: LEFT FOREARM
LOCATION SIMPLE: UPPER BACK
LOCATION SIMPLE: TRAPEZIAL NECK
LOCATION SIMPLE: RIGHT FOREARM
LOCATION SIMPLE: LEFT HAND
LOCATION SIMPLE: LEFT CHEEK
LOCATION SIMPLE: LEFT SUPERIOR EYELID
LOCATION SIMPLE: LEFT UPPER BACK
LOCATION SIMPLE: LEFT BREAST
LOCATION SIMPLE: RIGHT UPPER BACK
LOCATION SIMPLE: ABDOMEN
LOCATION SIMPLE: LEFT ELBOW

## 2023-07-25 ASSESSMENT — LOCATION DETAILED DESCRIPTION DERM
LOCATION DETAILED: LEFT MEDIAL SUPERIOR EYELID
LOCATION DETAILED: SUPERIOR THORACIC SPINE
LOCATION DETAILED: RIGHT RIB CAGE
LOCATION DETAILED: LEFT INFRAMAMMARY CREASE (INNER QUADRANT)
LOCATION DETAILED: LEFT MID-UPPER BACK
LOCATION DETAILED: LEFT VENTRAL PROXIMAL FOREARM
LOCATION DETAILED: LEFT INFERIOR LATERAL NECK
LOCATION DETAILED: INFERIOR MID FOREHEAD
LOCATION DETAILED: LEFT SUPERIOR MEDIAL MALAR CHEEK
LOCATION DETAILED: LEFT RADIAL DORSAL HAND
LOCATION DETAILED: RIGHT INFERIOR CENTRAL MALAR CHEEK
LOCATION DETAILED: RIGHT VENTRAL PROXIMAL FOREARM
LOCATION DETAILED: INFERIOR THORACIC SPINE
LOCATION DETAILED: LEFT LATERAL SUPERIOR EYELID
LOCATION DETAILED: EPIGASTRIC SKIN
LOCATION DETAILED: RIGHT DISTAL DORSAL FOREARM
LOCATION DETAILED: MID TRAPEZIAL NECK
LOCATION DETAILED: LEFT ELBOW
LOCATION DETAILED: RIGHT SUPERIOR UPPER BACK

## 2023-07-25 ASSESSMENT — LOCATION ZONE DERM
LOCATION ZONE: FACE
LOCATION ZONE: TRUNK
LOCATION ZONE: ARM
LOCATION ZONE: NECK
LOCATION ZONE: HAND
LOCATION ZONE: EYELID

## 2023-07-25 NOTE — PROCEDURE: LIQUID NITROGEN
Add 52 Modifier (Optional): no
Consent: The patient's consent was obtained including but not limited to risks of crusting, scabbing, blistering, scarring, darker or lighter pigmentary change, recurrence, incomplete removal and infection.
Spray Paint Text: The liquid nitrogen was applied to the skin utilizing a spray paint frosting technique.
Number Of Freeze-Thaw Cycles: 2 freeze-thaw cycles
Show Aperture Variable?: Yes
Medical Necessity Clause: This procedure was medically necessary because the lesions that were treated were:
Medical Necessity Information: It is in your best interest to select a reason for this procedure from the list below. All of these items fulfill various CMS LCD requirements except the new and changing color options.
Detail Level: Detailed
Post-Care Instructions: I reviewed with the patient in detail post-care instructions. Patient is to wear sunprotection, and avoid picking at any of the treated lesions. Pt may apply Vaseline to crusted or scabbing areas.
Duration Of Freeze Thaw-Cycle (Seconds): 0

## 2023-08-16 ENCOUNTER — HOSPITAL ENCOUNTER (OUTPATIENT)
Dept: LAB | Facility: MEDICAL CENTER | Age: 78
End: 2023-08-16
Attending: NURSE PRACTITIONER
Payer: MEDICARE

## 2023-08-16 ENCOUNTER — HOSPITAL ENCOUNTER (OUTPATIENT)
Dept: LAB | Facility: MEDICAL CENTER | Age: 78
End: 2023-08-16
Attending: PSYCHIATRY & NEUROLOGY
Payer: MEDICARE

## 2023-08-16 ENCOUNTER — HOSPITAL ENCOUNTER (OUTPATIENT)
Dept: LAB | Facility: MEDICAL CENTER | Age: 78
End: 2023-08-16
Attending: STUDENT IN AN ORGANIZED HEALTH CARE EDUCATION/TRAINING PROGRAM
Payer: MEDICARE

## 2023-08-16 DIAGNOSIS — E66.01 CLASS 2 SEVERE OBESITY DUE TO EXCESS CALORIES WITH SERIOUS COMORBIDITY AND BODY MASS INDEX (BMI) OF 35.0 TO 35.9 IN ADULT (HCC): ICD-10-CM

## 2023-08-16 DIAGNOSIS — N39.0 RECURRENT UTI: ICD-10-CM

## 2023-08-16 DIAGNOSIS — E78.5 DYSLIPIDEMIA: ICD-10-CM

## 2023-08-16 DIAGNOSIS — R27.0 ATAXIA: ICD-10-CM

## 2023-08-16 DIAGNOSIS — G40.209 COMPLEX PARTIAL SEIZURES EVOLVING TO GENERALIZED TONIC-CLONIC SEIZURES (HCC): ICD-10-CM

## 2023-08-16 DIAGNOSIS — E53.8 VITAMIN B12 DEFICIENCY: ICD-10-CM

## 2023-08-16 DIAGNOSIS — Z00.00 ROUTINE HEALTH MAINTENANCE: ICD-10-CM

## 2023-08-16 DIAGNOSIS — E55.9 VITAMIN D DEFICIENCY: ICD-10-CM

## 2023-08-16 DIAGNOSIS — G62.9 SENSORY NEUROPATHY: ICD-10-CM

## 2023-08-16 LAB
25(OH)D3 SERPL-MCNC: 85 NG/ML (ref 30–100)
ALBUMIN SERPL BCP-MCNC: 4.3 G/DL (ref 3.2–4.9)
ALBUMIN/GLOB SERPL: 1.5 G/DL
ALP SERPL-CCNC: 97 U/L (ref 30–99)
ALT SERPL-CCNC: 21 U/L (ref 2–50)
ANION GAP SERPL CALC-SCNC: 8 MMOL/L (ref 7–16)
AST SERPL-CCNC: 24 U/L (ref 12–45)
BASOPHILS # BLD AUTO: 0.5 % (ref 0–1.8)
BASOPHILS # BLD: 0.02 K/UL (ref 0–0.12)
BILIRUB SERPL-MCNC: 0.2 MG/DL (ref 0.1–1.5)
BUN SERPL-MCNC: 16 MG/DL (ref 8–22)
CALCIUM ALBUM COR SERPL-MCNC: 9.2 MG/DL (ref 8.5–10.5)
CALCIUM SERPL-MCNC: 9.4 MG/DL (ref 8.5–10.5)
CHLORIDE SERPL-SCNC: 103 MMOL/L (ref 96–112)
CHOLEST SERPL-MCNC: 180 MG/DL (ref 100–199)
CO2 SERPL-SCNC: 27 MMOL/L (ref 20–33)
CREAT SERPL-MCNC: 0.84 MG/DL (ref 0.5–1.4)
EOSINOPHIL # BLD AUTO: 0.1 K/UL (ref 0–0.51)
EOSINOPHIL NFR BLD: 2.3 % (ref 0–6.9)
ERYTHROCYTE [DISTWIDTH] IN BLOOD BY AUTOMATED COUNT: 46.2 FL (ref 35.9–50)
FASTING STATUS PATIENT QL REPORTED: NORMAL
GFR SERPLBLD CREATININE-BSD FMLA CKD-EPI: 70 ML/MIN/1.73 M 2
GLOBULIN SER CALC-MCNC: 2.8 G/DL (ref 1.9–3.5)
GLUCOSE SERPL-MCNC: 92 MG/DL (ref 65–99)
HCT VFR BLD AUTO: 42.2 % (ref 37–47)
HDLC SERPL-MCNC: 58 MG/DL
HGB BLD-MCNC: 13.9 G/DL (ref 12–16)
IMM GRANULOCYTES # BLD AUTO: 0.01 K/UL (ref 0–0.11)
IMM GRANULOCYTES NFR BLD AUTO: 0.2 % (ref 0–0.9)
LDLC SERPL CALC-MCNC: 100 MG/DL
LYMPHOCYTES # BLD AUTO: 1.43 K/UL (ref 1–4.8)
LYMPHOCYTES NFR BLD: 32.2 % (ref 22–41)
MCH RBC QN AUTO: 32.9 PG (ref 27–33)
MCHC RBC AUTO-ENTMCNC: 32.9 G/DL (ref 32.2–35.5)
MCV RBC AUTO: 99.8 FL (ref 81.4–97.8)
MONOCYTES # BLD AUTO: 0.38 K/UL (ref 0–0.85)
MONOCYTES NFR BLD AUTO: 8.6 % (ref 0–13.4)
NEUTROPHILS # BLD AUTO: 2.5 K/UL (ref 1.82–7.42)
NEUTROPHILS NFR BLD: 56.2 % (ref 44–72)
NRBC # BLD AUTO: 0 K/UL
NRBC BLD-RTO: 0 /100 WBC (ref 0–0.2)
PHENYTOIN SERPL-MCNC: 3.4 UG/ML (ref 10–20)
PLATELET # BLD AUTO: 197 K/UL (ref 164–446)
PMV BLD AUTO: 10.1 FL (ref 9–12.9)
POTASSIUM SERPL-SCNC: 4.2 MMOL/L (ref 3.6–5.5)
PROT SERPL-MCNC: 7.1 G/DL (ref 6–8.2)
RBC # BLD AUTO: 4.23 M/UL (ref 4.2–5.4)
SODIUM SERPL-SCNC: 138 MMOL/L (ref 135–145)
TRIGL SERPL-MCNC: 109 MG/DL (ref 0–149)
TSH SERPL DL<=0.005 MIU/L-ACNC: 3.9 UIU/ML (ref 0.38–5.33)
VIT B12 SERPL-MCNC: 665 PG/ML (ref 211–911)
WBC # BLD AUTO: 4.4 K/UL (ref 4.8–10.8)

## 2023-08-16 PROCEDURE — 80061 LIPID PANEL: CPT

## 2023-08-16 PROCEDURE — 80053 COMPREHEN METABOLIC PANEL: CPT

## 2023-08-16 PROCEDURE — 83519 RIA NONANTIBODY: CPT | Mod: 91

## 2023-08-16 PROCEDURE — 83825 ASSAY OF MERCURY: CPT

## 2023-08-16 PROCEDURE — 87086 URINE CULTURE/COLONY COUNT: CPT

## 2023-08-16 PROCEDURE — 86255 FLUORESCENT ANTIBODY SCREEN: CPT | Mod: 91

## 2023-08-16 PROCEDURE — 82300 ASSAY OF CADMIUM: CPT

## 2023-08-16 PROCEDURE — 36415 COLL VENOUS BLD VENIPUNCTURE: CPT

## 2023-08-16 PROCEDURE — 82175 ASSAY OF ARSENIC: CPT

## 2023-08-16 PROCEDURE — 82306 VITAMIN D 25 HYDROXY: CPT

## 2023-08-16 PROCEDURE — 83655 ASSAY OF LEAD: CPT

## 2023-08-16 PROCEDURE — 85025 COMPLETE CBC W/AUTO DIFF WBC: CPT

## 2023-08-16 PROCEDURE — 80185 ASSAY OF PHENYTOIN TOTAL: CPT

## 2023-08-16 PROCEDURE — 84443 ASSAY THYROID STIM HORMONE: CPT

## 2023-08-16 PROCEDURE — 86341 ISLET CELL ANTIBODY: CPT

## 2023-08-16 PROCEDURE — 82607 VITAMIN B-12: CPT

## 2023-08-18 LAB
ARSENIC BLD-MCNC: <10 UG/L
BACTERIA UR CULT: NORMAL
CADMIUM BLD-MCNC: <1 UG/L
LEAD BLDV-MCNC: <2 UG/DL
MERCURY BLD-MCNC: <2.5 UG/L
SIGNIFICANT IND 70042: NORMAL
SITE SITE: NORMAL
SOURCE SOURCE: NORMAL

## 2023-08-27 LAB — TEST NAME 95000: NORMAL

## 2023-08-31 LAB — TEST NAME 95000: NORMAL

## 2023-10-22 DIAGNOSIS — G40.209 COMPLEX PARTIAL SEIZURES EVOLVING TO GENERALIZED TONIC-CLONIC SEIZURES (HCC): ICD-10-CM

## 2023-10-24 RX ORDER — PHENYTOIN SODIUM 100 MG/1
CAPSULE, EXTENDED RELEASE ORAL
Qty: 450 CAPSULE | Refills: 3 | Status: SHIPPED | OUTPATIENT
Start: 2023-10-24 | End: 2024-02-06 | Stop reason: SDUPTHER

## 2023-11-03 NOTE — ASSESSMENT & PLAN NOTE
This is a chronic problem for the patient that is ongoing.  The patient's last vitamin D level was 33 on 5/16/2017.  The patient continues to take over-the-counter vitamin D supplement.   Walk in

## 2024-01-23 DIAGNOSIS — J30.2 SEASONAL ALLERGIES: ICD-10-CM

## 2024-01-23 RX ORDER — FLUTICASONE PROPIONATE 50 MCG
SPRAY, SUSPENSION (ML) NASAL
Qty: 32 G | Refills: 0 | Status: SHIPPED | OUTPATIENT
Start: 2024-01-23 | End: 2024-03-28

## 2024-01-23 NOTE — TELEPHONE ENCOUNTER
Requested Prescriptions     Pending Prescriptions Disp Refills    fluticasone (FLONASE) 50 MCG/ACT nasal spray [Pharmacy Med Name: Fluticasone Propionate 50 MCG/ACT Nasal Suspension] 32 g 0     Sig: USE 1 SPRAY IN BOTH NOSTRILS  DAILY       HUSSEIN Lopez.

## 2024-02-06 ENCOUNTER — OFFICE VISIT (OUTPATIENT)
Dept: NEUROLOGY | Facility: MEDICAL CENTER | Age: 79
End: 2024-02-06
Attending: PSYCHIATRY & NEUROLOGY
Payer: MEDICARE

## 2024-02-06 VITALS
SYSTOLIC BLOOD PRESSURE: 116 MMHG | HEART RATE: 71 BPM | BODY MASS INDEX: 36.21 KG/M2 | DIASTOLIC BLOOD PRESSURE: 74 MMHG | OXYGEN SATURATION: 93 % | WEIGHT: 204.37 LBS | HEIGHT: 63 IN | TEMPERATURE: 98.5 F

## 2024-02-06 DIAGNOSIS — R27.0 ATAXIA: ICD-10-CM

## 2024-02-06 DIAGNOSIS — G60.8 PERIPHERAL SENSORY-MOTOR AXONAL POLYNEUROPATHY: ICD-10-CM

## 2024-02-06 DIAGNOSIS — G40.209 COMPLEX PARTIAL SEIZURES EVOLVING TO GENERALIZED TONIC-CLONIC SEIZURES (HCC): ICD-10-CM

## 2024-02-06 PROCEDURE — 99212 OFFICE O/P EST SF 10 MIN: CPT | Performed by: PSYCHIATRY & NEUROLOGY

## 2024-02-06 PROCEDURE — 99215 OFFICE O/P EST HI 40 MIN: CPT | Performed by: PSYCHIATRY & NEUROLOGY

## 2024-02-06 RX ORDER — PHENYTOIN SODIUM 100 MG/1
300 CAPSULE, EXTENDED RELEASE ORAL EVERY EVENING
Qty: 270 CAPSULE | Refills: 3 | Status: SHIPPED | OUTPATIENT
Start: 2024-02-06

## 2024-02-06 ASSESSMENT — ENCOUNTER SYMPTOMS
MEMORY LOSS: 0
SENSORY CHANGE: 1
FOCAL WEAKNESS: 0
SEIZURES: 0
TINGLING: 1
FALLS: 0
LOSS OF CONSCIOUSNESS: 0

## 2024-02-06 ASSESSMENT — PATIENT HEALTH QUESTIONNAIRE - PHQ9: CLINICAL INTERPRETATION OF PHQ2 SCORE: 0

## 2024-02-06 ASSESSMENT — FIBROSIS 4 INDEX: FIB4 SCORE: 2.13

## 2024-02-06 NOTE — PROGRESS NOTES
Subjective     Nancie Joyner is a 78 y.o. female who presents with Cristhian,  For 6-month follow-up, with a history of focal onset seizures with altered sensorium and rare secondary generalization, multifactoral gait ataxia and a peripheral sensorimotor axonal polyneuropathy.     HPI    Seizures: Stable, she has not had a seizure breakthrough in quite some time.  Originally on Dilantin 500 mg every evening, when last seen we had decided to reduce the drug dose slightly in the hopes of helping the balance problems, as well as continuing to provide benefit.  On 500 mg dose, her last drug level was 20.7, and after 2 weeks of 300 mg every evening, level was 3.4!  Levels are drawn at the same time every morning.  She has not had seizures on this lower dose.  She is compliant.    Neuropathy: She still has the numbness and tingling sensations in the feet bilaterally, especially over the dorsum of the foot where can become a little painful.  They have ascended above the ankle.  Sensory distortions have never been painful, she denies hyperesthesia in the feet.  There is still swelling in her feet.  She denies any sensory distortions in the hands.  She denies focal weakness.    Some additional blood work looking for paraneoplastic and autoimmune neuropathic illnesses proved unremarkable.  She was already had a rather complete workup for systemic autoimmune disease, vitamin deficiency states, endocrine abnormalities, toxin exposures, etc.  All of these have proven unremarkable.  EMG/NCV studies demonstrated an axonal sensorimotor polyneuropathy which is length dependent.  Vitamin D from August, 2023 was 85, B12 665 and TSH 3.9.    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  She walks slowly, looks at the ground consistently, usually is hanging onto a wall, so far or her 's arm if necessary when they are  "outside.  She can drive safely though she likes to avoid doing so.  She has not been falling with any kind of regularity.  On the lower dose of Dilantin, she has not noted any improvement over the long-term with the balance difficulties.    Medical, surgical and family histories are reviewed, there are no new drug allergies.  She is on Dilantin 3 mg every evening, Prilosec, Zyrtec, Flonase, vitamin D, B12, and a multivitamin.    Review of Systems   Cardiovascular:  Positive for leg swelling.   Musculoskeletal:  Negative for falls.   Neurological:  Positive for tingling and sensory change. Negative for focal weakness, seizures and loss of consciousness.   Psychiatric/Behavioral:  Negative for memory loss.    All other systems reviewed and are negative.    Objective     /74 (BP Location: Right arm, Patient Position: Sitting, BP Cuff Size: Adult)   Pulse 71   Temp 36.9 °C (98.5 °F) (Temporal)   Ht 1.6 m (5' 3\")   Wt 92.7 kg (204 lb 5.9 oz)   SpO2 93%   BMI 36.20 kg/m²      Physical Exam    She appears in no acute distress.  Vital signs are stable.  There is no malar rash, jaw or temporal tenderness, there is no gingival hyperplasia.  The neck is supple, range of motion is full.  Cardiac evaluation reveals a regular rhythm.  There is still lower extremity, pretibial edema.  The feet are cool to touch, capillary refill is intact, distal pulses diminished.     Neurological Exam    She is fully oriented, there is no aphasia, apraxia, or inattention.    PERRLA/EOMI, there are  no nystagmus, visual fields are full to movement detection on confrontation bilaterally.  Facial movements are symmetric, the tongue and uvula are midline without bulbar dysfunction.  Jaw movements are intact.  Sensory exam is intact to temperature and pinprick bilaterally.  Shoulder shrug and head rotation are normal.    Musculoskeletal exam reveals normal tone without tremor or drift.  Strength is intact in all 4 extremities, proximally " and distally.    Ankle jerks are absent, knee jerks are present and symmetric, in the upper extremities all reflexes are symmetric and easier to elicit at all points.  The toes are downgoing bilaterally.    She is still very unsteady as she walks, and though she can stand with her feet together and eyes closed, tandem walking is an impossibility.  There is a steppage quality as she walks, she looks at the ground continuously.  She can stand up on her heels and toes but only with real assistance from the examiner.  Repetitive movements in the feet are slowed but symmetric.  In the fingers they are of a more normal frequency and symmetric in amplitude.  There is no appendicular dystaxia throughout.    Sensory exam still reveals a stocking pattern decrement of vibration below the knees, pinprick is intact, temperature impaired below the midshin.  JPS is intact.  All sensory modalities are intact in the upper extremities.    Assessment & Plan     1. 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  There are significant activity restrictions that would be required for the 3 months following any total discontinuation protocol.  We talked about these at length, we also talked about seizure recurrence risk off drug, other options also being introducing a different drug instead of Dilantin.  For now she will stay the course.  I will check another Dilantin level to see where her baseline really is given a significant change in her level when she had only a 40% drop in overall dosing.  She is seizure-free, she is reassured that even if the level is low, as long as she is stable, there is no reason to change her regimen.    - phenytoin ER (DILANTIN) 100 MG Cap; Take 3 Capsules by mouth every evening.  Dispense: 270 Capsule; Refill: 3    2. Ataxia  Multifactoral, she was again told that the balance difficulties are likely permanent, and  though she has the neuropathy which I suspect is Dilantin related, even to stop the drug completely will not necessarily result in improvement; more likely, it we will simply prevent the balance difficulties from worsening.  Loss of sensation has resulted in her looking at the ground and needing visual input to stay balance.  She knows that darkened environments and uneven surfaces are always going to be more problematic.  The Dilantin's effect on the cerebellum which will also be a contributing issue, unfortunately is permanent as well.  Stopping the drug simply will help things from getting worse.  Given her age of 78, even if she stays on the drug, the likelihood things will profoundly worsen is still pretty small.    3. Peripheral sensory-motor axonal polyneuropathy  Likely Dilantin related, workup for other causes has been unremarkable.  Again, at best if we were to stop the drug completely, this will not progress further.  She was told that this does participate to a great degree into the ataxia that she is suffering from.  She and I will follow-up in 1 year.  If she decides to stop Dilantin, they were told to call the office so that we could discuss the issue and I can give them instructions.  Unfortunately, they are not computer savvy, so will require a phone contact in messaging.    We will see each other in 1 year.    Time: 40 minutes in total spent on patient care including pre-charting, record review, discussion with healthcare staff and documentation.  This includes face-to-face time for exam, review, discussion, as well as counseling and coordinating care.

## 2024-02-28 ENCOUNTER — OFFICE VISIT (OUTPATIENT)
Dept: MEDICAL GROUP | Facility: PHYSICIAN GROUP | Age: 79
End: 2024-02-28
Payer: MEDICARE

## 2024-02-28 ENCOUNTER — HOSPITAL ENCOUNTER (OUTPATIENT)
Dept: LAB | Facility: MEDICAL CENTER | Age: 79
End: 2024-02-28
Attending: PSYCHIATRY & NEUROLOGY
Payer: MEDICARE

## 2024-02-28 VITALS
HEART RATE: 78 BPM | HEIGHT: 63 IN | SYSTOLIC BLOOD PRESSURE: 110 MMHG | BODY MASS INDEX: 36.32 KG/M2 | TEMPERATURE: 97.6 F | WEIGHT: 205 LBS | DIASTOLIC BLOOD PRESSURE: 62 MMHG | OXYGEN SATURATION: 98 %

## 2024-02-28 DIAGNOSIS — Z78.0 POSTMENOPAUSAL: ICD-10-CM

## 2024-02-28 DIAGNOSIS — R27.0 ATAXIA: ICD-10-CM

## 2024-02-28 DIAGNOSIS — E53.8 VITAMIN B12 DEFICIENCY: ICD-10-CM

## 2024-02-28 DIAGNOSIS — Z00.00 ROUTINE HEALTH MAINTENANCE: ICD-10-CM

## 2024-02-28 DIAGNOSIS — M85.88 OSTEOPENIA OF SPINE: ICD-10-CM

## 2024-02-28 DIAGNOSIS — E66.01 MORBID OBESITY DUE TO EXCESS CALORIES (HCC): ICD-10-CM

## 2024-02-28 DIAGNOSIS — E55.9 VITAMIN D DEFICIENCY: ICD-10-CM

## 2024-02-28 DIAGNOSIS — E78.5 DYSLIPIDEMIA: ICD-10-CM

## 2024-02-28 DIAGNOSIS — G40.209 COMPLEX PARTIAL SEIZURES EVOLVING TO GENERALIZED TONIC-CLONIC SEIZURES (HCC): ICD-10-CM

## 2024-02-28 DIAGNOSIS — G62.9 SENSORY NEUROPATHY: ICD-10-CM

## 2024-02-28 DIAGNOSIS — G89.29 CHRONIC BILATERAL LOW BACK PAIN WITHOUT SCIATICA: ICD-10-CM

## 2024-02-28 DIAGNOSIS — K31.A0 GASTRIC INTESTINAL METAPLASIA: ICD-10-CM

## 2024-02-28 DIAGNOSIS — M54.50 CHRONIC BILATERAL LOW BACK PAIN WITHOUT SCIATICA: ICD-10-CM

## 2024-02-28 DIAGNOSIS — Z00.00 MEDICARE ANNUAL WELLNESS VISIT, SUBSEQUENT: ICD-10-CM

## 2024-02-28 PROBLEM — N39.46 URINARY INCONTINENCE, MIXED: Status: RESOLVED | Noted: 2023-05-18 | Resolved: 2024-02-28

## 2024-02-28 LAB — PHENYTOIN SERPL-MCNC: 7.2 UG/ML (ref 10–20)

## 2024-02-28 PROCEDURE — 3078F DIAST BP <80 MM HG: CPT | Performed by: NURSE PRACTITIONER

## 2024-02-28 PROCEDURE — 99213 OFFICE O/P EST LOW 20 MIN: CPT | Mod: 25 | Performed by: NURSE PRACTITIONER

## 2024-02-28 PROCEDURE — 1170F FXNL STATUS ASSESSED: CPT | Performed by: NURSE PRACTITIONER

## 2024-02-28 PROCEDURE — 36415 COLL VENOUS BLD VENIPUNCTURE: CPT

## 2024-02-28 PROCEDURE — G0439 PPPS, SUBSEQ VISIT: HCPCS | Performed by: NURSE PRACTITIONER

## 2024-02-28 PROCEDURE — 80185 ASSAY OF PHENYTOIN TOTAL: CPT

## 2024-02-28 PROCEDURE — 3074F SYST BP LT 130 MM HG: CPT | Performed by: NURSE PRACTITIONER

## 2024-02-28 RX ORDER — OMEPRAZOLE 20 MG/1
20 CAPSULE, DELAYED RELEASE ORAL
Qty: 90 CAPSULE | Refills: 3 | Status: SHIPPED | OUTPATIENT
Start: 2024-02-28

## 2024-02-28 ASSESSMENT — PATIENT HEALTH QUESTIONNAIRE - PHQ9: CLINICAL INTERPRETATION OF PHQ2 SCORE: 0

## 2024-02-28 ASSESSMENT — ACTIVITIES OF DAILY LIVING (ADL): BATHING_REQUIRES_ASSISTANCE: 0

## 2024-02-28 ASSESSMENT — FIBROSIS 4 INDEX: FIB4 SCORE: 2.13

## 2024-02-28 ASSESSMENT — ENCOUNTER SYMPTOMS: GENERAL WELL-BEING: GOOD

## 2024-02-28 NOTE — PROGRESS NOTES
Chief Complaint   Patient presents with    Annual Wellness Visit     HPI:  Nancie Joyner is a 78 y.o. here for Medicare Annual Wellness Visit     History of Present Illness  The patient presents for annual wellness visit. She is accompanied by her .    She feels like her back is not being managed. If she gets up and does the dishes, makes the bed, or walks, her back hurts so bad. She is not sure if it is arthritis in her spine. When she was in 4th grade, she fell and cracked her spine and developed arthritis after that. She received an injection in [place], which lasted for 2 to 3 years. When she moved back to this area, her back started hurting again. She went to a back place and none of the injections helped. She has never gone to [facility].    She is still seeing Dr. Fofana. He lowered her Dilantin from 5 to 3 capsules a day because she was taking too much. She has not had any seizures. She is not getting much exercise. She does not like to walk because it makes her back hurt. She is on Dilantin 300 mg 3 capsules a day.    She has neuropathy in her feet. It hurts to walk. Her feet start hurting after a while. At night, she has pain in her legs. She has not tried taking magnesium. It feels like cramping pain.    She has trouble with her allergies at night. It wakes her up at 2:00 in the morning because her nose gets stuffed up and she can not breathe. She coughs up phlegm all the time. She thinks it is because she is allergic to tomatoes. She still takes Zyrtec once in the morning, which seems to help.    She is allergic to TOMATOES.   Her hearing is good. She does not have hearing aids. She sees her dentist very often.     Patient Active Problem List    Diagnosis Date Noted    Incontinence without sensory awareness 05/18/2023    Recurrent UTI 05/18/2023    Atrophic vaginitis 05/18/2023    Ataxia 02/15/2023    Sensory neuropathy 02/15/2023    Memory loss of unknown cause 02/02/2023     S/P total knee arthroplasty, right 05/10/2022    Osteoarthritis of right knee 02/14/2022    Gastric intestinal metaplasia 12/07/2021    Other constipation 07/15/2021    Risk for falls 07/27/2020    Chronic foot pain, right 07/27/2020    Changing nevus 03/28/2019    Chronic bilateral low back pain without sciatica 03/28/2019    Dyslipidemia 03/28/2019    Morbid obesity due to excess calories (HCC) 03/28/2019    Chronic pain of both knees 10/10/2017    Osteopenia of spine 08/01/2016    Vitamin B12 deficiency 08/01/2016    Varicose veins of both lower extremities with pain 08/01/2016    Seasonal allergies 08/01/2016    UI (urinary incontinence) 08/01/2016    Vitamin D deficiency 08/01/2016    Complex partial seizures evolving to generalized tonic-clonic seizures (HCC) 08/01/2016     Current Outpatient Medications   Medication Sig Dispense Refill    omeprazole (PRILOSEC) 20 MG delayed-release capsule Take 1 Capsule by mouth every morning before breakfast. 90 Capsule 3    phenytoin ER (DILANTIN) 100 MG Cap Take 3 Capsules by mouth every evening. 270 Capsule 3    fluticasone (FLONASE) 50 MCG/ACT nasal spray USE 1 SPRAY IN BOTH NOSTRILS  DAILY 32 g 0    Azelastine HCl 137 MCG/SPRAY Solution as needed.      cetirizine (ZYRTEC) 10 MG Tab Take 1 Tablet by mouth every day. 90 Tablet 3    Calcium-Magnesium-Vitamin D (CALCIUM 500 PO) Take  by mouth.      Multiple Vitamin (MULTI-VITAMINS PO) Take  by mouth.      Cholecalciferol (VITAMIN D PO) Take  by mouth.      Cyanocobalamin (VITAMIN B12 PO) Take  by mouth.       No current facility-administered medications for this visit.          Current supplements as per medication list.     Allergies: Patient has no known allergies.    Current social contact/activities: Oxana, naying     She  reports that she quit smoking about 44 years ago. Her smoking use included cigarettes. She started smoking about 47 years ago. She has a 1.1 pack-year smoking history. She has never used  smokeless tobacco. She reports that she does not drink alcohol and does not use drugs.  Counseling given: Yes    ROS:    Gait: Uses a walker, cane if needed  Ostomy: No  Other tubes: No  Amputations: No  Chronic oxygen use: No  Last eye exam: 02/2022  Wears hearing aids: No   : Reports urinary leakage during the last 6 months that has interfered a lot with their daily activities or sleep.    Screening:    Depression Screening  Little interest or pleasure in doing things?  0 - not at all  Feeling down, depressed , or hopeless? 0 - not at all  Patient Health Questionnaire Score: 0     If depressive symptoms identified deferred to follow up visit unless specifically addressed in assessment and plan.    Interpretation of PHQ-9 Total Score   Score Severity   1-4 No Depression   5-9 Mild Depression   10-14 Moderate Depression   15-19 Moderately Severe Depression   20-27 Severe Depression    Screening for Cognitive Impairment  Do you or any of your friends or family members have any concern about your memory? Yes  Three Minute Recall (Banana, Sunrise, Chair) 2/3    [place] clock face with all 12 numbers and set the hands to show 20 past 8.  Yes    Cognitive concerns identified deferred for follow up unless specifically addressed in assessment and plan.    Fall Risk Assessment  Has the patient had two or more falls in the last year or any fall with injury in the last year?  No    Safety Assessment  Do you always wear your seatbelt?  Yes  Any changes to home needed to function safely? No  Difficulty hearing.  No  Patient counseled about all safety risks that were identified.    Functional Assessment ADLs  Are there any barriers preventing you from cooking for yourself or meeting nutritional needs?  No.    Are there any barriers preventing you from driving safely or obtaining transportation?  No.    Are there any barriers preventing you from using a telephone or calling for help?  No    Are there any barriers preventing you  from shopping?  No.    Are there any barriers preventing you from taking care of your own finances?  No    Are there any barriers preventing you from managing your medications?  No    Are there any barriers preventing you from showering, bathing or dressing yourself? No    Are there any barriers preventing you from doing housework or laundry? No  Are there any barriers preventing you from using the toilet?No  Are you currently engaging in any exercise or physical activity?  Yes.      Self-Assessment of Health  What is your perception of your health? Good  Do you sleep more than six hours a night? No  In the past 7 days, how much did pain keep you from doing your normal work? None  Do you spend quality time with family or friends (virtually or in person)? Yes  Do you usually eat a heart healthy diet that constists of a variety of fruits, vegetables, whole grains and fiber? Yes  Do you eat foods high in fat and/or Fast Food more than three times per week? No    Advance Care Planning  Do you have an Advance Directive, Living Will, Durable Power of , or POLST? Yes    Living Will     is not on file - instructed patient to bring in a copy to scan into their chart    Health Maintenance Summary            Scheduled - Bone Density Scan (Every 2 Years) Scheduled for 3/13/2024      08/06/2020  DS-BONE DENSITY STUDY (DEXA)    04/04/2019  DS-BONE DENSITY STUDY (DEXA)    03/03/2017  DS-BONE DENSITY STUDY (DEXA)    11/07/2005  DS-BONE DENSITY STUDY (DEXA)              Overdue - COVID-19 Vaccine (6 - 2023-24 season) Overdue since 9/1/2023 09/08/2022  Imm Admin: PFIZER BIVALENT SARS-COV-2 VACCINE (12+)    06/15/2022  Imm Admin: PFIZER ORTIZ CAP SARS-COV-2 VACCINATION (12+)    09/30/2021  Imm Admin: PFIZER PURPLE CAP SARS-COV-2 VACCINATION (12+)    02/12/2021  Imm Admin: PFIZER PURPLE CAP SARS-COV-2 VACCINATION (12+)    01/22/2021  Imm Admin: PFIZER PURPLE CAP SARS-COV-2 VACCINATION (12+)    Only the first 5 history  entries have been loaded, but more history exists.              Annual Wellness Visit (Yearly) Next due on 2/28/2025 02/28/2024  Visit Dx: Medicare annual wellness visit, subsequent    02/28/2024  Subsequent Annual Wellness Visit - Includes PPPS ()    02/23/2023  Visit Dx: Medicare annual wellness visit, subsequent    02/23/2023  Subsequent Annual Wellness Visit - Includes PPPS ()    07/27/2020  Initial Annual Wellness Visit - Includes PPPS ()    Only the first 5 history entries have been loaded, but more history exists.              IMM DTaP/Tdap/Td Vaccine (3 - Td or Tdap) Next due on 8/6/2030 08/06/2020  Imm Admin: Tdap Vaccine    07/31/2020  Imm Admin: Tdap Vaccine              Pneumococcal Vaccine: 65+ Years (Series Information) Completed      03/28/2019  Imm Admin: Pneumococcal polysaccharide vaccine (PPSV-23)    11/30/2016  Imm Admin: Pneumococcal Conjugate Vaccine (Prevnar/PCV-13)              Zoster (Shingles) Vaccines (Series Information) Completed      03/13/2020  Imm Admin: Zoster Vaccine Recombinant (RZV) (SHINGRIX)    11/12/2019  Imm Admin: Zoster Vaccine Recombinant (RZV) (SHINGRIX)    11/30/2016  Imm Admin: Zoster Vaccine Live (ZVL) (Zostavax) - HISTORICAL DATA              Influenza Vaccine (Series Information) Completed      10/19/2023  Imm Admin: Influenza Vaccine Adult HD    10/13/2022  Imm Admin: Influenza Vaccine Adult HD    10/07/2021  Imm Admin: Influenza Vaccine Adult HD    10/08/2020  Imm Admin: Influenza Vaccine Adult HD    10/03/2019  Imm Admin: Influenza Vaccine Adult HD    Only the first 5 history entries have been loaded, but more history exists.              Hepatitis A Vaccine (Hep A) (Series Information) Aged Out      No completion history exists for this topic.              Hepatitis B Vaccine (Hep B) (Series Information) Aged Out      No completion history exists for this topic.              HPV Vaccines (Series Information) Aged Out      No completion  history exists for this topic.              Polio Vaccine (Inactivated Polio) (Series Information) Aged Out      No completion history exists for this topic.              Meningococcal Immunization (Series Information) Aged Out      No completion history exists for this topic.              Discontinued - Mammogram  Discontinued        Frequency changed to Never automatically (Topic No Longer Applies)    2021  MA-SCREENING MAMMO BILAT W/TOMOSYNTHESIS W/CAD    2019  MA-SCREENING MAMMO BILAT W/TOMOSYNTHESIS W/CAD    2018  MA-SCREENING MAMMO BILAT W/TOMOSYNTHESIS W/CAD    2016  MA-SCREEN MAMMO W/CAD-BILAT    Only the first 5 history entries have been loaded, but more history exists.              Discontinued - Colorectal Cancer Screening  Discontinued        Frequency changed to Never automatically (Topic No Longer Applies)    2021  Colonoscopy (Done)    2014  REFERRAL TO GI FOR COLONOSCOPY              Discontinued - Hepatitis C Screening  Discontinued        Frequency changed to Never automatically (Topic No Longer Applies)    2023  Hepatitis C Antibody component of HEP C VIRUS ANTIBODY                  Patient Care Team:  MOUNA Lopez as PCP - General (Family Medicine)  Miki Fofana M.D. as Consulting Physician (Neurology)    Social History     Tobacco Use    Smoking status: Former     Current packs/day: 0.00     Average packs/day: 0.5 packs/day for 2.2 years (1.1 ttl pk-yrs)     Types: Cigarettes     Start date: 1977     Quit date: 3/28/1979     Years since quittin.9    Smokeless tobacco: Never   Vaping Use    Vaping Use: Never used   Substance Use Topics    Alcohol use: No     Alcohol/week: 0.0 oz    Drug use: No     Family History   Problem Relation Age of Onset    Cancer Mother         STOMACH    Lung Cancer Father     Cancer Sister         sinus    Alzheimer's Disease Sister     Heart Disease Sister     Cancer Sister     Heart Disease Sister  "    Breast Cancer Sister     Cancer Brother         pancreatic cancer    Seizures Paternal Aunt     No Known Problems Maternal Grandmother     No Known Problems Maternal Grandfather     No Known Problems Paternal Grandmother     No Known Problems Paternal Grandfather     Diabetes Daughter      She  has a past medical history of Arthritis, Basal cell carcinoma (2015), Breath shortness, CKD (chronic kidney disease) stage 3, GFR 30-59 ml/min (HCC) (8/1/2016), Dental disorder, Epilepsy (HCC) (8/1/2016), Heart burn, Osteopenia (8/1/2016), Pneumonia (2015), Seasonal allergies (8/1/2016), Seizure disorder (HCC), UI (urinary incontinence) (8/1/2016), Varicose veins (8/1/2016), Vitamin B12 deficiency (8/1/2016), and Vitamin D deficiency (8/1/2016).   Past Surgical History:   Procedure Laterality Date    PB TOTAL KNEE ARTHROPLASTY Right 5/10/2022    Procedure: Right total knee arthroplasty;  Surgeon: Jam Lowe M.D.;  Location: SURGERY [facility];  Service: Orthopedics    CHOLECYSTECTOMY  1989    TUBAL COAGULATION LAPAROSCOPIC BILATERAL  1979    ABDOMINAL HYSTERECTOMY TOTAL      VEIN STRIPPING       Exam:   /62   Pulse 78   Temp 36.4 °C (97.6 °F) (Temporal)   Ht 1.6 m (5' 3\")   Wt 93 kg (205 lb)   SpO2 98%  Body mass index is 36.31 kg/m².    Hearing good.    Dentition good  Alert, oriented in no acute distress.  Eye contact is good, speech goal directed, affect calm    Assessment and Plan. The following treatment and monitoring plan is recommended:    1. Medicare annual wellness visit, subsequent  - Subsequent Annual Wellness Visit - Includes PPPS ()    2. Morbid obesity due to excess calories ([facility])  3. BMI 36.0-36.9,adult  Chronic, ongoing.  Encourage diet high in fruits, vegetables, and fiber. And a diet low in salt, refined carbohydrates, cholesterol, saturated fat, and trans fatty acids.    Encourage a minimum of 30 minutes of moderate intensity aerobic exercise (eg, brisk walking) is recommended on " five days each week. Or 30 minutes of vigorous-intensity aerobic exercise (eg, jogging) on three days each week.   Patient's body mass index is 36.31 kg/m². Exercise and nutrition counseling were performed at this visit.  Due for updated annual labs in September 2024 prior to follow-up.  - Patient identified as having weight management issue.  Appropriate orders and counseling given.  - CBC WITH DIFFERENTIAL; Future  - Comp Metabolic Panel; Future  - Lipid Profile; Future  - TSH WITH REFLEX TO FT4; Future    4. Complex partial seizures evolving to generalized tonic-clonic seizures (HCC)  Chronic, ongoing.  Continue to follow with neurology, Dr. Fofana.  Continue phenytoin  mg every evening as directed.    5. Dyslipidemia  Chronic, ongoing without medication.  Encourage healthy diet, activity as tolerated, weight loss. Due for updated annual labs in September 2024 prior to follow-up.  - Comp Metabolic Panel; Future  - Lipid Profile; Future  - TSH WITH REFLEX TO FT4; Future    6. Sensory neuropathy  Due for updated annual labs in September 2024 prior to follow-up.  - CBC WITH DIFFERENTIAL; Future  - TSH WITH REFLEX TO FT4; Future  - VITAMIN D,25 HYDROXY (DEFICIENCY); Future  - VITAMIN B12; Future    7. Chronic bilateral low back pain without sciatica  Chronic, uncontrolled.  Referral to orthopedics for evaluation and treatment.  - Referral to Orthopedics    8. Gastric intestinal metaplasia  Chronic, ongoing.  Continue omeprazole 20 mg daily. Due for updated annual labs in September 2024 prior to follow-up.  - CBC WITH DIFFERENTIAL; Future  - Comp Metabolic Panel; Future  - TSH WITH REFLEX TO FT4; Future  - VITAMIN D,25 HYDROXY (DEFICIENCY); Future  - VITAMIN B12; Future  - omeprazole (PRILOSEC) 20 MG delayed-release capsule; Take 1 Capsule by mouth every morning before breakfast.  Dispense: 90 Capsule; Refill: 3    9. Vitamin D deficiency  Chronic, ongoing.  Continue over-the-counter vitamin D and calcium  supplement daily. Due for updated annual labs in September 2024 prior to follow-up.  - VITAMIN D,25 HYDROXY (DEFICIENCY); Future    10. Vitamin B12 deficiency  Chronic, ongoing.  Continue over-the-counter vitamin B12 supplement daily. Due for updated annual labs in September 2024 prior to follow-up.  - CBC WITH DIFFERENTIAL; Future  - VITAMIN B12; Future    11. Osteopenia of spine  12. Postmenopausal  Chronic, ongoing. Encourage patient to continue to take vitamin d and calcium supplement daily. Encourage a minimum of 150 minutes of weight bearing exercises weekly. Due for updated DEXA in March 2024.    - DS-BONE DENSITY STUDY (DEXA); Future    13. Routine health maintenance  Due for updated annual labs in September 2024 prior to follow-up.  - CBC WITH DIFFERENTIAL; Future  - Comp Metabolic Panel; Future  - Lipid Profile; Future  - TSH WITH REFLEX TO FT4; Future  - VITAMIN D,25 HYDROXY (DEFICIENCY); Future  - VITAMIN B12; Future      Services suggested: No services needed at this time  Health Care Screening: Age-appropriate preventive services recommended by USPTF and ACIP covered by Medicare were discussed today. Services ordered if indicated and agreed upon by the patient.  Referrals offered: Community-based lifestyle interventions to reduce health risks and promote self-management and wellness, fall prevention, nutrition, physical activity, tobacco-use cessation, weight loss, and mental health services as per orders if indicated.    Discussion today about general wellness and lifestyle habits:    Prevent falls and reduce trip hazards; Cautioned about securing or removing rugs.  Have a working fire alarm and carbon monoxide detector;   Engage in regular physical activity and social activities     Follow-up: Return in about 6 months (around 9/3/2024) for Follow up Labs.     Please note that this dictation was created using voice recognition software. I have worked with consultants from the vendor as well as  technical experts from [facility] to optimize the interface. I have made every reasonable attempt to correct obvious errors, but I expect that there are errors of grammar and possibly content that I did not discover before finalizing the note.    Verbal consent was acquired by the patient to use [employer] ambient listening note generation during this visit Yes

## 2024-02-29 ENCOUNTER — APPOINTMENT (OUTPATIENT)
Dept: RADIOLOGY | Facility: MEDICAL CENTER | Age: 79
End: 2024-02-29
Attending: STUDENT IN AN ORGANIZED HEALTH CARE EDUCATION/TRAINING PROGRAM
Payer: MEDICARE

## 2024-02-29 ENCOUNTER — HOSPITAL ENCOUNTER (EMERGENCY)
Facility: MEDICAL CENTER | Age: 79
End: 2024-02-29
Attending: STUDENT IN AN ORGANIZED HEALTH CARE EDUCATION/TRAINING PROGRAM
Payer: MEDICARE

## 2024-02-29 VITALS
BODY MASS INDEX: 36.32 KG/M2 | DIASTOLIC BLOOD PRESSURE: 87 MMHG | SYSTOLIC BLOOD PRESSURE: 174 MMHG | WEIGHT: 205 LBS | HEART RATE: 72 BPM | RESPIRATION RATE: 14 BRPM | OXYGEN SATURATION: 97 % | HEIGHT: 63 IN | TEMPERATURE: 97.5 F

## 2024-02-29 DIAGNOSIS — S42.252A CLOSED DISPLACED FRACTURE OF GREATER TUBEROSITY OF LEFT HUMERUS, INITIAL ENCOUNTER: ICD-10-CM

## 2024-02-29 PROCEDURE — A9270 NON-COVERED ITEM OR SERVICE: HCPCS | Performed by: STUDENT IN AN ORGANIZED HEALTH CARE EDUCATION/TRAINING PROGRAM

## 2024-02-29 PROCEDURE — 73030 X-RAY EXAM OF SHOULDER: CPT | Mod: LT

## 2024-02-29 PROCEDURE — 96375 TX/PRO/DX INJ NEW DRUG ADDON: CPT

## 2024-02-29 PROCEDURE — 99285 EMERGENCY DEPT VISIT HI MDM: CPT

## 2024-02-29 PROCEDURE — 73060 X-RAY EXAM OF HUMERUS: CPT | Mod: LT

## 2024-02-29 PROCEDURE — 96374 THER/PROPH/DIAG INJ IV PUSH: CPT

## 2024-02-29 PROCEDURE — 73070 X-RAY EXAM OF ELBOW: CPT | Mod: LT

## 2024-02-29 PROCEDURE — 700111 HCHG RX REV CODE 636 W/ 250 OVERRIDE (IP): Mod: JZ | Performed by: STUDENT IN AN ORGANIZED HEALTH CARE EDUCATION/TRAINING PROGRAM

## 2024-02-29 PROCEDURE — 700102 HCHG RX REV CODE 250 W/ 637 OVERRIDE(OP): Performed by: STUDENT IN AN ORGANIZED HEALTH CARE EDUCATION/TRAINING PROGRAM

## 2024-02-29 RX ORDER — ACETAMINOPHEN 500 MG
1000 TABLET ORAL EVERY 6 HOURS PRN
Qty: 30 TABLET | Refills: 0 | Status: SHIPPED | OUTPATIENT
Start: 2024-02-29

## 2024-02-29 RX ORDER — KETOROLAC TROMETHAMINE 15 MG/ML
15 INJECTION, SOLUTION INTRAMUSCULAR; INTRAVENOUS ONCE
Status: COMPLETED | OUTPATIENT
Start: 2024-02-29 | End: 2024-02-29

## 2024-02-29 RX ORDER — ACETAMINOPHEN 500 MG
1000 TABLET ORAL ONCE
Status: COMPLETED | OUTPATIENT
Start: 2024-02-29 | End: 2024-02-29

## 2024-02-29 RX ADMIN — KETOROLAC TROMETHAMINE 15 MG: 15 INJECTION, SOLUTION INTRAMUSCULAR; INTRAVENOUS at 20:55

## 2024-02-29 RX ADMIN — ACETAMINOPHEN 1000 MG: 500 TABLET ORAL at 20:55

## 2024-02-29 RX ADMIN — FENTANYL CITRATE 25 MCG: 50 INJECTION, SOLUTION INTRAMUSCULAR; INTRAVENOUS at 20:55

## 2024-02-29 ASSESSMENT — PAIN DESCRIPTION - PAIN TYPE: TYPE: ACUTE PAIN

## 2024-02-29 ASSESSMENT — FIBROSIS 4 INDEX: FIB4 SCORE: 2.13

## 2024-03-01 NOTE — ED NOTES
PT brought back to room via WC. PT placed on monitor, and call light is within reach. Chart is up for any available ERP.

## 2024-03-01 NOTE — ED TRIAGE NOTES
Chief Complaint   Patient presents with    Arm Injury     Pt c/o L arm proximal pain with movement.  Cms intact distally

## 2024-03-01 NOTE — ED NOTES
Pt signed and given d/c papers. Discussed tylenol for pain and calling ortho in the AM for f/u. Also reviewed wearing sling at all times per ERP  Pt and  deny further questions/concerns. Pt ambulated out of the dept w/ steady gait A&O x4 w/ all belongings to lobby exit,  is driving her home

## 2024-03-01 NOTE — DISCHARGE INSTRUCTIONS
Please wear your sling and take Tylenol 1 to 2 tablets every 6 hours.    Call the attached number to schedule a follow-up appointment with the orthopedic surgeon.

## 2024-03-01 NOTE — ASSESSMENT & PLAN NOTE
Chronic, ongoing.  Continues to follow with neurology, Dr. Fofana, next appointment February 2025.  Continues phenytoin  mg nightly.

## 2024-03-01 NOTE — ED PROVIDER NOTES
ED Provider Note    CHIEF COMPLAINT  Chief Complaint   Patient presents with    Arm Injury     Pt c/o L arm proximal pain with movement.  Cms intact distally       EXTERNAL RECORDS REVIEWED  Outpatient Notes patient seen by family practitioner yesterday for an annual check.  Noted history of chronic back pain and neuropathy of her feet making walking painful.    HPI/ROS  LIMITATION TO HISTORY   Select: : None      Nancie Joyner is a 78 y.o. female who presents to the emergency department for evaluation of left arm pain.  Patient states that she tripped and fell landing on her left arm which she attempted to steady herself with around 6:00 PM this evening.  She did not strike her head nor lose consciousness.  She reports that she is unable to lift her arm at the shoulder secondary to pain.  She reports she can bend at her elbow and wrist and denies pain at the sites but states that the pain from her left shoulder radiates down her arm to the elbow.  She denies pain at any additional sites.  She has never injured this arm before.  She does not take blood thinners.    PAST MEDICAL HISTORY   has a past medical history of Arthritis, Basal cell carcinoma (2015), Breath shortness, CKD (chronic kidney disease) stage 3, GFR 30-59 ml/min ([facility]) (8/1/2016), Dental disorder, Epilepsy ([facility]) (8/1/2016), Heart burn, Osteopenia (8/1/2016), Pneumonia (2015), Seasonal allergies (8/1/2016), Seizure disorder ([facility]), UI (urinary incontinence) (8/1/2016), Varicose veins (8/1/2016), Vitamin B12 deficiency (8/1/2016), and Vitamin D deficiency (8/1/2016).    SURGICAL HISTORY   has a past surgical history that includes vein stripping; abdominal hysterectomy total; cholecystectomy (1989); tubal coagulation laparoscopic bilateral (1979); and total knee arthroplasty (Right, 5/10/2022).    FAMILY HISTORY  Family History   Problem Relation Age of Onset    Cancer Mother         STOMACH    Lung Cancer Father     Cancer Sister         sinus  "   Alzheimer's Disease Sister     Heart Disease Sister     Cancer Sister     Heart Disease Sister     Breast Cancer Sister     Cancer Brother         pancreatic cancer    Seizures Paternal Aunt     No Known Problems Maternal Grandmother     No Known Problems Maternal Grandfather     No Known Problems Paternal Grandmother     No Known Problems Paternal Grandfather     Diabetes Daughter        SOCIAL HISTORY  Social History     Tobacco Use    Smoking status: Former     Current packs/day: 0.00     Average packs/day: 0.5 packs/day for 2.2 years (1.1 ttl pk-yrs)     Types: Cigarettes     Start date: 1977     Quit date: 3/28/1979     Years since quittin.9    Smokeless tobacco: Never   Vaping Use    Vaping Use: Never used   Substance and Sexual Activity    Alcohol use: No     Alcohol/week: 0.0 oz    Drug use: No    Sexual activity: Not Currently     Partners: Male       CURRENT MEDICATIONS  Home Medications    **Home medications have not yet been reviewed for this encounter**         ALLERGIES  No Known Allergies    PHYSICAL EXAM  VITAL SIGNS: BP (!) 174/87   Pulse 96   Temp (!) 35.6 °C (96 °F) (Temporal)   Resp 16   Ht 1.6 m (5' 3\")   Wt 93 kg (205 lb)   SpO2 93%   BMI 36.31 kg/m²    Constitutional: No acute distress, pleasant, uncomfortable with movement.  HENT: Normocephalic, Atraumatic  Cardiovascular: 2+ left radial pulse on the left  Musculoskeletal: Limited range of motion at the left shoulder secondary to pain and bony deformity of the proximal humerus with tenderness at this location.  Normal range of motion left elbow and left wrist, left fingers of the left hand.  No additional bony deformity or tenderness.  Skin: Warm, Dry, No erythema, No rash.  No skin tenting  Neurologic: Alert and oriented x 3, normal sensation with axillary median radial and ulnar nerve testing on the left.  Strength of axillary nerve unable to be evaluated secondary to pain but otherwise normal strength of the left upper " extremity.  Psychiatric: Appropriate affect for situation      DIAGNOSTIC STUDIES / PROCEDURES    RADIOLOGY  I have independently interpreted the diagnostic imaging associated with this visit and am waiting the final reading from the radiologist.   My preliminary interpretation is as follows: Acute mildly displaced greater tuberosity fracture of the left humerus  Radiologist interpretation:   DX-HUMERUS 2+ LEFT   Final Result      Acute mildly displaced greater tuberosity fracture.      DX-SHOULDER 2+ LEFT   Final Result      Acute mildly displaced greater tuberosity fracture.      DX-ELBOW-LIMITED 2- LEFT   Final Result      No acute osseous abnormality.            COURSE & MEDICAL DECISION MAKING    ED Observation Status? No; Patient does not meet criteria for ED Observation.     INITIAL ASSESSMENT, COURSE AND PLAN  Care Narrative:     Patient presents to the emergency department for evaluation of left proximal upper arm pain after a fall onto the area prior to arrival.  Concern for glenohumeral dislocation, proximal humerus fracture, fracture-dislocation.  No evidence of neurovascular injury on examination.  Will obtain x-rays of the humerus shoulder and elbow to further assess.  Will provide pain control with Tylenol Toradol and fentanyl.    X-rays demonstrate mildly displaced greater tuberosity fracture of the proximal humerus.  No additional traumatic injuries appreciated.  On reassessment pain is well-controlled following analgesia as above.  Discussed plan to place patient into a sling and arrange for outpatient orthopedic follow-up.  Had a long discussion with patient and  regarding patient's suspected ability to complete her activities of daily living in the setting of this injury.  She feels very confident that she will be fine at home as does her  who she lives with and states can assist her.  They are requesting discharge.  Patient discharged with a prescription for Tylenol which she  believes will be appropriate for treatment of her pain.  Return precautions discussed and all questions answered and the patient was discharged stable condition.      ADDITIONAL PROBLEM LIST  Chronic back pain and neuropathy    DISPOSITION AND DISCUSSIONS  I have discussed management of the patient with the following physicians and SRINIVAS's: None    Discussion of management with other QHP or appropriate source(s): None     Escalation of care considered, and ultimately not performed:acute inpatient care management, however at this time, the patient is most appropriate for outpatient management    Decision tools and prescription drugs considered including, but not limited to: Pain Medications Toradol fentanyl and Tylenol .    FINAL IMPRESSION  1. Closed displaced fracture of greater tuberosity of left humerus, initial encounter        PRESCRIPTIONS  Discharge Medication List as of 2/29/2024  9:27 PM        START taking these medications    Details   acetaminophen (TYLENOL) 500 MG Tab Take 2 Tablets by mouth every 6 hours as needed for Mild Pain or Moderate Pain., Disp-30 Tablet, R-0, Normal             FOLLOW UP  Misbah Beebe M.D.  [address]  [address]  [facility] [id number] [phone]    Schedule an appointment as soon as possible for a visit       MOUNA Lopez  [address]  [place] [id number] [phone]    Schedule an appointment as soon as possible for a visit           -DISCHARGE-      Electronically signed by: Moi Powell M.D., 2/29/2024 8:26 PM

## 2024-03-13 ENCOUNTER — HOSPITAL ENCOUNTER (OUTPATIENT)
Dept: RADIOLOGY | Facility: MEDICAL CENTER | Age: 79
End: 2024-03-13
Attending: NURSE PRACTITIONER
Payer: MEDICARE

## 2024-03-13 DIAGNOSIS — M85.88 OSTEOPENIA OF SPINE: ICD-10-CM

## 2024-03-13 DIAGNOSIS — Z78.0 POSTMENOPAUSAL: ICD-10-CM

## 2024-03-13 PROCEDURE — 77080 DXA BONE DENSITY AXIAL: CPT

## 2024-03-28 DIAGNOSIS — J30.2 SEASONAL ALLERGIES: ICD-10-CM

## 2024-03-28 RX ORDER — FLUTICASONE PROPIONATE 50 MCG
SPRAY, SUSPENSION (ML) NASAL
Qty: 32 G | Refills: 3 | Status: SHIPPED | OUTPATIENT
Start: 2024-03-28

## 2024-03-28 NOTE — TELEPHONE ENCOUNTER
Requested Prescriptions     Pending Prescriptions Disp Refills    fluticasone (FLONASE) 50 MCG/ACT nasal spray [Pharmacy Med Name: Fluticasone Propionate 50 MCG/ACT Nasal Suspension] 32 g 3     Sig: USE 1 SPRAY IN BOTH NOSTRILS  DAILY       HUSSEIN Lopez.

## 2024-07-17 ENCOUNTER — HOSPITAL ENCOUNTER (OUTPATIENT)
Dept: LAB | Facility: MEDICAL CENTER | Age: 79
End: 2024-07-17
Attending: NURSE PRACTITIONER
Payer: MEDICARE

## 2024-07-17 DIAGNOSIS — E53.8 VITAMIN B12 DEFICIENCY: ICD-10-CM

## 2024-07-17 DIAGNOSIS — Z00.00 ROUTINE HEALTH MAINTENANCE: ICD-10-CM

## 2024-07-17 DIAGNOSIS — E78.5 DYSLIPIDEMIA: ICD-10-CM

## 2024-07-17 DIAGNOSIS — K31.A0 GASTRIC INTESTINAL METAPLASIA: ICD-10-CM

## 2024-07-17 DIAGNOSIS — E55.9 VITAMIN D DEFICIENCY: ICD-10-CM

## 2024-07-17 DIAGNOSIS — E66.01 MORBID OBESITY DUE TO EXCESS CALORIES (HCC): ICD-10-CM

## 2024-07-17 DIAGNOSIS — G62.9 SENSORY NEUROPATHY: ICD-10-CM

## 2024-07-17 LAB
25(OH)D3 SERPL-MCNC: 78 NG/ML (ref 30–100)
ALBUMIN SERPL BCP-MCNC: 4 G/DL (ref 3.2–4.9)
ALBUMIN/GLOB SERPL: 1.3 G/DL
ALP SERPL-CCNC: 94 U/L (ref 30–99)
ALT SERPL-CCNC: 19 U/L (ref 2–50)
ANION GAP SERPL CALC-SCNC: 10 MMOL/L (ref 7–16)
AST SERPL-CCNC: 22 U/L (ref 12–45)
BASOPHILS # BLD AUTO: 0.9 % (ref 0–1.8)
BASOPHILS # BLD: 0.05 K/UL (ref 0–0.12)
BILIRUB SERPL-MCNC: 0.3 MG/DL (ref 0.1–1.5)
BUN SERPL-MCNC: 20 MG/DL (ref 8–22)
CALCIUM ALBUM COR SERPL-MCNC: 9.3 MG/DL (ref 8.5–10.5)
CALCIUM SERPL-MCNC: 9.3 MG/DL (ref 8.5–10.5)
CHLORIDE SERPL-SCNC: 105 MMOL/L (ref 96–112)
CHOLEST SERPL-MCNC: 184 MG/DL (ref 100–199)
CO2 SERPL-SCNC: 27 MMOL/L (ref 20–33)
CREAT SERPL-MCNC: 0.88 MG/DL (ref 0.5–1.4)
EOSINOPHIL # BLD AUTO: 0.12 K/UL (ref 0–0.51)
EOSINOPHIL NFR BLD: 2.2 % (ref 0–6.9)
ERYTHROCYTE [DISTWIDTH] IN BLOOD BY AUTOMATED COUNT: 47.1 FL (ref 35.9–50)
GFR SERPLBLD CREATININE-BSD FMLA CKD-EPI: 66 ML/MIN/1.73 M 2
GLOBULIN SER CALC-MCNC: 3.1 G/DL (ref 1.9–3.5)
GLUCOSE SERPL-MCNC: 93 MG/DL (ref 65–99)
HCT VFR BLD AUTO: 45 % (ref 37–47)
HDLC SERPL-MCNC: 60 MG/DL
HGB BLD-MCNC: 15 G/DL (ref 12–16)
IMM GRANULOCYTES # BLD AUTO: 0.01 K/UL (ref 0–0.11)
IMM GRANULOCYTES NFR BLD AUTO: 0.2 % (ref 0–0.9)
LDLC SERPL CALC-MCNC: 109 MG/DL
LYMPHOCYTES # BLD AUTO: 1.65 K/UL (ref 1–4.8)
LYMPHOCYTES NFR BLD: 30.3 % (ref 22–41)
MCH RBC QN AUTO: 33.7 PG (ref 27–33)
MCHC RBC AUTO-ENTMCNC: 33.3 G/DL (ref 32.2–35.5)
MCV RBC AUTO: 101.1 FL (ref 81.4–97.8)
MONOCYTES # BLD AUTO: 0.43 K/UL (ref 0–0.85)
MONOCYTES NFR BLD AUTO: 7.9 % (ref 0–13.4)
NEUTROPHILS # BLD AUTO: 3.19 K/UL (ref 1.82–7.42)
NEUTROPHILS NFR BLD: 58.5 % (ref 44–72)
NRBC # BLD AUTO: 0 K/UL
NRBC BLD-RTO: 0 /100 WBC (ref 0–0.2)
PLATELET # BLD AUTO: 214 K/UL (ref 164–446)
PMV BLD AUTO: 10.5 FL (ref 9–12.9)
POTASSIUM SERPL-SCNC: 4.4 MMOL/L (ref 3.6–5.5)
PROT SERPL-MCNC: 7.1 G/DL (ref 6–8.2)
RBC # BLD AUTO: 4.45 M/UL (ref 4.2–5.4)
SODIUM SERPL-SCNC: 142 MMOL/L (ref 135–145)
TRIGL SERPL-MCNC: 77 MG/DL (ref 0–149)
TSH SERPL DL<=0.005 MIU/L-ACNC: 3.66 UIU/ML (ref 0.38–5.33)
VIT B12 SERPL-MCNC: 966 PG/ML (ref 211–911)
WBC # BLD AUTO: 5.5 K/UL (ref 4.8–10.8)

## 2024-07-17 PROCEDURE — 85025 COMPLETE CBC W/AUTO DIFF WBC: CPT

## 2024-07-17 PROCEDURE — 36415 COLL VENOUS BLD VENIPUNCTURE: CPT

## 2024-07-17 PROCEDURE — 82306 VITAMIN D 25 HYDROXY: CPT

## 2024-07-17 PROCEDURE — 82607 VITAMIN B-12: CPT

## 2024-07-17 PROCEDURE — 80053 COMPREHEN METABOLIC PANEL: CPT

## 2024-07-17 PROCEDURE — 80061 LIPID PANEL: CPT

## 2024-07-17 PROCEDURE — 84443 ASSAY THYROID STIM HORMONE: CPT

## 2024-07-24 ENCOUNTER — APPOINTMENT (RX ONLY)
Dept: URBAN - METROPOLITAN AREA CLINIC 22 | Facility: CLINIC | Age: 79
Setting detail: DERMATOLOGY
End: 2024-07-24

## 2024-07-24 DIAGNOSIS — L82.1 OTHER SEBORRHEIC KERATOSIS: ICD-10-CM

## 2024-07-24 DIAGNOSIS — L82.0 INFLAMED SEBORRHEIC KERATOSIS: ICD-10-CM

## 2024-07-24 DIAGNOSIS — L81.4 OTHER MELANIN HYPERPIGMENTATION: ICD-10-CM

## 2024-07-24 DIAGNOSIS — D22 MELANOCYTIC NEVI: ICD-10-CM

## 2024-07-24 DIAGNOSIS — Z85.828 PERSONAL HISTORY OF OTHER MALIGNANT NEOPLASM OF SKIN: ICD-10-CM

## 2024-07-24 DIAGNOSIS — L57.0 ACTINIC KERATOSIS: ICD-10-CM

## 2024-07-24 DIAGNOSIS — L91.8 OTHER HYPERTROPHIC DISORDERS OF THE SKIN: ICD-10-CM

## 2024-07-24 DIAGNOSIS — L44.8 OTHER SPECIFIED PAPULOSQUAMOUS DISORDERS: ICD-10-CM

## 2024-07-24 PROBLEM — D22.5 MELANOCYTIC NEVI OF TRUNK: Status: ACTIVE | Noted: 2024-07-24

## 2024-07-24 PROCEDURE — ? SUNSCREEN RECOMMENDATIONS

## 2024-07-24 PROCEDURE — 99213 OFFICE O/P EST LOW 20 MIN: CPT | Mod: 25

## 2024-07-24 PROCEDURE — 17000 DESTRUCT PREMALG LESION: CPT | Mod: 59

## 2024-07-24 PROCEDURE — ? COUNSELING

## 2024-07-24 PROCEDURE — ? LIQUID NITROGEN

## 2024-07-24 PROCEDURE — 17110 DESTRUCTION B9 LES UP TO 14: CPT

## 2024-07-24 ASSESSMENT — LOCATION DETAILED DESCRIPTION DERM
LOCATION DETAILED: SUPERIOR THORACIC SPINE
LOCATION DETAILED: LEFT DISTAL LATERAL POSTERIOR UPPER ARM
LOCATION DETAILED: LEFT MEDIAL MALAR CHEEK
LOCATION DETAILED: SUPERIOR LUMBAR SPINE
LOCATION DETAILED: LEFT LATERAL SUPERIOR EYELID
LOCATION DETAILED: LEFT VENTRAL PROXIMAL FOREARM
LOCATION DETAILED: RIGHT VENTRAL PROXIMAL FOREARM
LOCATION DETAILED: INFERIOR MID FOREHEAD
LOCATION DETAILED: RIGHT DISTAL DORSAL FOREARM
LOCATION DETAILED: RIGHT INFERIOR CENTRAL MALAR CHEEK
LOCATION DETAILED: EPIGASTRIC SKIN
LOCATION DETAILED: LEFT CLAVICULAR NECK
LOCATION DETAILED: LEFT MEDIAL SUPERIOR EYELID

## 2024-07-24 ASSESSMENT — LOCATION SIMPLE DESCRIPTION DERM
LOCATION SIMPLE: LEFT SUPERIOR EYELID
LOCATION SIMPLE: INFERIOR FOREHEAD
LOCATION SIMPLE: LEFT CHEEK
LOCATION SIMPLE: LEFT ANTERIOR NECK
LOCATION SIMPLE: RIGHT CHEEK
LOCATION SIMPLE: LEFT POSTERIOR UPPER ARM
LOCATION SIMPLE: RIGHT FOREARM
LOCATION SIMPLE: UPPER BACK
LOCATION SIMPLE: LEFT FOREARM
LOCATION SIMPLE: LOWER BACK
LOCATION SIMPLE: ABDOMEN

## 2024-07-24 ASSESSMENT — LOCATION ZONE DERM
LOCATION ZONE: EYELID
LOCATION ZONE: FACE
LOCATION ZONE: TRUNK
LOCATION ZONE: NECK
LOCATION ZONE: ARM

## 2024-07-24 NOTE — PROCEDURE: SUNSCREEN RECOMMENDATIONS

## 2024-07-24 NOTE — PROCEDURE: LIQUID NITROGEN
Show Applicator Variable?: Yes
Render Post-Care Instructions In Note?: no
Spray Paint Text: The liquid nitrogen was applied to the skin utilizing a spray paint frosting technique.
Medical Necessity Information: It is in your best interest to select a reason for this procedure from the list below. All of these items fulfill various CMS LCD requirements except the new and changing color options.
Number Of Freeze-Thaw Cycles: 2 freeze-thaw cycles
Medical Necessity Clause: This procedure was medically necessary because the lesions that were treated were:
Post-Care Instructions: I reviewed with the patient in detail post-care instructions. Patient is to wear sunprotection, and avoid picking at any of the treated lesions. Pt may apply Vaseline to crusted or scabbing areas.
Consent: The patient's consent was obtained including but not limited to risks of crusting, scabbing, blistering, scarring, darker or lighter pigmentary change, recurrence, incomplete removal and infection.
Detail Level: Detailed
Duration Of Freeze Thaw-Cycle (Seconds): 0

## 2024-09-10 ENCOUNTER — APPOINTMENT (OUTPATIENT)
Dept: MEDICAL GROUP | Facility: PHYSICIAN GROUP | Age: 79
End: 2024-09-10
Payer: MEDICARE

## 2024-09-10 VITALS
HEIGHT: 63 IN | HEART RATE: 90 BPM | OXYGEN SATURATION: 90 % | BODY MASS INDEX: 36.5 KG/M2 | SYSTOLIC BLOOD PRESSURE: 112 MMHG | TEMPERATURE: 98.1 F | DIASTOLIC BLOOD PRESSURE: 62 MMHG | WEIGHT: 206 LBS

## 2024-09-10 DIAGNOSIS — E78.5 DYSLIPIDEMIA: ICD-10-CM

## 2024-09-10 DIAGNOSIS — N39.46 MIXED STRESS AND URGE URINARY INCONTINENCE: ICD-10-CM

## 2024-09-10 DIAGNOSIS — K59.09 OTHER CONSTIPATION: ICD-10-CM

## 2024-09-10 DIAGNOSIS — Z74.09 LIMITED MOBILITY: ICD-10-CM

## 2024-09-10 DIAGNOSIS — K31.A0 GASTRIC INTESTINAL METAPLASIA: ICD-10-CM

## 2024-09-10 DIAGNOSIS — N39.42 INCONTINENCE WITHOUT SENSORY AWARENESS: ICD-10-CM

## 2024-09-10 PROCEDURE — 99214 OFFICE O/P EST MOD 30 MIN: CPT | Performed by: NURSE PRACTITIONER

## 2024-09-10 PROCEDURE — 3078F DIAST BP <80 MM HG: CPT | Performed by: NURSE PRACTITIONER

## 2024-09-10 PROCEDURE — 1170F FXNL STATUS ASSESSED: CPT | Performed by: NURSE PRACTITIONER

## 2024-09-10 PROCEDURE — 3074F SYST BP LT 130 MM HG: CPT | Performed by: NURSE PRACTITIONER

## 2024-09-10 ASSESSMENT — FIBROSIS 4 INDEX: FIB4 SCORE: 1.91

## 2024-09-11 ASSESSMENT — ENCOUNTER SYMPTOMS
FATIGUE: 1
ENDOCRINE NEGATIVE: 1
EYES NEGATIVE: 1
HEMATOLOGIC/LYMPHATIC NEGATIVE: 1
COUGH: 1
ARTHRALGIAS: 1
CONSTIPATION: 1
ABDOMINAL DISTENTION: 1
CARDIOVASCULAR NEGATIVE: 1
PSYCHIATRIC NEGATIVE: 1
SEIZURES: 1

## 2024-09-11 NOTE — PROGRESS NOTES
Verbal consent was acquired by the patient to use [employer] ambient listening note generation during this visit Yes      Subjective   Nancie Joyner is a 79 y.o. female who presents for:  History of Present Illness  The patient presents for evaluation of multiple medical concerns.    She experiences urinary incontinence, often unaware of the need to urinate until she stands up. This has been a long-standing issue, leading her to use pads for several years. The incontinence can be severe, sometimes resulting in a full bladder release that soaks her pad and clothing. She has not sought medical attention for this issue before. Despite a previous referral to a urogynecologist, she did not attend the appointment.    She suffers from heartburn and takes omeprazole on an empty stomach about 30 minutes before meals. She also experiences excessive gas, for which her  provides her with gas tablets. She has constipation, with infrequent bowel movements, sometimes only once a day or not at all. She feels incomplete evacuation after bowel movements. She has not taken MiraLAX recently. Her water intake is low, often only one bottle a day.    She has limited mobility due to knee and leg issues, which prevent her from walking long distances. She occasionally uses a cane or walker for support and has a wheelchair. She is seeking a handicap parking space.    She has a lot of back pain. She cracked her spine when she was in the fourth grade in school. She coughs up phlegm all the time.    SOCIAL HISTORY  She does not drink alcohol.    FAMILY HISTORY  She has 2 sisters alive. One of her sisters  of a heart attack and the other sister had breast cancer and hardening of the lungs. She has 1 brother alive and her oldest brother  of cancer. She has 3 daughters and 1 son. Her oldest daughter has high blood pressure and diabetes. Her middle daughter has back problems and needs to have an operation on her back. Her  "youngest daughter has seizures. Her son also has seizures and epilepsy and is on Dilantin.    Review of Systems   Constitutional:  Positive for fatigue.   HENT: Negative.     Eyes: Negative.    Respiratory:  Positive for cough.    Cardiovascular: Negative.    Gastrointestinal:  Positive for abdominal distention and constipation.        Gas, GERD   Endocrine: Negative.    Genitourinary:         Urinary incontinence   Musculoskeletal:  Positive for arthralgias and gait problem.   Skin: Negative.    Allergic/Immunologic: Positive for environmental allergies.   Neurological:  Positive for seizures.   Hematological: Negative.    Psychiatric/Behavioral: Negative.       Objective   /62 (BP Location: Left arm, Patient Position: Sitting, BP Cuff Size: Adult)   Pulse 90   Temp 36.7 °C (98.1 °F) (Temporal)   Ht 1.6 m (5' 3\")   Wt 93.4 kg (206 lb)   SpO2 90%   Physical Exam  General: Normal appearing. No distress.  HEENT: Normocephalic. Eyes conjunctiva clear lids without ptosis, pupils equal and reactive to light accommodation, ears normal shape and contour.  Neck: Supple without JVD or bruit. Thyroid is not enlarged.  Pulmonary: Clear to ausculation.  Normal effort. No rales, rhonchi, or wheezing.  Cardiovascular: Regular rate and rhythm without murmur. Carotid and radial pulses are intact and equal bilaterally.  Abdomen: Soft, nontender, nondistended. Normal bowel sounds.  Neurologic: Grossly nonfocal.  Lymph: No cervical, supraclavicular or axillary lymph nodes are palpable.  Skin: Warm and dry.  No obvious lesions.  Musculoskeletal: Normal gait. No extremity cyanosis, clubbing, or edema.  Psych: Normal mood and affect. Alert and oriented x3. Judgment and insight is normal.     Results  Laboratory Studies  CBC (red blood cells, white blood cells, and platelets) normal. B12 slightly elevated. CMP (electrolytes, fasting blood sugar, kidney and liver function) normal. Thyroid function normal. Vitamin D levels " normal. LDL Cholesterol slightly high at 109.     Assessment & Plan  1. Mixed stress and urge urinary incontinence  2. Incontinence without sensory awareness  Chronic, uncontrolled. A referral to a urologist will be made for further evaluation and management of urinary incontinence. She is encouraged to monitor any changes in her urinary incontinence with the resumption of MiraLAX and improvement in constipation. Continue to use incontinence briefs/pads.  - Referral to Urology    3. Other constipation  Chronic, uncontrolled. Constipation may be contributing to her urinary incontinence and heartburn symptoms. She is advised to resume daily MiraLAX until regular bowel movements are achieved, after which she can reduce the frequency to every other day. If diarrhea occurs, she should discontinue MiraLAX for a few days. An increase in water intake to 3-4 bottles per day is recommended.     4. Gastric intestinal metaplasia  Chronic, ongoing. Heartburn may be exacerbated by constipation. She is advised to continue taking omeprazole 20 mg in the morning on an empty stomach, 30 minutes before eating. Monitoring for improvement in heartburn symptoms with the resumption of MiraLAX is recommended. If heartburn does not improve, she should notify the office.    5. Limited mobility  DMV paperwork for a handicap placard will be provided.    6. Dyslipidemia  Chronic, ongoing without statin. Encourage healthy diet and activity as tolerated.      Return in about 6 months (around 3/3/2025) for AWV.     Please note that this dictation was created using voice recognition software. I have made every reasonable attempt to correct obvious errors, but I expect that there are errors of grammar and possibly content that I did not discover before finalizing the note.

## 2024-10-08 ENCOUNTER — HOSPITAL ENCOUNTER (OUTPATIENT)
Facility: MEDICAL CENTER | Age: 79
End: 2024-10-08
Attending: PHYSICIAN ASSISTANT
Payer: MEDICARE

## 2024-10-08 ENCOUNTER — OFFICE VISIT (OUTPATIENT)
Dept: UROLOGY | Facility: MEDICAL CENTER | Age: 79
End: 2024-10-08
Payer: MEDICARE

## 2024-10-08 VITALS
TEMPERATURE: 97.9 F | WEIGHT: 206 LBS | BODY MASS INDEX: 36.5 KG/M2 | HEIGHT: 63 IN | OXYGEN SATURATION: 90 % | HEART RATE: 92 BPM | DIASTOLIC BLOOD PRESSURE: 66 MMHG | SYSTOLIC BLOOD PRESSURE: 138 MMHG

## 2024-10-08 DIAGNOSIS — N30.00 ACUTE CYSTITIS WITHOUT HEMATURIA: ICD-10-CM

## 2024-10-08 DIAGNOSIS — N95.8 GENITOURINARY SYNDROME OF MENOPAUSE: ICD-10-CM

## 2024-10-08 DIAGNOSIS — N39.46 MIXED STRESS AND URGE URINARY INCONTINENCE: ICD-10-CM

## 2024-10-08 LAB
APPEARANCE UR: NORMAL
BILIRUB UR STRIP-MCNC: NORMAL MG/DL
COLOR UR AUTO: YELLOW
GLUCOSE UR STRIP.AUTO-MCNC: NORMAL MG/DL
KETONES UR STRIP.AUTO-MCNC: NORMAL MG/DL
LEUKOCYTE ESTERASE UR QL STRIP.AUTO: NORMAL
NITRITE UR QL STRIP.AUTO: POSITIVE
PH UR STRIP.AUTO: 6 [PH] (ref 5–8)
POC POST-VOID: 38 ML
POC PRE-VOID: NORMAL
PROT UR QL STRIP: NORMAL MG/DL
RBC UR QL AUTO: NORMAL
SP GR UR STRIP.AUTO: 1.01
UROBILINOGEN UR STRIP-MCNC: 0.2 MG/DL

## 2024-10-08 PROCEDURE — 3078F DIAST BP <80 MM HG: CPT | Performed by: PHYSICIAN ASSISTANT

## 2024-10-08 PROCEDURE — 87077 CULTURE AEROBIC IDENTIFY: CPT

## 2024-10-08 PROCEDURE — 87086 URINE CULTURE/COLONY COUNT: CPT

## 2024-10-08 PROCEDURE — 99204 OFFICE O/P NEW MOD 45 MIN: CPT | Performed by: PHYSICIAN ASSISTANT

## 2024-10-08 PROCEDURE — 3075F SYST BP GE 130 - 139MM HG: CPT | Performed by: PHYSICIAN ASSISTANT

## 2024-10-08 PROCEDURE — 1170F FXNL STATUS ASSESSED: CPT | Performed by: PHYSICIAN ASSISTANT

## 2024-10-08 PROCEDURE — 81002 URINALYSIS NONAUTO W/O SCOPE: CPT | Performed by: PHYSICIAN ASSISTANT

## 2024-10-08 PROCEDURE — 87186 SC STD MICRODIL/AGAR DIL: CPT

## 2024-10-08 PROCEDURE — 51798 US URINE CAPACITY MEASURE: CPT | Performed by: PHYSICIAN ASSISTANT

## 2024-10-08 RX ORDER — ESTRADIOL 0.1 MG/G
CREAM VAGINAL
Qty: 42.5 G | Refills: 6 | Status: SHIPPED | OUTPATIENT
Start: 2024-10-08

## 2024-10-08 RX ORDER — BUDESONIDE 32 MCG
AEROSOL, SPRAY WITH PUMP (ML) NASAL
Qty: 90 TABLET | Refills: 6 | Status: SHIPPED | OUTPATIENT
Start: 2024-10-08

## 2024-10-08 RX ORDER — NITROFURANTOIN 25; 75 MG/1; MG/1
100 CAPSULE ORAL 2 TIMES DAILY
Qty: 14 CAPSULE | Refills: 0 | Status: SHIPPED | OUTPATIENT
Start: 2024-10-08

## 2024-10-08 ASSESSMENT — FIBROSIS 4 INDEX: FIB4 SCORE: 1.91

## 2024-10-10 ENCOUNTER — TELEPHONE (OUTPATIENT)
Dept: UROLOGY | Facility: MEDICAL CENTER | Age: 79
End: 2024-10-10
Payer: MEDICARE

## 2024-10-10 DIAGNOSIS — N30.00 ACUTE CYSTITIS WITHOUT HEMATURIA: ICD-10-CM

## 2024-10-10 RX ORDER — SULFAMETHOXAZOLE AND TRIMETHOPRIM 800; 160 MG/1; MG/1
1 TABLET ORAL 2 TIMES DAILY
Qty: 14 TABLET | Refills: 0 | Status: SHIPPED | OUTPATIENT
Start: 2024-10-10

## 2024-11-26 ENCOUNTER — OFFICE VISIT (OUTPATIENT)
Dept: MEDICAL GROUP | Facility: PHYSICIAN GROUP | Age: 79
End: 2024-11-26
Payer: MEDICARE

## 2024-11-26 VITALS
HEART RATE: 69 BPM | DIASTOLIC BLOOD PRESSURE: 58 MMHG | HEIGHT: 64 IN | WEIGHT: 201.6 LBS | TEMPERATURE: 96.6 F | OXYGEN SATURATION: 96 % | SYSTOLIC BLOOD PRESSURE: 118 MMHG | BODY MASS INDEX: 34.42 KG/M2

## 2024-11-26 DIAGNOSIS — H66.90 ACUTE OTITIS MEDIA, UNSPECIFIED OTITIS MEDIA TYPE: ICD-10-CM

## 2024-11-26 DIAGNOSIS — J06.9 VIRAL URI WITH COUGH: ICD-10-CM

## 2024-11-26 PROCEDURE — 1170F FXNL STATUS ASSESSED: CPT | Performed by: STUDENT IN AN ORGANIZED HEALTH CARE EDUCATION/TRAINING PROGRAM

## 2024-11-26 PROCEDURE — 99213 OFFICE O/P EST LOW 20 MIN: CPT | Performed by: STUDENT IN AN ORGANIZED HEALTH CARE EDUCATION/TRAINING PROGRAM

## 2024-11-26 PROCEDURE — 3078F DIAST BP <80 MM HG: CPT | Performed by: STUDENT IN AN ORGANIZED HEALTH CARE EDUCATION/TRAINING PROGRAM

## 2024-11-26 PROCEDURE — 3074F SYST BP LT 130 MM HG: CPT | Performed by: STUDENT IN AN ORGANIZED HEALTH CARE EDUCATION/TRAINING PROGRAM

## 2024-11-26 RX ORDER — BENZONATATE 100 MG/1
100 CAPSULE ORAL 3 TIMES DAILY PRN
Qty: 30 CAPSULE | Refills: 0 | Status: SHIPPED | OUTPATIENT
Start: 2024-11-26 | End: 2024-12-06

## 2024-11-26 ASSESSMENT — ENCOUNTER SYMPTOMS
PALPITATIONS: 0
CHILLS: 0
SHORTNESS OF BREATH: 0
FEVER: 0

## 2024-11-26 ASSESSMENT — FIBROSIS 4 INDEX: FIB4 SCORE: 1.91

## 2024-11-26 NOTE — PROGRESS NOTES
Subjective:   Verbal consent was acquired by the patient to use [employer] ambient listening note generation during this visit Yes     CC: Nasal congestion, cough, and left ear pain  History of Present Illness  Ms. Joyner is a pleasant 79-year-old established patient of Bindu Mahsa who presents today with 2-day history of a head cold and left ear pain.    She has been experiencing symptoms of a head cold, including sneezing, a runny nose, and a cough. Despite taking over-the-counter cold medicine and using Tylenol for relief, her symptoms have not improved. Her nasal discharge is typically constant, but it has recently started again. She has been producing yellowish phlegm when she coughs. She occasionally feels cold but reports no facial pain. She has been experiencing ear pain, particularly upon waking up. She reports persistent shortness of breath but no chest pain. She does not have a sore throat.    She also suffers from acid reflux and has Flonase nasal spray at home.     She received her influenza vaccine in 09/2024 and her COVID-19 booster in 10/2024.     She is currently taking nitrofurantoin and Bactrim daily for urinary infections.    ALLERGIES  She denies any allergies to antibiotics.    Patient Active Problem List    Diagnosis Date Noted    Incontinence without sensory awareness 05/18/2023    Recurrent UTI 05/18/2023    Atrophic vaginitis 05/18/2023    Ataxia 02/15/2023    Sensory neuropathy 02/15/2023    Memory loss of unknown cause 02/02/2023    S/P total knee arthroplasty, right 05/10/2022    Osteoarthritis of right knee 02/14/2022    Gastric intestinal metaplasia 12/07/2021    Other constipation 07/15/2021    Risk for falls 07/27/2020    Chronic foot pain, right 07/27/2020    Changing nevus 03/28/2019    Chronic bilateral low back pain without sciatica 03/28/2019    Dyslipidemia 03/28/2019    Morbid obesity due to excess calories (HCC) 03/28/2019    Chronic pain of both knees 10/10/2017     "Osteopenia of spine 08/01/2016    Vitamin B12 deficiency 08/01/2016    Varicose veins of both lower extremities with pain 08/01/2016    Seasonal allergies 08/01/2016    UI (urinary incontinence) 08/01/2016    Vitamin D deficiency 08/01/2016    Complex partial seizures evolving to generalized tonic-clonic seizures ([facility]CC) 08/01/2016         Health Maintenance: Completed    ROS:  Review of Systems   Constitutional:  Negative for chills and fever.   Respiratory:  Negative for shortness of breath.    Cardiovascular:  Negative for chest pain and palpitations.       Objective:     Exam:  /58 (BP Location: Right arm, Patient Position: Sitting, BP Cuff Size: Adult)   Pulse 69   Temp 35.9 °C (96.6 °F) (Temporal)   Ht 1.624 m (5' 3.94\")   Wt 91.4 kg (201 lb 9.6 oz)   SpO2 96%   BMI 34.67 kg/m²  Body mass index is 34.67 kg/m².    Physical Exam  Constitutional:       Appearance: Normal appearance.   HENT:      Right Ear: Tympanic membrane, ear canal and external ear normal.      Left Ear: Ear canal and external ear normal. Tympanic membrane is injected and erythematous.      Nose: Rhinorrhea present.   Cardiovascular:      Rate and Rhythm: Normal rate and regular rhythm.      Heart sounds: Normal heart sounds.   Pulmonary:      Effort: Pulmonary effort is normal. No respiratory distress.      Breath sounds: Normal breath sounds. No stridor. No wheezing, rhonchi or rales.   Neurological:      Mental Status: She is alert.           Assessment & Plan:     79 y.o. female with the following -     1. Viral URI with cough  Acute, ongoing.  Patient presenting with rhinorrhea and cough likely to be viral in etiology.  Will prescribe patient Tessalon Perles for cough and patient was advised to use Flonase.  - benzonatate (TESSALON) 100 MG Cap; Take 1 Capsule by mouth 3 times a day as needed for Cough for up to 10 days.  Dispense: 30 Capsule; Refill: 0    2. Acute otitis media, unspecified otitis media type  Acute, ongoing.  " Patient presenting with acute otitis media of the left ear.  Will prescribe patient Augmentin to be taken twice daily for 5 days.  - amoxicillin-clavulanate (AUGMENTIN) 875-125 MG Tab; Take 1 Tablet by mouth 2 times a day for 5 days.  Dispense: 10 Tablet; Refill: 0      Return if symptoms worsen or fail to improve.    Please note that this dictation was created using voice recognition software. I have made every reasonable attempt to correct obvious errors, but I expect that there are errors of grammar and possibly content that I did not discover before finalizing the note.

## 2025-01-27 ENCOUNTER — OFFICE VISIT (OUTPATIENT)
Dept: UROLOGY | Facility: MEDICAL CENTER | Age: 80
End: 2025-01-27
Payer: MEDICARE

## 2025-01-27 ENCOUNTER — HOSPITAL ENCOUNTER (OUTPATIENT)
Facility: MEDICAL CENTER | Age: 80
End: 2025-01-27
Attending: STUDENT IN AN ORGANIZED HEALTH CARE EDUCATION/TRAINING PROGRAM
Payer: MEDICARE

## 2025-01-27 DIAGNOSIS — N39.46 MIXED STRESS AND URGE URINARY INCONTINENCE: ICD-10-CM

## 2025-01-27 LAB
APPEARANCE UR: NORMAL
BILIRUB UR STRIP-MCNC: NORMAL MG/DL
COLOR UR AUTO: NORMAL
GLUCOSE UR STRIP.AUTO-MCNC: NORMAL MG/DL
KETONES UR STRIP.AUTO-MCNC: NORMAL MG/DL
LEUKOCYTE ESTERASE UR QL STRIP.AUTO: NORMAL
NITRITE UR QL STRIP.AUTO: POSITIVE
PH UR STRIP.AUTO: 6.5 [PH] (ref 5–8)
PROT UR QL STRIP: NORMAL MG/DL
RBC UR QL AUTO: NORMAL
SP GR UR STRIP.AUTO: 1.02
UROBILINOGEN UR STRIP-MCNC: 0.2 MG/DL

## 2025-01-27 PROCEDURE — 87086 URINE CULTURE/COLONY COUNT: CPT

## 2025-01-27 PROCEDURE — 87186 SC STD MICRODIL/AGAR DIL: CPT

## 2025-01-27 PROCEDURE — 87077 CULTURE AEROBIC IDENTIFY: CPT

## 2025-01-27 RX ORDER — SULFAMETHOXAZOLE AND TRIMETHOPRIM 800; 160 MG/1; MG/1
1 TABLET ORAL 2 TIMES DAILY
Qty: 10 TABLET | Refills: 0 | Status: SHIPPED | OUTPATIENT
Start: 2025-01-27 | End: 2025-02-01

## 2025-01-27 NOTE — PROGRESS NOTES
Patient presented for UDS today but had to be re-scheduled due to UTI. Urine culture has been ordered and empiric antibiotics have been sent to the pharmacy on file. Reschedule in 1-2 weeks for UDS    Urine culture sent  Empiric antibiotics ordered

## 2025-01-28 ENCOUNTER — TELEPHONE (OUTPATIENT)
Dept: UROLOGY | Facility: MEDICAL CENTER | Age: 80
End: 2025-01-28
Payer: MEDICARE

## 2025-01-28 NOTE — TELEPHONE ENCOUNTER
Patient's  called in and stated that he has some questions for you regarding her antibiotic that you sent on yesterday?

## 2025-01-31 ENCOUNTER — TELEPHONE (OUTPATIENT)
Dept: UROLOGY | Facility: MEDICAL CENTER | Age: 80
End: 2025-01-31
Payer: MEDICARE

## 2025-01-31 DIAGNOSIS — N30.00 ACUTE CYSTITIS WITHOUT HEMATURIA: ICD-10-CM

## 2025-01-31 NOTE — TELEPHONE ENCOUNTER
Patient  called and stated that he is concerned that wifes last day for abx is tommorow and they don't see you for another 6weeks? He would like you to call them and possibly send another Rx so they have it incase this happens again?

## 2025-02-06 ENCOUNTER — OFFICE VISIT (OUTPATIENT)
Dept: NEUROLOGY | Facility: MEDICAL CENTER | Age: 80
End: 2025-02-06
Attending: PSYCHIATRY & NEUROLOGY
Payer: MEDICARE

## 2025-02-06 VITALS
DIASTOLIC BLOOD PRESSURE: 80 MMHG | BODY MASS INDEX: 35.62 KG/M2 | HEART RATE: 102 BPM | RESPIRATION RATE: 16 BRPM | HEIGHT: 63 IN | SYSTOLIC BLOOD PRESSURE: 118 MMHG | TEMPERATURE: 97.4 F | WEIGHT: 201.06 LBS | OXYGEN SATURATION: 92 %

## 2025-02-06 DIAGNOSIS — R27.0 ATAXIA: ICD-10-CM

## 2025-02-06 DIAGNOSIS — G40.209 COMPLEX PARTIAL SEIZURES EVOLVING TO GENERALIZED TONIC-CLONIC SEIZURES (HCC): ICD-10-CM

## 2025-02-06 DIAGNOSIS — G62.9 SENSORY NEUROPATHY: ICD-10-CM

## 2025-02-06 PROCEDURE — 3074F SYST BP LT 130 MM HG: CPT | Performed by: PSYCHIATRY & NEUROLOGY

## 2025-02-06 PROCEDURE — 1170F FXNL STATUS ASSESSED: CPT | Performed by: PSYCHIATRY & NEUROLOGY

## 2025-02-06 PROCEDURE — 3079F DIAST BP 80-89 MM HG: CPT | Performed by: PSYCHIATRY & NEUROLOGY

## 2025-02-06 PROCEDURE — 99212 OFFICE O/P EST SF 10 MIN: CPT | Performed by: PSYCHIATRY & NEUROLOGY

## 2025-02-06 PROCEDURE — 99215 OFFICE O/P EST HI 40 MIN: CPT | Performed by: PSYCHIATRY & NEUROLOGY

## 2025-02-06 ASSESSMENT — PATIENT HEALTH QUESTIONNAIRE - PHQ9
SUM OF ALL RESPONSES TO PHQ QUESTIONS 1-9: 6
CLINICAL INTERPRETATION OF PHQ2 SCORE: 1
5. POOR APPETITE OR OVEREATING: 0 - NOT AT ALL

## 2025-02-06 ASSESSMENT — ENCOUNTER SYMPTOMS
TINGLING: 1
INSOMNIA: 0
SENSORY CHANGE: 1
TREMORS: 0
SEIZURES: 0
LOSS OF CONSCIOUSNESS: 0
FALLS: 1
MEMORY LOSS: 0

## 2025-02-06 ASSESSMENT — FIBROSIS 4 INDEX: FIB4 SCORE: 1.91

## 2025-02-06 NOTE — ASSESSMENT & PLAN NOTE
She is accommodating well, the workup is always come to the conclusion that this is a multifactorial process related to the neuropathy, age, but also the chronic effects of Dilantin with cerebellar vermian atrophy.

## 2025-02-06 NOTE — PROGRESS NOTES
Subjective     Nancie Joyner is a 79 y.o. 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     HPI    Seizures: Nancie has been seizure-free over the last year, she in fact has not had a seizure for several years.  She is compliant on phenytoin (no longer Dilantin) 300 mg every evening.  The drug has been well-tolerated, other than the gait difficulties (see below), she has had no problems with tremor, slurred speech, loss of appetite, dizziness, visual change, etc.  She sees a dentist twice a year, there have been no issues with gingival hyperplasia or severe periodontal disease.  Drug levels have been checked annually, her last from February, 2024, was 7.2, CMP and CBC were unremarkable, except for a persistent elevated MCV of 101 with the latter.  Vitamin D was 78 in July of last year.  He is on a vitamin D supplement.    Ataxia: Still there, she remains very cautious as she walks, she has fallen on a couple of occasions simply because she moved too fast.  At home she is hanging onto walls and sofa is, outside of the house she uses Cristh[facility].  The unsteadiness is always worse when it is darker or the surfaces she walks around even, stairs are definitely not her friend.  There is no appendicular incoordination.    Neuropathy: The tingling across the feet is still annoying, she denies burning or painful dysesthesias.  She has not been injuring her feet.  There are no sensory distortions in the hands.  There is no foot drop.  Numbness can extend up to the mid shin.  The loss of sensation in the feet means she checks the shower temperature with her hands.    Medical, surgical and family histories are reviewed, there are no new drug allergies.  She is on phenytoin 300 mg nightly, Estrace vaginal cream, Prilosec, Tylenol as  "needed, Zyrtec, vitamin D with calcium, vitamin B12, and a multivitamin.    Review of Systems   Musculoskeletal:  Positive for falls.   Neurological:  Positive for tingling and sensory change. Negative for tremors, seizures and loss of consciousness.   Psychiatric/Behavioral:  Negative for memory loss. The patient does not have insomnia.    All other systems reviewed and are negative.    Objective     /80 (BP Location: Left arm, Patient Position: Sitting, BP Cuff Size: Adult)   Pulse (!) 102   Temp 36.3 °C (97.4 °F) (Temporal)   Resp 16   Ht 1.6 m (5' 3\")   Wt 91.2 kg (201 lb 1 oz)   SpO2 92%   BMI 35.62 kg/m²      Physical Exam    She appears in no acute distress.  Her vital signs are stable.  Pulse is slightly elevated 102, rhythm is regular.  There is no malar rash or gingival hyperplasia.  There is no jaw claudication or tenderness.  The neck is supple, range of motion is full.  Cardiac evaluation reveals a regular rhythm.  There is mild bilateral pedal edema.  There is no evidence of bruising or rash.     Neurological Exam    She is fully oriented, there is no aphasia, apraxia, or inattention.    PERRLA/EOMI, there are no nystagmus, visual fields are full to finger counting on confrontation bilaterally.  Facial movements are symmetric, there is no dysarthria with the tongue and uvula midline.  Sensory exam is intact to temperature and light touch.  Jaw movements are intact.  Shoulder shrug and head rotation are symmetric.    Musculoskeletal exam reveals normal tone, there is no tremor or drift.  Strength is intact throughout, there may be a slight degree of weakness with dorsiflexion bilaterally, but at 4+-5/5.    Reflexes are absent at the ankles, trace present at the knees, biceps and triceps easily obtained.  There are no asymmetries.  The toes are downgoing bilaterally.    Gait is still notably distorted.  She looks at the ground continuously, the steppage quality and shortened stride length " are unchanged.  Station is widened.  She turns very slowly, she is always looking for something to hang onto.  Tandem walking is an impossibility.  Repetitive movements are slowed with the feet, these are intact with the upper extremities.  There is no appendicular dystaxia with any of the extremities.    Sensory exam still reveals the stocking pattern loss of vibration below the knees, temperature is gone below the midshin.  She feels only pressure in the feet.  These modalities are intact in the upper extremities.    Assessment & Plan   Assessment & Plan  Complex partial seizures evolving to generalized tonic-clonic seizures (HCC)  Clinically stable, we continue her Dilantin dosing unchanged.  They were reassured that using the generic formulation of the drug is safe to do.  Levels are on the low side but she is stable, thus there is no reason to adjust the dose further and upwards.  Liver profile is unchanged, MCV slightly elevated, but B12 and folate levels have been followed.  They know to call the office for any seizure breakthrough attacks, they understand circumstances that lower thresholds.    She stays on vitamin D, her levels have been normal, always a good thing.  There is an elevated risk of pathologic fracture due to falls in patients with epilepsy, there is also an issue with the condition itself along with the drugs that can lower vitamin D levels and increased risk of osteoporosis.  Sensory neuropathy  Stable, there has been a slight degree of progression with sensory distortion in the feet, but she is adapting well.  There is no need for medication at this time given the absence of burning or painful dysesthesias.  The tingling is annoying but not something she wishes to treat.  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    A 1 year follow-up is scheduled.    Time: 40 minutes in total spent in patient care including pre-charting, record review, discussion with healthcare staff and documentation.  This includes face-to-face time for exam, review, discussion, as well as counseling and coordinating care.

## 2025-02-06 NOTE — ASSESSMENT & PLAN NOTE
Clinically stable, we continue her Dilantin dosing unchanged.  They were reassured that using the generic formulation of the drug is safe to do.  Levels are on the low side but she is stable, thus there is no reason to adjust the dose further and upwards.  Liver profile is unchanged, MCV slightly elevated, but B12 and folate levels have been followed.  They know to call the office for any seizure breakthrough attacks, they understand circumstances that lower thresholds.    She stays on vitamin D, her levels have been normal, always a good thing.  There is an elevated risk of pathologic fracture due to falls in patients with epilepsy, there is also an issue with the condition itself along with the drugs that can lower vitamin D levels and increased risk of osteoporosis.

## 2025-02-06 NOTE — ASSESSMENT & PLAN NOTE
Stable, there has been a slight degree of progression with sensory distortion in the feet, but she is adapting well.  There is no need for medication at this time given the absence of burning or painful dysesthesias.  The tingling is annoying but not something she wishes to treat.

## 2025-02-07 DIAGNOSIS — G40.209 COMPLEX PARTIAL SEIZURES EVOLVING TO GENERALIZED TONIC-CLONIC SEIZURES (HCC): ICD-10-CM

## 2025-02-07 RX ORDER — PHENYTOIN SODIUM 100 MG/1
300 CAPSULE, EXTENDED RELEASE ORAL EVERY EVENING
Qty: 270 CAPSULE | Refills: 3 | Status: SHIPPED | OUTPATIENT
Start: 2025-02-07

## 2025-02-08 NOTE — TELEPHONE ENCOUNTER
Received request via: Pharmacy    Medication Name/Dosage Dilantin     When was medication last prescribed 2/6/24    How many refills were previously provided 3    How many Refills does he patient have left from last prescription 0    Was the patient seen in the last year in this department? Yes   Date of last office visit 2/6/25     Per last Neurology Office Visit, when was the date of next follow up visit set for?                          1 yr   Date of office visit follow up request 2/6/26     Does the patient have an upcoming appointment? Yes   If yes, when 2/3/26             If no, schedule appointment     Does the patient have [facility] and need 100 day supply (blood pressure, diabetes and cholesterol meds only)? Medication is not for blood pressure,diabetes, or cholesterol

## 2025-02-21 ENCOUNTER — HOSPITAL ENCOUNTER (OUTPATIENT)
Dept: LAB | Facility: MEDICAL CENTER | Age: 80
End: 2025-02-21
Attending: INTERNAL MEDICINE
Payer: MEDICARE

## 2025-02-21 LAB
ALBUMIN SERPL BCP-MCNC: 4.1 G/DL (ref 3.2–4.9)
ALBUMIN/GLOB SERPL: 1.4 G/DL
ALP SERPL-CCNC: 83 U/L (ref 30–99)
ALT SERPL-CCNC: 28 U/L (ref 2–50)
ANION GAP SERPL CALC-SCNC: 10 MMOL/L (ref 7–16)
AST SERPL-CCNC: 30 U/L (ref 12–45)
BILIRUB SERPL-MCNC: 0.4 MG/DL (ref 0.1–1.5)
BUN SERPL-MCNC: 13 MG/DL (ref 8–22)
CALCIUM ALBUM COR SERPL-MCNC: 8.8 MG/DL (ref 8.5–10.5)
CALCIUM SERPL-MCNC: 8.9 MG/DL (ref 8.5–10.5)
CHLORIDE SERPL-SCNC: 105 MMOL/L (ref 96–112)
CHOLEST SERPL-MCNC: 178 MG/DL (ref 100–199)
CO2 SERPL-SCNC: 27 MMOL/L (ref 20–33)
CREAT SERPL-MCNC: 0.95 MG/DL (ref 0.5–1.4)
FASTING STATUS PATIENT QL REPORTED: NORMAL
GFR SERPLBLD CREATININE-BSD FMLA CKD-EPI: 60 ML/MIN/1.73 M 2
GLOBULIN SER CALC-MCNC: 3 G/DL (ref 1.9–3.5)
GLUCOSE SERPL-MCNC: 86 MG/DL (ref 65–99)
HDLC SERPL-MCNC: 60 MG/DL
LDLC SERPL CALC-MCNC: 99 MG/DL
POTASSIUM SERPL-SCNC: 4.1 MMOL/L (ref 3.6–5.5)
PROT SERPL-MCNC: 7.1 G/DL (ref 6–8.2)
SODIUM SERPL-SCNC: 142 MMOL/L (ref 135–145)
TRIGL SERPL-MCNC: 95 MG/DL (ref 0–149)

## 2025-02-21 PROCEDURE — 80061 LIPID PANEL: CPT

## 2025-02-21 PROCEDURE — 36415 COLL VENOUS BLD VENIPUNCTURE: CPT

## 2025-02-21 PROCEDURE — 80053 COMPREHEN METABOLIC PANEL: CPT

## 2025-03-03 ENCOUNTER — HOSPITAL ENCOUNTER (OUTPATIENT)
Dept: LAB | Facility: MEDICAL CENTER | Age: 80
End: 2025-03-03
Attending: STUDENT IN AN ORGANIZED HEALTH CARE EDUCATION/TRAINING PROGRAM
Payer: MEDICARE

## 2025-03-03 DIAGNOSIS — N30.00 ACUTE CYSTITIS WITHOUT HEMATURIA: ICD-10-CM

## 2025-03-03 PROCEDURE — 87186 SC STD MICRODIL/AGAR DIL: CPT

## 2025-03-03 PROCEDURE — 87086 URINE CULTURE/COLONY COUNT: CPT

## 2025-03-03 PROCEDURE — 87077 CULTURE AEROBIC IDENTIFY: CPT | Mod: 91

## 2025-03-05 ENCOUNTER — RESULTS FOLLOW-UP (OUTPATIENT)
Dept: UROLOGY | Facility: MEDICAL CENTER | Age: 80
End: 2025-03-05
Payer: MEDICARE

## 2025-03-05 DIAGNOSIS — N30.00 ACUTE CYSTITIS WITHOUT HEMATURIA: ICD-10-CM

## 2025-03-05 RX ORDER — SULFAMETHOXAZOLE AND TRIMETHOPRIM 800; 160 MG/1; MG/1
1 TABLET ORAL 2 TIMES DAILY
Qty: 20 TABLET | Refills: 0 | Status: SHIPPED | OUTPATIENT
Start: 2025-03-05 | End: 2025-03-15

## 2025-03-10 ENCOUNTER — OFFICE VISIT (OUTPATIENT)
Dept: MEDICAL GROUP | Facility: PHYSICIAN GROUP | Age: 80
End: 2025-03-10
Payer: MEDICARE

## 2025-03-10 VITALS
SYSTOLIC BLOOD PRESSURE: 128 MMHG | HEART RATE: 80 BPM | OXYGEN SATURATION: 92 % | HEIGHT: 63 IN | TEMPERATURE: 98.1 F | DIASTOLIC BLOOD PRESSURE: 64 MMHG | BODY MASS INDEX: 35.37 KG/M2 | WEIGHT: 199.6 LBS

## 2025-03-10 DIAGNOSIS — Z00.00 MEDICARE ANNUAL WELLNESS VISIT, SUBSEQUENT: ICD-10-CM

## 2025-03-10 DIAGNOSIS — K31.A0 GASTRIC INTESTINAL METAPLASIA: ICD-10-CM

## 2025-03-10 DIAGNOSIS — E66.01 SEVERE OBESITY (HCC): ICD-10-CM

## 2025-03-10 DIAGNOSIS — J30.2 SEASONAL ALLERGIES: ICD-10-CM

## 2025-03-10 DIAGNOSIS — Z91.81 RISK FOR FALLS: ICD-10-CM

## 2025-03-10 PROBLEM — G40.909 EPILEPSY (HCC): Status: ACTIVE | Noted: 2025-01-29

## 2025-03-10 PROBLEM — K21.9 GASTROESOPHAGEAL REFLUX DISEASE: Status: ACTIVE | Noted: 2025-01-29

## 2025-03-10 PROCEDURE — 3078F DIAST BP <80 MM HG: CPT | Performed by: NURSE PRACTITIONER

## 2025-03-10 PROCEDURE — 3074F SYST BP LT 130 MM HG: CPT | Performed by: NURSE PRACTITIONER

## 2025-03-10 PROCEDURE — 1170F FXNL STATUS ASSESSED: CPT | Performed by: NURSE PRACTITIONER

## 2025-03-10 PROCEDURE — G0439 PPPS, SUBSEQ VISIT: HCPCS | Performed by: NURSE PRACTITIONER

## 2025-03-10 RX ORDER — FLUTICASONE PROPIONATE 50 MCG
SPRAY, SUSPENSION (ML) NASAL
Qty: 48 G | Refills: 3 | Status: SHIPPED | OUTPATIENT
Start: 2025-03-10

## 2025-03-10 RX ORDER — OMEPRAZOLE 20 MG/1
20 CAPSULE, DELAYED RELEASE ORAL
Qty: 90 CAPSULE | Refills: 3 | Status: SHIPPED | OUTPATIENT
Start: 2025-03-10

## 2025-03-10 ASSESSMENT — PATIENT HEALTH QUESTIONNAIRE - PHQ9: CLINICAL INTERPRETATION OF PHQ2 SCORE: 0

## 2025-03-10 ASSESSMENT — FIBROSIS 4 INDEX: FIB4 SCORE: 2.15

## 2025-03-10 ASSESSMENT — ACTIVITIES OF DAILY LIVING (ADL): BATHING_REQUIRES_ASSISTANCE: 0

## 2025-03-10 ASSESSMENT — ENCOUNTER SYMPTOMS: GENERAL WELL-BEING: FAIR

## 2025-03-10 NOTE — PROGRESS NOTES
Chief Complaint   Patient presents with    Medicare Annual Wellness   Verbal consent was acquired by the patient to use [facility] ambient listening note generation during this visit Yes      HPI:  Nancie Joyner is a 79 y.o. here for Medicare Annual Wellness Visit     History of Present Illness  The patient presents for an annual wellness visit.    She is currently in the recovery phase of a recent influenza infection, which she contracted last Tuesday. She reports a slight improvement in her condition but continues to experience persistent symptoms, including fatigue and a productive cough with phlegm. She has been managing these symptoms with DayQuil and a cough syrup prescribed during her last medical consultation. A COVID-19 test was conducted, yielding a negative result.    She is under the care of a urologist for incontinence issues, with a scheduled study on Wednesday. She is also receiving treatment for an infection.    She has a colonoscopy and EGD scheduled for 2025.    She had a consultation with Dr. Daigle, a neurologist, approximately a month ago, which did not reveal any significant changes.    She is also under the care of Dr. Austin, a cardiologist, with a follow-up appointment scheduled in 3 months. Her recent cardiac tests were within normal limits.    She experienced a fall resulting in a fracture of her little toe. During the fall, she reported a sudden loss of strength in her arms and legs, rendering her unable to lift her head off the floor. She did not sustain any head injuries from the fall.    She is on omeprazole for acid reflux and got a refill on it about a month ago.    She takes cetirizine over the counter for allergies and uses Flonase as prescribed.    FAMILY HISTORY  Her sister  from nasal cancer and was a smoker.    MEDICATIONS  Current: DayQuil, omeprazole, cetirizine, Flonase    IMMUNIZATIONS  She received her RSV vaccine.     Patient Active Problem List     Diagnosis Date Noted    Gastroesophageal reflux disease 01/29/2025    Epilepsy (HCC) 01/29/2025    Incontinence without sensory awareness 05/18/2023    Recurrent UTI 05/18/2023    Atrophic vaginitis 05/18/2023    Ataxia 02/15/2023    Sensory neuropathy 02/15/2023    Memory loss of unknown cause 02/02/2023    S/P total knee arthroplasty, right 05/10/2022    Osteoarthritis of right knee 02/14/2022    Gastric intestinal metaplasia 12/07/2021    Other constipation 07/15/2021    Risk for falls 07/27/2020    Chronic foot pain, right 07/27/2020    Changing nevus 03/28/2019    Chronic bilateral low back pain without sciatica 03/28/2019    Dyslipidemia 03/28/2019    Severe obesity (HCC) 03/28/2019    Chronic pain of both knees 10/10/2017    Osteopenia of spine 08/01/2016    Vitamin B12 deficiency 08/01/2016    Varicose veins of both lower extremities with pain 08/01/2016    Seasonal allergies 08/01/2016    UI (urinary incontinence) 08/01/2016    Vitamin D deficiency 08/01/2016    Complex partial seizures evolving to generalized tonic-clonic seizures (HCC) 08/01/2016     Current Outpatient Medications   Medication Sig Dispense Refill    omeprazole (PRILOSEC) 20 MG delayed-release capsule Take 1 Capsule by mouth every morning before breakfast. 90 Capsule 3    fluticasone (FLONASE) 50 MCG/ACT nasal spray USE 1 SPRAY IN BOTH NOSTRILS  DAILY 48 g 3    sulfamethoxazole-trimethoprim (BACTRIM DS) 800-160 MG tablet Take 1 Tablet by mouth 2 times a day for 10 days. 20 Tablet 0    phenytoin ER (DILANTIN) 100 MG Cap TAKE 3 CAPSULES BY MOUTH EVERY  EVENING 270 Capsule 3    estradiol (ESTRACE VAGINAL) 0.1 MG/GM vaginal cream Apply a pea sized (.25g) amount inside vaginal canal daily 42.5 g 6    Cranberry-Vitamin C-Probiotic (GNP CRANBERRY PLUS PROB W/VITC) 250-30 MG Tab Take one tablet daily for UTI prevention 90 Tablet 6    D-Mannose 500 MG Cap Please take one tablet daily for UTI prevention 90 Capsule 2    acetaminophen (TYLENOL) 500  MG Tab Take 2 Tablets by mouth every 6 hours as needed for Mild Pain or Moderate Pain. 30 Tablet 0    cetirizine (ZYRTEC) 10 MG Tab Take 1 Tablet by mouth every day. 90 Tablet 3    Calcium-Magnesium-Vitamin D (CALCIUM 500 PO) Take  by mouth.      Multiple Vitamin (MULTI-VITAMINS PO) Take  by mouth.      Cholecalciferol (VITAMIN D PO) Take  by mouth.      Cyanocobalamin (VITAMIN B12 PO) Take  by mouth.       No current facility-administered medications for this visit.          Current supplements as per medication list.     Allergies: Patient has no known allergies.    Current social contact/activities: she likes to watch tv      She  reports that she quit smoking about 45 years ago. Her smoking use included cigarettes. She started smoking about 48 years ago. She has a 1.1 pack-year smoking history. She has never used smokeless tobacco. She reports that she does not drink alcohol and does not use drugs.  Counseling given: Yes    ROS:    Gait: Uses a walker  Ostomy: No  Other tubes: No  Amputations: No  Chronic oxygen use: No  Last eye exam: 06/2024  Wears hearing aids: No   : Reports urinary leakage during the last 6 months that has interfered a lot with their daily activities or sleep.    Screening:    Depression Screening  Little interest or pleasure in doing things?  0 - not at all  Feeling down, depressed , or hopeless? 0 - not at all  Patient Health Questionnaire Score: 0     If depressive symptoms identified deferred to follow up visit unless specifically addressed in assessment and plan.    Interpretation of PHQ-9 Total Score   Score Severity   1-4 No Depression   5-9 Mild Depression   10-14 Moderate Depression   15-19 Moderately Severe Depression   20-27 Severe Depression    Screening for Cognitive Impairment  Do you or any of your friends or family members have any concern about your memory? Yes  Three Minute Recall (Village, Kitchen, Baby) 1/3    [place] clock face with all 12 numbers and set the hands to  show 10 minutes past 11.  Yes    Cognitive concerns identified deferred for follow up unless specifically addressed in assessment and plan.    Fall Risk Assessment  Has the patient had two or more falls in the last year or any fall with injury in the last year?  Yes    Safety Assessment  Do you always wear your seatbelt?  Yes  Any changes to home needed to function safely? Yes  Difficulty hearing.  No  Patient counseled about all safety risks that were identified.    Functional Assessment ADLs  Are there any barriers preventing you from cooking for yourself or meeting nutritional needs?  No.    Are there any barriers preventing you from driving safely or obtaining transportation?  No.    Are there any barriers preventing you from using a telephone or calling for help?  Yes    Are there any barriers preventing you from shopping?  No.    Are there any barriers preventing you from taking care of your own finances?  No    Are there any barriers preventing you from managing your medications?  No    Are there any barriers preventing you from showering, bathing or dressing yourself? No    Are there any barriers preventing you from doing housework or laundry? No  Are there any barriers preventing you from using the toilet?No  Are you currently engaging in any exercise or physical activity?  No.      Self-Assessment of Health  What is your perception of your health? Fair    Do you sleep more than six hours a night? Yes    In the past 7 days, how much did pain keep you from doing your normal work? A lot    Do you spend quality time with family or friends (virtually or in person)? Yes    Do you usually eat a heart healthy diet that constists of a variety of fruits, vegetables, whole grains and fiber? No    Do you eat foods high in fat and/or Fast Food more than three times per week? No    How concerned are you that your medical conditions are not being well managed? Not at all    Are you worried that in the next 2 months, you  may not have stable housing that you own, rent, or stay in as part of a household? No      Advance Care Planning  Do you have an Advance Directive, Living Will, Durable Power of , or POLST? Yes  Advance Directive Living Will     is not on file - instructed patient to bring in a copy to scan into their chart    Health Maintenance Summary            Upcoming       Annual Wellness Visit (Yearly) Next due on 3/10/2026      03/10/2025  Subsequent Annual Wellness Visit - Includes PPPS ()    03/10/2025  Visit Dx: Medicare annual wellness visit, subsequent    02/28/2024  Subsequent Annual Wellness Visit - Includes PPPS ()    02/28/2024  Visit Dx: Medicare annual wellness visit, subsequent    02/23/2023  Visit Dx: Medicare annual wellness visit, subsequent     Only the first 5 history entries have been loaded, but more history exists.            Bone Density Scan (Every 2 Years) Next due on 3/13/2026      03/13/2024  DS-BONE DENSITY STUDY (DEXA)    08/06/2020  DS-BONE DENSITY STUDY (DEXA)    04/04/2019  DS-BONE DENSITY STUDY (DEXA)    03/03/2017  DS-BONE DENSITY STUDY (DEXA)    11/07/2005  DS-BONE DENSITY STUDY (DEXA)      Only the first 5 history entries have been loaded, but more history exists.              IMM DTaP/Tdap/Td Vaccine (3 - Td or Tdap) Next due on 8/6/2030 08/06/2020  Imm Admin: Tdap Vaccine    07/31/2020  Imm Admin: Tdap Vaccine                      Completed or No Longer Recommended       Influenza Vaccine (Series Information) Completed      09/26/2024  Imm Admin: Influenza high-dose trivalent (PF)    10/19/2023  Imm Admin: Influenza Vaccine Adult HD    10/13/2022  Imm Admin: Influenza Vaccine Adult HD    10/07/2021  Imm Admin: Influenza Vaccine Adult HD    10/08/2020  Imm Admin: Influenza Vaccine Adult HD      Only the first 5 history entries have been loaded, but more history exists.              Zoster (Shingles) Vaccines (Series Information) Completed      03/13/2020  Imm Admin:  Zoster Vaccine Recombinant (RZV) (SHINGRIX)    11/12/2019  Imm Admin: Zoster Vaccine Recombinant (RZV) (SHINGRIX)    11/30/2016  Imm Admin: Zoster Vaccine Live (ZVL) (Zostavax) - HISTORICAL DATA              COVID-19 Vaccine (Series Information) Completed      09/26/2024  Imm Admin: Covid-19 Mrna (Spikevax) Moderna 12+ Years    09/08/2022  Imm Admin: PFIZER BIVALENT SARS-COV-2 VACCINE (12+)    06/15/2022  Imm Admin: PFIZER ORTIZ CAP SARS-COV-2 VACCINATION (12+)    09/30/2021  Imm Admin: PFIZER PURPLE CAP SARS-COV-2 VACCINATION (12+)    02/12/2021  Imm Admin: PFIZER PURPLE CAP SARS-COV-2 VACCINATION (12+)      Only the first 5 history entries have been loaded, but more history exists.              Pneumococcal Vaccine: 50+ Years (Series Information) Completed      03/28/2019  Imm Admin: Pneumococcal polysaccharide vaccine (PPSV-23)    11/30/2016  Imm Admin: Pneumococcal Conjugate Vaccine (Prevnar/PCV-13)              Hepatitis A Vaccine (Hep A) (Series Information) Aged Out      No completion history exists for this topic.              Hepatitis B Vaccine (Hep B) (Series Information) Aged Out     No completion history exists for this topic.              HPV Vaccines (Series Information) Aged Out     No completion history exists for this topic.              Polio Vaccine (Inactivated Polio) (Series Information) Aged Out     No completion history exists for this topic.              Meningococcal Immunization (Series Information) Aged Out     No completion history exists for this topic.              Mammogram  Discontinued        Frequency changed to Never automatically (Topic No Longer Applies)    09/01/2021  MA-SCREENING MAMMO BILAT W/TOMOSYNTHESIS W/CAD    11/25/2019  MA-SCREENING MAMMO BILAT W/TOMOSYNTHESIS W/CAD    11/06/2018  MA-SCREENING MAMMO BILAT W/TOMOSYNTHESIS W/CAD    07/28/2016  MA-SCREEN MAMMO W/CAD-BILAT     Only the first 5 history entries have been loaded, but more history exists.            Colorectal  Cancer Screening  Discontinued        Frequency changed to Never automatically (Topic No Longer Applies)    2021  Colonoscopy (Done)    2014  REFERRAL TO GI FOR COLONOSCOPY              Hepatitis C Screening  Discontinued        Frequency changed to Never automatically (Topic No Longer Applies)    2023  Hepatitis C Antibody component of HEP C VIRUS ANTIBODY                          Patient Care Team:  MOUNA Lopez as PCP - General (Family Medicine)  Miki Fofana M.D. as Consulting Physician (Neurology)  Scott Miller M.D. (Urology)  Abi Austin M.D. (Cardiovascular Disease (Cardiology))    Social History     Tobacco Use    Smoking status: Former     Current packs/day: 0.00     Average packs/day: 0.5 packs/day for 2.2 years (1.1 ttl pk-yrs)     Types: Cigarettes     Start date: 1977     Quit date: 3/28/1979     Years since quittin.9    Smokeless tobacco: Never   Vaping Use    Vaping status: Never Used   Substance Use Topics    Alcohol use: No     Alcohol/week: 0.0 oz    Drug use: No     Family History   Problem Relation Age of Onset    Cancer Mother         STOMACH    Lung Cancer Father     Cancer Sister         sinus    Alzheimer's Disease Sister     Heart Disease Sister     Cancer Sister     Heart Disease Sister     Breast Cancer Sister     Cancer Brother         pancreatic cancer    Seizures Paternal Aunt     No Known Problems Maternal Grandmother     No Known Problems Maternal Grandfather     No Known Problems Paternal Grandmother     No Known Problems Paternal Grandfather     Hypertension Daughter     Diabetes Daughter     Arthritis Daughter     Seizures Daughter     Seizures Son      She  has a past medical history of Arthritis, Basal cell carcinoma (), Breath shortness, CKD (chronic kidney disease) stage 3, GFR 30-59 ml/min (2016), Dental disorder, Epilepsy (HCC) (2016), Heart burn, Osteopenia (2016), Pneumonia (), Seasonal allergies  "(8/1/2016), Seizure disorder (HCC), UI (urinary incontinence) (8/1/2016), Varicose veins (8/1/2016), Vitamin B12 deficiency (8/1/2016), and Vitamin D deficiency (8/1/2016).   Past Surgical History:   Procedure Laterality Date    PB TOTAL KNEE ARTHROPLASTY Right 5/10/2022    Procedure: Right total knee arthroplasty;  Surgeon: Jam Lowe M.D.;  Location: SURGERY [facility];  Service: Orthopedics    CHOLECYSTECTOMY  1989    TUBAL COAGULATION LAPAROSCOPIC BILATERAL  1979    ABDOMINAL HYSTERECTOMY TOTAL      VEIN STRIPPING       Exam:   /64 (BP Location: Right arm, Patient Position: Sitting, BP Cuff Size: Adult)   Pulse 80   Temp 36.7 °C (98.1 °F) (Temporal)   Ht 1.6 m (5' 3\")   Wt 90.5 kg (199 lb 9.6 oz)   SpO2 92%  Body mass index is 35.36 kg/m².    Hearing good.    Dentition upper dentures  Alert, oriented in no acute distress.  Eye contact is good, speech goal directed, affect calm    General: Normal appearing. No distress.  HEENT: Normocephalic. Eyes conjunctiva clear lids without ptosis, pupils equal and reactive to light accommodation, ears normal shape and contour, canals are clear bilaterally, tympanic membranes are benign, nasal mucosa benign, oropharynx is without erythema, edema or exudates. Sinuses (frontal and maxillary) nontender to palpation.  Neck: Supple without JVD or bruit. Thyroid is not enlarged.  Pulmonary: Clear to ausculation.  Normal effort. No rales, rhonchi, or wheezing.  Cardiovascular: Regular rate and rhythm without murmur. Carotid and radial pulses are intact and equal bilaterally.  Abdomen: Soft, nontender, nondistended. Normal bowel sounds.   Neurologic: Grossly nonfocal.  Lymph: No cervical, supraclavicular or axillary lymph nodes are palpable.  Skin: Warm and dry.  No obvious lesions.  Musculoskeletal: Normal gait. No extremity cyanosis, clubbing, or edema.  Psych: Normal mood and affect. Alert and oriented x3. Judgment and insight is normal.     Assessment and Plan. The " following treatment and monitoring plan is recommended:    1. Medicare annual wellness visit, subsequent  Her blood pressure readings are within the normal range. She is not due for a bone density scan until March of next year. She received her RSV vaccine.  - Subsequent Annual Wellness Visit - Includes PPPS ()    2. Severe obesity (HCC)  3. BMI 35.0-35.9,adult  Chronic, ongoing.   Encourage diet high in fruits, vegetables, and fiber. And a diet low in salt, refined carbohydrates, cholesterol, saturated fat, and trans fatty acids.    Encourage a minimum of 30 minutes of moderate intensity aerobic exercise (eg, brisk walking) is recommended on five days each week. Or 30 minutes of vigorous-intensity aerobic exercise (eg, jogging) on three days each week.   Patient's body mass index is 35.36 kg/m². Exercise and nutrition counseling were performed at this visit.  - Patient identified as having weight management issue.  Appropriate orders and counseling given.    4. Gastric intestinal metaplasia  Chronic, ongoing. Continue to follow with gastroenterology. Continue omeprazole 20 mg daily, refill sent to pharmacy.   - omeprazole (PRILOSEC) 20 MG delayed-release capsule; Take 1 Capsule by mouth every morning before breakfast.  Dispense: 90 Capsule; Refill: 3    5. Seasonal allergies  Chronic, ongoing. Continue Flonase nasal spray, refill sent to pharmacy. Continue cetirizine 10 mg daily.  - fluticasone (FLONASE) 50 MCG/ACT nasal spray; USE 1 SPRAY IN BOTH NOSTRILS  DAILY  Dispense: 48 g; Refill: 3    6. Risk for falls  - Patient identified as fall risk.  Appropriate orders and counseling given.      Services suggested: No services needed at this time  Health Care Screening: Age-appropriate preventive services recommended by USPTF and ACIP covered by Medicare were discussed today. Services ordered if indicated and agreed upon by the patient.  Referrals offered: Community-based lifestyle interventions to reduce health  risks and promote self-management and wellness, fall prevention, nutrition, physical activity, tobacco-use cessation, weight loss, and mental health services as per orders if indicated.    Discussion today about general wellness and lifestyle habits:    Prevent falls and reduce trip hazards; Cautioned about securing or removing rugs.  Have a working fire alarm and carbon monoxide detector;   Engage in regular physical activity and social activities     Follow-up: Return in 1 year (on 3/10/2026) for AWV.    Please note that this dictation was created using voice recognition software. I have worked with consultants from the vendor as well as technical experts from [facility] to optimize the interface. I have made every reasonable attempt to correct obvious errors, but I expect that there are errors of grammar and possibly content that I did not discover before finalizing the note.

## 2025-03-12 ENCOUNTER — PROCEDURE VISIT (OUTPATIENT)
Dept: UROLOGY | Facility: MEDICAL CENTER | Age: 80
End: 2025-03-12
Payer: MEDICARE

## 2025-03-12 DIAGNOSIS — N39.46 MIXED STRESS AND URGE URINARY INCONTINENCE: ICD-10-CM

## 2025-03-12 LAB
APPEARANCE UR: CLEAR
BILIRUB UR STRIP-MCNC: NORMAL MG/DL
COLOR UR AUTO: YELLOW
GLUCOSE UR STRIP.AUTO-MCNC: NORMAL MG/DL
KETONES UR STRIP.AUTO-MCNC: NORMAL MG/DL
LEUKOCYTE ESTERASE UR QL STRIP.AUTO: NORMAL
NITRITE UR QL STRIP.AUTO: NORMAL
PH UR STRIP.AUTO: 6 [PH] (ref 5–8)
PROT UR QL STRIP: NORMAL MG/DL
RBC UR QL AUTO: NORMAL
SP GR UR STRIP.AUTO: 1.01
UROBILINOGEN UR STRIP-MCNC: 1 MG/DL

## 2025-03-12 PROCEDURE — 81002 URINALYSIS NONAUTO W/O SCOPE: CPT | Performed by: STUDENT IN AN ORGANIZED HEALTH CARE EDUCATION/TRAINING PROGRAM

## 2025-03-12 PROCEDURE — 51729 CYSTOMETROGRAM W/VP&UP: CPT | Performed by: STUDENT IN AN ORGANIZED HEALTH CARE EDUCATION/TRAINING PROGRAM

## 2025-03-12 PROCEDURE — 51741 ELECTRO-UROFLOWMETRY FIRST: CPT | Performed by: STUDENT IN AN ORGANIZED HEALTH CARE EDUCATION/TRAINING PROGRAM

## 2025-03-12 PROCEDURE — 51784 ANAL/URINARY MUSCLE STUDY: CPT | Performed by: STUDENT IN AN ORGANIZED HEALTH CARE EDUCATION/TRAINING PROGRAM

## 2025-03-13 NOTE — PROGRESS NOTES
Subjective  Nancie Joyner is a 79 y.o. female who presents today for urodynamics to evaluate Overactive bladder and urge urinary incontinence    Family History   Problem Relation Age of Onset    Cancer Mother         STOMACH    Lung Cancer Father     Cancer Sister         sinus    Alzheimer's Disease Sister     Heart Disease Sister     Cancer Sister     Heart Disease Sister     Breast Cancer Sister     Cancer Brother         pancreatic cancer    Seizures Paternal Aunt     No Known Problems Maternal Grandmother     No Known Problems Maternal Grandfather     No Known Problems Paternal Grandmother     No Known Problems Paternal Grandfather     Hypertension Daughter     Diabetes Daughter     Arthritis Daughter     Seizures Daughter     Seizures Son        Social History     Socioeconomic History    Marital status:      Spouse name: Cristhian    Number of children: Not on file    Years of education: Not on file    Highest education level: Not on file   Occupational History    Not on file   Tobacco Use    Smoking status: Former     Current packs/day: 0.00     Average packs/day: 0.5 packs/day for 2.2 years (1.1 ttl pk-yrs)     Types: Cigarettes     Start date: 1977     Quit date: 3/28/1979     Years since quittin.9    Smokeless tobacco: Never   Vaping Use    Vaping status: Never Used   Substance and Sexual Activity    Alcohol use: No     Alcohol/week: 0.0 oz    Drug use: No    Sexual activity: Not Currently     Partners: Male   Other Topics Concern    Not on file   Social History Narrative    Not on file     Social Drivers of Health     Financial Resource Strain: Not on file   Food Insecurity: Not on file   Transportation Needs: Not on file   Physical Activity: Not on file   Stress: Not on file   Social Connections: Not on file   Intimate Partner Violence: Not on file   Housing Stability: Not on file       Past Surgical History:   Procedure Laterality Date    PB TOTAL KNEE ARTHROPLASTY Right  5/10/2022    Procedure: Right total knee arthroplasty;  Surgeon: Jam Lowe M.D.;  Location: SURGERY [facility];  Service: Orthopedics    CHOLECYSTECTOMY  1989    TUBAL COAGULATION LAPAROSCOPIC BILATERAL  1979    ABDOMINAL HYSTERECTOMY TOTAL      VEIN STRIPPING         Past Medical History:   Diagnosis Date    Arthritis     hands, knees, lower back    Basal cell carcinoma 2015    left eyelid    Breath shortness     CKD (chronic kidney disease) stage 3, GFR 30-59 ml/min 8/1/2016    Dental disorder     left lower permanent bridge. upper dentures    Epilepsy (HCC) 8/1/2016    Heart burn     acid reflux    Osteopenia 8/1/2016    Pneumonia 2015    Seasonal allergies 8/1/2016    Seizure disorder (HCC)     UI (urinary incontinence) 8/1/2016    Varicose veins 8/1/2016    Vitamin B12 deficiency 8/1/2016    Vitamin D deficiency 8/1/2016       Current Outpatient Medications   Medication Sig Dispense Refill    omeprazole (PRILOSEC) 20 MG delayed-release capsule Take 1 Capsule by mouth every morning before breakfast. 90 Capsule 3    fluticasone (FLONASE) 50 MCG/ACT nasal spray USE 1 SPRAY IN BOTH NOSTRILS  DAILY 48 g 3    sulfamethoxazole-trimethoprim (BACTRIM DS) 800-160 MG tablet Take 1 Tablet by mouth 2 times a day for 10 days. 20 Tablet 0    phenytoin ER (DILANTIN) 100 MG Cap TAKE 3 CAPSULES BY MOUTH EVERY  EVENING 270 Capsule 3    estradiol (ESTRACE VAGINAL) 0.1 MG/GM vaginal cream Apply a pea sized (.25g) amount inside vaginal canal daily 42.5 g 6    Cranberry-Vitamin C-Probiotic (GNP CRANBERRY PLUS PROB W/VITC) 250-30 MG Tab Take one tablet daily for UTI prevention 90 Tablet 6    D-Mannose 500 MG Cap Please take one tablet daily for UTI prevention 90 Capsule 2    acetaminophen (TYLENOL) 500 MG Tab Take 2 Tablets by mouth every 6 hours as needed for Mild Pain or Moderate Pain. 30 Tablet 0    cetirizine (ZYRTEC) 10 MG Tab Take 1 Tablet by mouth every day. 90 Tablet 3    Calcium-Magnesium-Vitamin D (CALCIUM 500 PO) Take   by mouth.      Multiple Vitamin (MULTI-VITAMINS PO) Take  by mouth.      Cholecalciferol (VITAMIN D PO) Take  by mouth.      Cyanocobalamin (VITAMIN B12 PO) Take  by mouth.       No current facility-administered medications for this visit.       No Known Allergies    Objective  There were no vitals taken for this visit.  Physical Exam  Constitutional:       Appearance: Normal appearance.   HENT:      Head: Normocephalic and atraumatic.   Pulmonary:      Effort: Pulmonary effort is normal.   Genitourinary:     Comments: Vaginal atrophy with retracted urethral meatus  Skin:     General: Skin is warm and dry.   Neurological:      General: No focal deficit present.      Mental Status: She is alert.   Psychiatric:         Mood and Affect: Mood normal.         Behavior: Behavior normal.         Labs:     POC UA  Lab Results   Component Value Date/Time    POCCOLOR yellow 03/12/2025 02:54 PM    POCAPPEAR clear 03/12/2025 02:54 PM    POCLEUKEST neg 03/12/2025 02:54 PM    POCNITRITE neg 03/12/2025 02:54 PM    POCUROBILIGE 1.0 03/12/2025 02:54 PM    POCPROTEIN neg 03/12/2025 02:54 PM    POCURPH 6.0 03/12/2025 02:54 PM    POCBLOOD neg 03/12/2025 02:54 PM    POCSPGRV 1.015 03/12/2025 02:54 PM    POCKETONES neg 03/12/2025 02:54 PM    POCBILIRUBIN neg 03/12/2025 02:54 PM    POCGLUCUA neg 03/12/2025 02:54 PM        A1C  Lab Results   Component Value Date/Time    HBA1C 5.8 (H) 11/03/2015 0626    AVGLUC 120 11/03/2015 0626       Imaging: none    Procedure    Urodynamics     The patient presents for urodynamics test and to develop an optimized plan for long-term bladder management. A timeout was performed prior to starting. The patient was in the seated position.     Urodyamics Report:      A multichannel urodynamics test was performed after the bladder was catheterized and emptied. A filling urethral catheter and a rectal balloon was placed as well as EMG patch electrodes on the perineum. The starting fill rate was 50 ml/min.      Sensation:     -1st sensation: 137 ml     -1st desire: leakage occurred before threshold was achieved     -strong desire: leakage occurred before threshold was achieved     -bladder capacity: leakage occurred before threshold was achieved     Detrusor Function:     Involuntary Detrusor Contractions: observed    Occur at infused volume of : 206 ml, 287 mL    Peak pressure of IDC: 21 cmH20    Associated with leakage: yes    Frequency of IDCs: Terminal     Compliance: Normal- less than 10 cmH20 rise in detrusor pressures during filling.     Sphincteric function:      Provocative maneuvers were performed with an infused volumes of 30, 100, and 200 ml.      Stress incontinence noted at: 206 cc    Stress induced detrusor overactivity noted at: 206 mL     Leakage was large volume    EMG:      Normal guarding reflex; no evidence of detrusor sphincter dyssynergia    Voiding Phase:     Voiding was: Patient had large volume incontinence prior to attempting to void    All catheters were removed, the patient tolerated the procedure well.     Assessment  Urodynamics Diagnoses: Detrusor overactivity and urge urinary incontinence    Plan for bladder management:     We reviewed the treatment options for overactive bladder including bladder training, avoidance of bladder irritants (such as caffeinated beverages, spicy food, citrus, tomatoes, etc), reduction of fluid intake 2 hours before bed, constipation management ensuring one soft bowel movement per day, pelvic floor physical therapy, medical therapy (Trospium, Detrol, Myrbetriq, etc), cystoscopy with bladder botox injections, and sacral neuromodulation placement.  The patient would like to proceed with bladder botox injections.    Plan    1. Mixed stress and urge urinary incontinence  - URODYNAMIC STUDIES  - POCT Urinalysis    RTC for cystoscopy with bladder botox injections.

## 2025-03-18 ENCOUNTER — TELEPHONE (OUTPATIENT)
Dept: UROLOGY | Facility: MEDICAL CENTER | Age: 80
End: 2025-03-18
Payer: MEDICARE

## 2025-03-18 NOTE — TELEPHONE ENCOUNTER
Medicare as primary, no auth needed.     Patient scheduled for 4/23/25.     Your order indicated UACx 1 week prior. Please place order so that patient can present to lab.     Thank you

## 2025-03-18 NOTE — TELEPHONE ENCOUNTER
----- Message from Physician Scott Miller M.D. sent at 3/12/2025  5:10 PM PDT -----  Regarding: procedure  Admit diagnosis/ICD-10: overactive bladder with urge urinary incontinence N39.41    Surgical procedure/CPT:  cystoscopy with bladder botox injections 100 units [id number]    Special instrument request: laborie needle, botox 100 units    Time needed: 30 minutes    Anesthesia: local     Location: clinic    Admit status: outpatient    Labs: urine culture one week prior    Clearance needed:   none    Assist (Please specify MD/SRINIVAS): none    Post OP/Follow Up: follow up 3 weeks later with PVR    Special notes: none

## 2025-03-26 DIAGNOSIS — N39.46 MIXED STRESS AND URGE URINARY INCONTINENCE: ICD-10-CM

## 2025-03-26 NOTE — PROGRESS NOTES
I have ordered UA and Urine culture for patient to complete for botox injections with Dr Dinero. She will need to complete this one week before surgery.    Botox scheduled for 04/23 with Paul

## 2025-03-27 NOTE — TELEPHONE ENCOUNTER
Phone Number Called: [phone]      Call outcome: Spoke to patient regarding message below.    Message: patient is aware that there is an order for her to get the UA culture done before her appt.    NSR with  T wave flattening in V5 and V6.; QTc 488

## 2025-04-14 ENCOUNTER — HOSPITAL ENCOUNTER (OUTPATIENT)
Dept: LAB | Facility: MEDICAL CENTER | Age: 80
End: 2025-04-14
Payer: MEDICARE

## 2025-04-14 DIAGNOSIS — N39.46 MIXED STRESS AND URGE URINARY INCONTINENCE: ICD-10-CM

## 2025-04-14 LAB
APPEARANCE UR: CLEAR
BILIRUB UR QL STRIP.AUTO: NEGATIVE
COLOR UR: YELLOW
GLUCOSE UR STRIP.AUTO-MCNC: NEGATIVE MG/DL
KETONES UR STRIP.AUTO-MCNC: NEGATIVE MG/DL
LEUKOCYTE ESTERASE UR QL STRIP.AUTO: NEGATIVE
MICRO URNS: NORMAL
NITRITE UR QL STRIP.AUTO: NEGATIVE
PH UR STRIP.AUTO: 6.5 [PH] (ref 5–8)
PROT UR QL STRIP: NEGATIVE MG/DL
RBC UR QL AUTO: NEGATIVE
SP GR UR STRIP.AUTO: 1.01
UROBILINOGEN UR STRIP.AUTO-MCNC: 1 EU/DL

## 2025-04-14 PROCEDURE — 81003 URINALYSIS AUTO W/O SCOPE: CPT

## 2025-04-14 PROCEDURE — 87086 URINE CULTURE/COLONY COUNT: CPT

## 2025-04-16 LAB
BACTERIA UR CULT: NORMAL
SIGNIFICANT IND 70042: NORMAL
SITE SITE: NORMAL
SOURCE SOURCE: NORMAL

## 2025-04-23 ENCOUNTER — PROCEDURE VISIT (OUTPATIENT)
Dept: UROLOGY | Facility: MEDICAL CENTER | Age: 80
End: 2025-04-23
Payer: MEDICARE

## 2025-04-23 DIAGNOSIS — N39.46 MIXED STRESS AND URGE URINARY INCONTINENCE: ICD-10-CM

## 2025-04-23 DIAGNOSIS — N32.81 OVERACTIVE BLADDER: ICD-10-CM

## 2025-04-23 LAB
APPEARANCE UR: CLEAR
BILIRUB UR STRIP-MCNC: NORMAL MG/DL
COLOR UR AUTO: YELLOW
GLUCOSE UR STRIP.AUTO-MCNC: NORMAL MG/DL
KETONES UR STRIP.AUTO-MCNC: NORMAL MG/DL
LEUKOCYTE ESTERASE UR QL STRIP.AUTO: NORMAL
NITRITE UR QL STRIP.AUTO: NORMAL
PH UR STRIP.AUTO: 6 [PH] (ref 5–8)
PROT UR QL STRIP: NORMAL MG/DL
RBC UR QL AUTO: NORMAL
SP GR UR STRIP.AUTO: 1.02
UROBILINOGEN UR STRIP-MCNC: 0.2 MG/DL

## 2025-04-23 PROCEDURE — 81002 URINALYSIS NONAUTO W/O SCOPE: CPT | Performed by: STUDENT IN AN ORGANIZED HEALTH CARE EDUCATION/TRAINING PROGRAM

## 2025-04-23 RX ORDER — SULFAMETHOXAZOLE AND TRIMETHOPRIM 800; 160 MG/1; MG/1
1 TABLET ORAL ONCE
Status: COMPLETED | OUTPATIENT
Start: 2025-04-23 | End: 2025-04-23

## 2025-04-23 RX ADMIN — Medication 20 ML: at 13:10

## 2025-04-23 RX ADMIN — SULFAMETHOXAZOLE AND TRIMETHOPRIM 1 TABLET: 800; 160 TABLET ORAL at 16:45

## 2025-04-23 NOTE — PROGRESS NOTES
Subjective  Nancie Joyner is a 79 y.o. female who presents today for cystoscopy and bladder botox injections for OAB and urge incontinence .     Family History   Problem Relation Age of Onset    Cancer Mother         STOMACH    Lung Cancer Father     Cancer Sister         sinus    Alzheimer's Disease Sister     Heart Disease Sister     Cancer Sister     Heart Disease Sister     Breast Cancer Sister     Cancer Brother         pancreatic cancer    Seizures Paternal Aunt     No Known Problems Maternal Grandmother     No Known Problems Maternal Grandfather     No Known Problems Paternal Grandmother     No Known Problems Paternal Grandfather     Hypertension Daughter     Diabetes Daughter     Arthritis Daughter     Seizures Daughter     Seizures Son        Social History     Socioeconomic History    Marital status:      Spouse name: Cristhian    Number of children: Not on file    Years of education: Not on file    Highest education level: Not on file   Occupational History    Not on file   Tobacco Use    Smoking status: Former     Current packs/day: 0.00     Average packs/day: 0.5 packs/day for 2.2 years (1.1 ttl pk-yrs)     Types: Cigarettes     Start date: 1977     Quit date: 3/28/1979     Years since quittin.1    Smokeless tobacco: Never   Vaping Use    Vaping status: Never Used   Substance and Sexual Activity    Alcohol use: No     Alcohol/week: 0.0 oz    Drug use: No    Sexual activity: Not Currently     Partners: Male   Other Topics Concern    Not on file   Social History Narrative    Not on file     Social Drivers of Health     Financial Resource Strain: Not on file   Food Insecurity: Not on file   Transportation Needs: Not on file   Physical Activity: Not on file   Stress: Not on file   Social Connections: Not on file   Intimate Partner Violence: Not on file   Housing Stability: Not on file       Past Surgical History:   Procedure Laterality Date    PB TOTAL KNEE ARTHROPLASTY Right  5/10/2022    Procedure: Right total knee arthroplasty;  Surgeon: Jam Lowe M.D.;  Location: SURGERY [facility];  Service: Orthopedics    CHOLECYSTECTOMY  1989    TUBAL COAGULATION LAPAROSCOPIC BILATERAL  1979    ABDOMINAL HYSTERECTOMY TOTAL      VEIN STRIPPING         Past Medical History:   Diagnosis Date    Arthritis     hands, knees, lower back    Basal cell carcinoma 2015    left eyelid    Breath shortness     CKD (chronic kidney disease) stage 3, GFR 30-59 ml/min 8/1/2016    Dental disorder     left lower permanent bridge. upper dentures    Epilepsy (HCC) 8/1/2016    Heart burn     acid reflux    Osteopenia 8/1/2016    Pneumonia 2015    Seasonal allergies 8/1/2016    Seizure disorder (HCC)     UI (urinary incontinence) 8/1/2016    Varicose veins 8/1/2016    Vitamin B12 deficiency 8/1/2016    Vitamin D deficiency 8/1/2016       Current Outpatient Medications   Medication Sig Dispense Refill    omeprazole (PRILOSEC) 20 MG delayed-release capsule Take 1 Capsule by mouth every morning before breakfast. 90 Capsule 3    fluticasone (FLONASE) 50 MCG/ACT nasal spray USE 1 SPRAY IN BOTH NOSTRILS  DAILY 48 g 3    phenytoin ER (DILANTIN) 100 MG Cap TAKE 3 CAPSULES BY MOUTH EVERY  EVENING 270 Capsule 3    estradiol (ESTRACE VAGINAL) 0.1 MG/GM vaginal cream Apply a pea sized (.25g) amount inside vaginal canal daily 42.5 g 6    Cranberry-Vitamin C-Probiotic (GNP CRANBERRY PLUS PROB W/VITC) 250-30 MG Tab Take one tablet daily for UTI prevention 90 Tablet 6    D-Mannose 500 MG Cap Please take one tablet daily for UTI prevention 90 Capsule 2    acetaminophen (TYLENOL) 500 MG Tab Take 2 Tablets by mouth every 6 hours as needed for Mild Pain or Moderate Pain. 30 Tablet 0    cetirizine (ZYRTEC) 10 MG Tab Take 1 Tablet by mouth every day. 90 Tablet 3    Calcium-Magnesium-Vitamin D (CALCIUM 500 PO) Take  by mouth.      Multiple Vitamin (MULTI-VITAMINS PO) Take  by mouth.      Cholecalciferol (VITAMIN D PO) Take  by mouth.       Cyanocobalamin (VITAMIN B12 PO) Take  by mouth.       No current facility-administered medications for this visit.       No Known Allergies    Objective  There were no vitals taken for this visit.  Physical Exam  Constitutional:       Appearance: Normal appearance.   HENT:      Head: Normocephalic and atraumatic.   Pulmonary:      Effort: Pulmonary effort is normal.   Skin:     General: Skin is warm and dry.   Neurological:      General: No focal deficit present.      Mental Status: She is alert.   Psychiatric:         Mood and Affect: Mood normal.         Behavior: Behavior normal.         Labs:     POC UA  Lab Results   Component Value Date/Time    POCCOLOR yellow 04/23/2025 12:56 PM    POCAPPEAR clear 04/23/2025 12:56 PM    POCLEUKEST trace 04/23/2025 12:56 PM    POCNITRITE neg 04/23/2025 12:56 PM    POCUROBILIGE 0.2 04/23/2025 12:56 PM    POCPROTEIN neg 04/23/2025 12:56 PM    POCURPH 6.0 04/23/2025 12:56 PM    POCBLOOD neg 04/23/2025 12:56 PM    POCSPGRV 1.025 04/23/2025 12:56 PM    POCKETONES neg 04/23/2025 12:56 PM    POCBILIRUBIN neg 04/23/2025 12:56 PM    POCGLUCUA neg 04/23/2025 12:56 PM      A1C  Lab Results   Component Value Date/Time    HBA1C 5.8 (H) 11/03/2015 0626    AVGLUC 120 11/03/2015 0626       Imaging: none    Procedure    Procedure performed: Cystoscopy with chemodenervation of the bladder    Surgeon: Dr. Scott Miller    Indications For Procedure: OAB and urge incontinence    Blood Loss: 0 cc     Anesthesia: Lidocaine jelly     Specimen: none     Findings:     1. Urethra: no lesions or masses    2. Bladder: no lesions or masses, no stones, no trabeculations    3. Bilateral ureteral jets observed with clear efflux bilaterally      Description of Procedure: The patient was prepped and draped in the usual sterile fashion.  A 17Fr flexible cystoscope was advanced along the urethra into the bladder under direct vision.  We performed a thorough cystoscopic evaluation patient's bladder including  retroflexion, findings noted above. 100 units of bladder botox in 10 cc was injected in 5 locations in the bladder at the dome, right and left lateral walls, and the posterior bladder wall. We removed the cystoscope under direct vision to evaluate the urethra, findings noted above.  This concluded the procedure, the patient tolerated it well.     Assessment  The patient was instructed to call our office if she develops any signs of infection including increased frequency, urgency, dysuria, fever, or chills.  We discussed the results of the cystoscopy with the patient, all questions and concerns addressed. She was given a prophylactic dose of Bactirm.    Plan    1. Mixed stress and urge urinary incontinence  - POCT Urinalysis    2. Overactive bladder    Other orders  - onabotulinum toxin type A (Botox) injection 100 Units  - lidocaine (Xylocaine) 1 % injection 20 mL    RTC 3 weeks for PVR

## 2025-04-25 ENCOUNTER — OFFICE VISIT (OUTPATIENT)
Dept: URGENT CARE | Facility: PHYSICIAN GROUP | Age: 80
End: 2025-04-25
Payer: MEDICARE

## 2025-04-25 ENCOUNTER — HOSPITAL ENCOUNTER (OUTPATIENT)
Dept: RADIOLOGY | Facility: MEDICAL CENTER | Age: 80
End: 2025-04-25
Attending: NURSE PRACTITIONER
Payer: MEDICARE

## 2025-04-25 VITALS
SYSTOLIC BLOOD PRESSURE: 124 MMHG | HEART RATE: 112 BPM | WEIGHT: 200.4 LBS | TEMPERATURE: 97.1 F | RESPIRATION RATE: 24 BRPM | DIASTOLIC BLOOD PRESSURE: 72 MMHG | BODY MASS INDEX: 35.51 KG/M2 | HEIGHT: 63 IN | OXYGEN SATURATION: 94 %

## 2025-04-25 DIAGNOSIS — R07.9 CHEST PAIN, UNSPECIFIED TYPE: ICD-10-CM

## 2025-04-25 PROCEDURE — 3078F DIAST BP <80 MM HG: CPT | Performed by: NURSE PRACTITIONER

## 2025-04-25 PROCEDURE — 3074F SYST BP LT 130 MM HG: CPT | Performed by: NURSE PRACTITIONER

## 2025-04-25 PROCEDURE — 1170F FXNL STATUS ASSESSED: CPT | Performed by: NURSE PRACTITIONER

## 2025-04-25 PROCEDURE — 99214 OFFICE O/P EST MOD 30 MIN: CPT | Performed by: NURSE PRACTITIONER

## 2025-04-25 PROCEDURE — 71046 X-RAY EXAM CHEST 2 VIEWS: CPT

## 2025-04-25 ASSESSMENT — FIBROSIS 4 INDEX: FIB4 SCORE: 2.15

## 2025-04-25 NOTE — PROGRESS NOTES
Verbal consent was acquired by the patient to use [employer] ambient listening note generation during this visit          Chief Complaint   Patient presents with    Chest Pain     Starting last night          History of Present Illness  The patient is an 79-year-old female who presents for evaluation of chest pain.    She reports experiencing chest pain upon respiration, which started the previous night. A sensation of tightness in the chest is described, and it is difficult to take deep breaths due to the associated pain. She also mentions a history of acid reflux and allergies, for which an allergy pill was taken last night.    She had some injections on her bladder for Botox on 04/23/2025.         ROS:    No severe shortness of breath   No cardiac like chest pain, as discussed   As otherwise stated in HPI    Medical/SX/ Social History:  Reviewed per chart    Pertinent Medications:    Current Outpatient Medications on File Prior to Visit   Medication Sig Dispense Refill    omeprazole (PRILOSEC) 20 MG delayed-release capsule Take 1 Capsule by mouth every morning before breakfast. 90 Capsule 3    fluticasone (FLONASE) 50 MCG/ACT nasal spray USE 1 SPRAY IN BOTH NOSTRILS  DAILY 48 g 3    phenytoin ER (DILANTIN) 100 MG Cap TAKE 3 CAPSULES BY MOUTH EVERY  EVENING 270 Capsule 3    estradiol (ESTRACE VAGINAL) 0.1 MG/GM vaginal cream Apply a pea sized (.25g) amount inside vaginal canal daily 42.5 g 6    D-Mannose 500 MG Cap Please take one tablet daily for UTI prevention 90 Capsule 2    acetaminophen (TYLENOL) 500 MG Tab Take 2 Tablets by mouth every 6 hours as needed for Mild Pain or Moderate Pain. 30 Tablet 0    cetirizine (ZYRTEC) 10 MG Tab Take 1 Tablet by mouth every day. 90 Tablet 3    Calcium-Magnesium-Vitamin D (CALCIUM 500 PO) Take  by mouth.      Multiple Vitamin (MULTI-VITAMINS PO) Take  by mouth.      Cholecalciferol (VITAMIN D PO) Take  by mouth.      Cyanocobalamin (VITAMIN B12 PO) Take  by mouth.       Cranberry-Vitamin C-Probiotic (GNP CRANBERRY PLUS PROB W/VITC) 250-30 MG Tab Take one tablet daily for UTI prevention (Patient not taking: Reported on 4/25/2025) 90 Tablet 6     No current facility-administered medications on file prior to visit.        Allergies:    Patient has no known allergies.     Problem list, medications, and allergies reviewed by myself today in Epic     Physical Exam:    Vitals:    04/25/25 0900   BP: 124/72   Pulse: (!) 112   Resp: (!) 24   Temp: 36.2 °C (97.1 °F)   SpO2: 94%             Physical Exam  Vitals and nursing note reviewed.   Constitutional:       General: She is not in acute distress.     Appearance: Normal appearance. She is not ill-appearing or toxic-appearing.   HENT:      Head: Normocephalic and atraumatic.      Nose: Nose normal.      Mouth/Throat:      Mouth: Mucous membranes are moist.      Pharynx: Oropharynx is clear.   Eyes:      Extraocular Movements: Extraocular movements intact.      Conjunctiva/sclera: Conjunctivae normal.      Pupils: Pupils are equal, round, and reactive to light.   Cardiovascular:      Rate and Rhythm: Regular rhythm. Tachycardia present.      Pulses: Normal pulses.      Heart sounds: Normal heart sounds.   Pulmonary:      Effort: Pulmonary effort is normal.      Breath sounds: Normal breath sounds.   Musculoskeletal:         General: Normal range of motion.      Cervical back: Normal range of motion and neck supple.   Skin:     General: Skin is warm.      Capillary Refill: Capillary refill takes less than 2 seconds.   Neurological:      General: No focal deficit present.      Mental Status: She is alert and oriented to person, place, and time.            Diagnostics:      RADIOLOGY RESULTS   DX-CHEST-2 VIEWS  Result Date: 4/25/2025 4/25/2025 9:39 AM HISTORY/REASON FOR EXAM:  Cough. TECHNIQUE/EXAM DESCRIPTION AND NUMBER OF VIEWS: Two views of the chest. COMPARISON:  12/31/2018 FINDINGS: The heart is normal in size. No pulmonary infiltrates or  consolidations are noted. No pleural effusions are appreciated.     No active disease.           EKG Interpretation   Ordered and interpreted by KEREN Gao  Rhythm: Sinus tachycardia  Rate: 108 normal   Axis: normal   Ectopy: none   Conduction: normal   ST Segments: no acute change   T Waves: no acute change   Q Waves: none   Clinical Impression: Sinus tachycardia      Medical Decision making and plan :  I personally reviewed prior external notes and test results pertinent to today's visit. Pt is clinically stable at today's acute urgent care visit.  Patient appears nontoxic with no acute distress noted. Appropriate for outpatient care at this time.    Pleasant 79 y.o. female presented clinic with:     Assessment & Plan  1. Chest pain.  - Reports significant pain when breathing and a sensation of tightness in the chest since last night.  - EKG results are within normal limits; physical examination reveals no abnormal heart or lung sounds.  - Discussed the potential causes including viral inflammation of the chest wall and acid reflux; advised that further evaluation may be necessary.  - A chest x-ray was negative for infiltration or other abnormalities.   - Discussed further evaluation in ER as cannot rule out cardiac source with a single EKG.  Patient defers ER at this time, prefers to monitor symptoms at home, but promises to go to ER for any worsening.  Discussed risks of this including death, and she and her  verbalized understanding.        Shared decision-making was utilized with patient for treatment plan. Medication discussed included indication for use and the potential benefits and side effects. Education was provided regarding the aforementioned assessments.  Differential Diagnosis, natural history, and supportive care discussed. All of the patient's questions were answered to their satisfaction at the time of discharge. Patient was encouraged to monitor symptoms closely. Those signs and  symptoms which would warrant concern and mandate seeking a higher level of service through the emergency department discussed at length.  Patient stated agreement and understanding of this plan of care.    Disposition:  Home in stable condition     Voice Recognition Disclaimer:  Portions of this document were created using voice recognition software and [employer] technology provided by [employer]. The software does have a chance of producing errors of grammar and possibly content. I have made every reasonable attempt to correct obvious errors, but there may be errors of grammar and possibly content that I did not discover before finalizing the  documentation.    HUSSEIN Gill.

## 2025-05-07 NOTE — PROGRESS NOTES
Subjective:   Nancie Joyner is a 72 y.o. female here today for allergies, epilepsy, pain with urination    Seasonal allergies  Ongoing issues; patient currently on Flonase daily and reports no symptom issues at this time; denies any nasal pain or change in smell with use of medication    Epilepsy  Ongoing issues; patient reports that she is doing well with Dilantin 500 mg daily; as well as it was neurology and no medication changes were made. Recent Dilantin level was subtherapeutic but she has not had any issues since her last appointment.    Dysuria  New Problem. Patient reports that she has had pain with urination in the last 3 days; she is also had some mild suprapubic pain; she denies any fevers or chills; denies any nausea or vomiting. Did take over-the-counter Azo which was only mildly beneficial.     Patient is present with her     Current medicines (including changes today)  Current Outpatient Prescriptions   Medication Sig Dispense Refill   • ciprofloxacin (CIPRO) 500 MG Tab Take 1 Tab by mouth 2 times a day for 7 days. 14 Tab 0   • fluticasone (FLONASE) 50 MCG/ACT nasal spray INHALE 1 OR 2 SPRAYS INTO NOSE EVERY DAY 16 g 7   • Calcium-Magnesium-Vitamin D (CALCIUM 500 PO) Take  by mouth.     • phenytoin ER (DILANTIN) 100 MG Cap Take 5 Caps by mouth every day. 450 Cap 1   • Multiple Vitamin (MULTI-VITAMINS PO) Take  by mouth.     • Cholecalciferol (VITAMIN D PO) Take  by mouth.     • Cyanocobalamin (VITAMIN B12 PO) Take  by mouth.     • cyclobenzaprine (FLEXERIL) 5 MG tablet Take 1-2 Tabs by mouth 3 times a day as needed. 60 Tab 0     No current facility-administered medications for this visit.      She  has a past medical history of CKD (chronic kidney disease) stage 3, GFR 30-59 ml/min (8/1/2016); Epilepsy (CMS-HCC) (8/1/2016); Osteopenia (8/1/2016); Seasonal allergies (8/1/2016); Seizure disorder (CMS-HCC); UI (urinary incontinence) (8/1/2016); Varicose veins (8/1/2016); Vitamin B12  "deficiency (8/1/2016); and Vitamin D deficiency (8/1/2016).    ROS   No chest pain, no shortness of breath, no headache, no fever, no chills  + Pain with urination; suprapubic pain     Objective:     Blood pressure 126/74, pulse 82, temperature 36.2 °C (97.2 °F), height 1.6 m (5' 3\"), weight 89.8 kg (198 lb), SpO2 92 %. Body mass index is 35.07 kg/m².   Physical Exam:  Alert, oriented in no acute distress.  Eye contact is good, speech goal directed, affect calm  HEENT: conjunctiva non-injected, sclera non-icteric.  Pinna normal. Oral mucous membranes pink and moist with no lesions.  Neck No adenopathy or masses in the neck or supraclavicular regions.  Lungs: clear to auscultation bilaterally with good excursion.  CV: regular rate and rhythm. Mild systolic ejection murmur  Abdomen: soft, nontender, No CVAT; no suprapubic pain on exam  Ext: no edema, color normal, vascularity normal, temperature normal        Assessment and Plan:   The following treatment plan was discussed     1. Acute cystitis without hematuria      Uncontrolled; prescription for Cipro 500 mg one tab by mouth twice a day ×7 days; monitor for tolerance and effect   2. Dysuria      Uncontrolled; symptoms secondary to problem #1   3. Seasonal allergic rhinitis due to pollen, unspecified chronicity  fluticasone (FLONASE) 50 MCG/ACT nasal spray    Stable. Continue Flonase; refill provided   4. Other epilepsy without status epilepticus, not intractable (CMS-HCC)      Stable. Continue current medications; continue follow-up with neurology as directed   5. AK (actinic keratosis)  REFERRAL TO DERMATOLOGY    Patient requesting referral to dermatology for skin evaluation       Followup: Return in about 6 months (around 10/17/2018) for check up, Short.            " DISPLAY PLAN FREE TEXT DISPLAY PLAN FREE TEXT

## 2025-05-14 ENCOUNTER — APPOINTMENT (OUTPATIENT)
Dept: UROLOGY | Facility: MEDICAL CENTER | Age: 80
End: 2025-05-14
Payer: MEDICARE

## 2025-05-14 DIAGNOSIS — N32.81 OVERACTIVE BLADDER: Primary | ICD-10-CM

## 2025-05-14 DIAGNOSIS — N39.46 MIXED STRESS AND URGE URINARY INCONTINENCE: ICD-10-CM

## 2025-05-14 LAB
POC POST-VOID: 81 ML
POC PRE-VOID: NORMAL

## 2025-05-14 PROCEDURE — 1170F FXNL STATUS ASSESSED: CPT | Performed by: STUDENT IN AN ORGANIZED HEALTH CARE EDUCATION/TRAINING PROGRAM

## 2025-05-14 PROCEDURE — 51798 US URINE CAPACITY MEASURE: CPT | Performed by: STUDENT IN AN ORGANIZED HEALTH CARE EDUCATION/TRAINING PROGRAM

## 2025-05-14 PROCEDURE — 99213 OFFICE O/P EST LOW 20 MIN: CPT | Performed by: STUDENT IN AN ORGANIZED HEALTH CARE EDUCATION/TRAINING PROGRAM

## 2025-05-23 ENCOUNTER — TELEPHONE (OUTPATIENT)
Dept: UROLOGY | Facility: MEDICAL CENTER | Age: 80
End: 2025-05-23

## 2025-05-23 NOTE — TELEPHONE ENCOUNTER
----- Message from Physician Scott Miller M.D. sent at 5/14/2025  2:15 PM PDT -----  Regarding: botox  Admit diagnosis/ICD-10: overactive bladder, urge incontinence N32.81Surgical procedure/CPT:  Cystoscopy with injection of bladder botox 100 units [id number] instrument request: 100 units of botoxTime needed: 30 minutesAnesthesia: local Location: clinicAdmit status: outpatientLabs: urinalysis day ofClearance needed:   noneAssist (Please specify MD/SRINIVAS): nonePost OP/Follow Up: follow up 3 weeks after procedure with PVRSpecial notes: schedule in 5 months

## 2025-05-23 NOTE — LETTER
July 16, 2025        Nancie oJyner  [address]  [address]  [place] [address]        Dear Nancie,     Our office has attempted to contact you regarding an appointment for a Cysto with Botox injections here in our office. If you are still interested in having this procedure done in office with Dr Miller, please contact us at your earliest convenience at [phone].       Sincerely,    Scott Miller M.D.    Electronically Signed

## 2025-06-11 ENCOUNTER — OFFICE VISIT (OUTPATIENT)
Dept: MEDICAL GROUP | Facility: PHYSICIAN GROUP | Age: 80
End: 2025-06-11
Payer: MEDICARE

## 2025-06-11 VITALS
TEMPERATURE: 97.9 F | BODY MASS INDEX: 35.21 KG/M2 | OXYGEN SATURATION: 93 % | WEIGHT: 198.7 LBS | SYSTOLIC BLOOD PRESSURE: 112 MMHG | HEART RATE: 71 BPM | HEIGHT: 63 IN | DIASTOLIC BLOOD PRESSURE: 62 MMHG

## 2025-06-11 DIAGNOSIS — G89.29 CHRONIC RIGHT-SIDED LOW BACK PAIN WITH RIGHT-SIDED SCIATICA: Primary | ICD-10-CM

## 2025-06-11 DIAGNOSIS — M54.41 CHRONIC RIGHT-SIDED LOW BACK PAIN WITH RIGHT-SIDED SCIATICA: Primary | ICD-10-CM

## 2025-06-11 PROCEDURE — 99213 OFFICE O/P EST LOW 20 MIN: CPT | Performed by: NURSE PRACTITIONER

## 2025-06-11 PROCEDURE — 1170F FXNL STATUS ASSESSED: CPT | Performed by: NURSE PRACTITIONER

## 2025-06-11 PROCEDURE — 3074F SYST BP LT 130 MM HG: CPT | Performed by: NURSE PRACTITIONER

## 2025-06-11 PROCEDURE — 3078F DIAST BP <80 MM HG: CPT | Performed by: NURSE PRACTITIONER

## 2025-06-11 RX ORDER — FUROSEMIDE 20 MG/1
TABLET ORAL
COMMUNITY
Start: 2025-06-05

## 2025-06-11 ASSESSMENT — FIBROSIS 4 INDEX: FIB4 SCORE: 2.15

## 2025-06-15 ASSESSMENT — ENCOUNTER SYMPTOMS
BACK PAIN: 1
SLEEP DISTURBANCE: 1

## 2025-06-16 NOTE — PROGRESS NOTES
"Verbal consent was acquired by the patient to use HTP ambient listening note generation during this visit Yes      Subjective   Nancie Joyner is a 79 y.o. female who presents for:  History of Present Illness  The patient presents for evaluation of back pain.    She reports a history of receiving injections at a spine clinic, which have not provided any relief. Additionally, she has undergone therapy without any improvement in her condition. Her back pain has been progressively worsening, particularly affecting her entire lower back. She experiences pain during sleep, which radiates down her right leg, indicative of sciatic nerve involvement. The pain is so severe that it limits her mobility, preventing her from walking long distances. Even standing for short periods, such as while doing dishes, triggers intense back pain, necessitating her to sit down. This chronic pain has been a persistent issue for many years. She has not had any recent falls since breaking her shoulder approximately 1.5 years ago. She expresses concern that if her condition does not improve, she may require a wheelchair.    PAST SURGICAL HISTORY:  Knee surgery  Shoulder surgery    Review of Systems   Musculoskeletal:  Positive for back pain and gait problem.   Psychiatric/Behavioral:  Positive for sleep disturbance.    All other systems reviewed and are negative.    Objective   /62 (BP Location: Left arm, Patient Position: Sitting, BP Cuff Size: Adult)   Pulse 71   Temp 36.6 °C (97.9 °F) (Temporal)   Ht 1.6 m (5' 3\")   Wt 90.1 kg (198 lb 11.2 oz)   SpO2 93%   Physical Exam  General: Well nourished, well developed female in NAD, awake and conversant.  Eyes: Normal conjunctiva, anicteric.  Round symmetrical pupils.  ENT: Hearing grossly intact.  No nasal discharge.  Neck: Neck is supple.  No masses or thyromegaly.  CV: No lower extremity edema.  Respiratory: Respirations are nonlabored.  No wheezing.  Abdomen: " Non-Distended.  Skin: Warm.  No rashes or ulcers.  MSK: Antalgic unsteady gait.  No clubbing or cyanosis.  Neuro: Sensation and CN II-XII grossly normal.  Psych: Alert and oriented.  Cooperative, appropriate mood and affect, normal judgment.      Assessment & Plan  1. Chronic right-sided low back pain with right-sided sciatica (Primary)  New to examiner, chronic for patient. Worsening lower back pain radiating down the right leg, especially at night. Increased pain and limited mobility affecting daily activities. Referral to [facility] ([facility]) initiated for further evaluation and management. Advised to contact [facility] if no communication is received within a week.  - Referral to Orthopedics     Return if symptoms worsen or fail to improve.     Please note that this dictation was created using voice recognition software. I have made every reasonable attempt to correct obvious errors, but I expect that there are errors of grammar and possibly content that I did not discover before finalizing the note.

## 2025-06-18 NOTE — Clinical Note
REFERRAL APPROVAL NOTICE         Sent on June 18, 2025                   Nancie Joyner  [address]  [address]  [place] [address]                   Dear Ms. Joyner,    After a careful review of the medical information and benefit coverage, [facility] has processed your referral. See below for additional details.    If applicable, you must be actively enrolled with your insurance for coverage of the authorized service. If you have any questions regarding your coverage, please contact your insurance directly.    REFERRAL INFORMATION   Referral #:  [id number]  Referred-To Department    Referred-By Provider:  Orthopedics    MOUNA Lopez   [facility]      [address] [place] [address]  [phone] [address] [facility] [address]  [phone]    Referral Start Date:  06/11/2025  Referral End Date:   06/11/2026             SCHEDULING  If you do not already have an appointment, please call [phone] to make an appointment.     MORE INFORMATION  If you do not already have a [employer] account, sign up at: [email or web address]  You can access your medical information, make appointments, see lab results, billing information, and more.  If you have questions regarding this referral, please contact  the [facility] Referrals department at:             [phone]. Monday - Friday 8:00AM - 5:00PM.     Sincerely,    [facility]

## 2025-06-27 ENCOUNTER — HOSPITAL ENCOUNTER (OUTPATIENT)
Dept: LAB | Facility: MEDICAL CENTER | Age: 80
End: 2025-06-27
Attending: INTERNAL MEDICINE
Payer: MEDICARE

## 2025-06-27 LAB
ANION GAP SERPL CALC-SCNC: 11 MMOL/L (ref 7–16)
BUN SERPL-MCNC: 20 MG/DL (ref 8–22)
CALCIUM SERPL-MCNC: 8.8 MG/DL (ref 8.5–10.5)
CHLORIDE SERPL-SCNC: 102 MMOL/L (ref 96–112)
CO2 SERPL-SCNC: 24 MMOL/L (ref 20–33)
CREAT SERPL-MCNC: 0.86 MG/DL (ref 0.5–1.4)
GFR SERPLBLD CREATININE-BSD FMLA CKD-EPI: 67 ML/MIN/1.73 M 2
GLUCOSE SERPL-MCNC: 90 MG/DL (ref 65–99)
POTASSIUM SERPL-SCNC: 4.5 MMOL/L (ref 3.6–5.5)
SODIUM SERPL-SCNC: 137 MMOL/L (ref 135–145)

## 2025-06-27 PROCEDURE — 80048 BASIC METABOLIC PNL TOTAL CA: CPT

## 2025-06-27 PROCEDURE — 36415 COLL VENOUS BLD VENIPUNCTURE: CPT

## 2025-07-10 NOTE — TELEPHONE ENCOUNTER
Called patient, no answer. 3rd call. Left final message to call back and schedule procedure in October.

## 2025-08-18 ENCOUNTER — OFFICE VISIT (OUTPATIENT)
Dept: UROLOGY | Facility: MEDICAL CENTER | Age: 80
End: 2025-08-18
Payer: MEDICARE

## 2025-08-18 DIAGNOSIS — N32.81 OVERACTIVE BLADDER: Primary | ICD-10-CM

## 2025-08-18 LAB
POC POST-VOID: 111 ML
POC PRE-VOID: NORMAL

## 2025-08-18 PROCEDURE — 51798 US URINE CAPACITY MEASURE: CPT | Performed by: STUDENT IN AN ORGANIZED HEALTH CARE EDUCATION/TRAINING PROGRAM

## 2025-08-18 PROCEDURE — 1170F FXNL STATUS ASSESSED: CPT | Performed by: STUDENT IN AN ORGANIZED HEALTH CARE EDUCATION/TRAINING PROGRAM

## 2025-08-18 PROCEDURE — 99213 OFFICE O/P EST LOW 20 MIN: CPT | Performed by: STUDENT IN AN ORGANIZED HEALTH CARE EDUCATION/TRAINING PROGRAM

## 2025-08-19 ENCOUNTER — TELEPHONE (OUTPATIENT)
Dept: UROLOGY | Facility: MEDICAL CENTER | Age: 80
End: 2025-08-19
Payer: MEDICARE

## 2025-08-20 ENCOUNTER — TELEPHONE (OUTPATIENT)
Dept: UROLOGY | Facility: MEDICAL CENTER | Age: 80
End: 2025-08-20
Payer: MEDICARE

## 2025-08-21 ENCOUNTER — TELEPHONE (OUTPATIENT)
Dept: UROLOGY | Facility: MEDICAL CENTER | Age: 80
End: 2025-08-21
Payer: MEDICARE

## 2025-08-24 ENCOUNTER — HOSPITAL ENCOUNTER (OUTPATIENT)
Dept: RADIOLOGY | Facility: MEDICAL CENTER | Age: 80
End: 2025-08-24
Attending: ANESTHESIOLOGY
Payer: MEDICARE

## 2025-08-24 DIAGNOSIS — M54.16 LUMBAR RADICULOPATHY: ICD-10-CM

## 2025-08-24 PROCEDURE — 72148 MRI LUMBAR SPINE W/O DYE: CPT

## (undated) DEVICE — ELECTRODE 850 FOAM ADHESIVE - HYDROGEL RADIOTRNSPRNT (50/PK)

## (undated) DEVICE — TIP INTPLS HFLO ML ORFC BTRY - (12/CS)  FOR SURGILAV

## (undated) DEVICE — CHLORAPREP 26 ML APPLICATOR - ORANGE TINT(25/CA)

## (undated) DEVICE — BAG SPONGE COUNT 10.25 X 32 - BLUE (250/CA)

## (undated) DEVICE — GLOVE BIOGEL INDICATOR SZ 8 SURGICAL PF LTX - (50/BX 4BX/CA)

## (undated) DEVICE — SUTURE 1 VICRYL PLUS CT-1 - 18 INCH (12/BX)

## (undated) DEVICE — BANDAGE ELASTIC STERILE VELCRO 6 X 5 YDS (25EA/CA)

## (undated) DEVICE — MASK ANESTHESIA ADULT  - (100/CA)

## (undated) DEVICE — PROTECTOR ULNA NERVE - (36PR/CA)

## (undated) DEVICE — BLADE SAGITTAL SYSTEM 18MM

## (undated) DEVICE — SYS BN CMNT HI VAC KT MXR BWL - (MIX-E-VAC II)  (10EA/CA)

## (undated) DEVICE — HUMID-VENT HEAT AND MOISTURE EXCHANGE- (50/BX)

## (undated) DEVICE — STOCKINETTE IMPERVIOUS 12X48 - STERILELF (10/CA)"

## (undated) DEVICE — GOWN WARMING STANDARD FLEX - (30/CA)

## (undated) DEVICE — STAPLER SKIN DISP - (6/BX 10BX/CA) VISISTAT

## (undated) DEVICE — SUCTION INSTRUMENT YANKAUER BULBOUS TIP W/O VENT (50EA/CA)

## (undated) DEVICE — GLOVE BIOGEL SZ 7 SURGICAL PF LTX - (50PR/BX 4BX/CA)

## (undated) DEVICE — DRESSING AQUACEL AG ADVANTAGE 3.5 X 10" (10EA/BX)"

## (undated) DEVICE — Device

## (undated) DEVICE — ELECTRODE DUAL RETURN W/ CORD - (50/PK)

## (undated) DEVICE — TUBE CONNECTING SUCTION - CLEAR PLASTIC STERILE 72 IN (50EA/CA)

## (undated) DEVICE — GLOVE SZ 7.5 LF PROTEXIS (50PR/BX)

## (undated) DEVICE — SODIUM CHL. IRRIGATION 0.9% 3000ML (4EA/CA 65CA/PF)

## (undated) DEVICE — HANDPIECE 10FT INTPLS SCT PLS IRRIGATION HAND CONTROL SET (6/PK)

## (undated) DEVICE — GLOVE BIOGEL INDICATOR SZ 7.5 SURGICAL PF LTX - (50PR/BX 4BX/CA)

## (undated) DEVICE — NEPTUNE 4 PORT MANIFOLD - (20/PK)

## (undated) DEVICE — SUTURE 1 VICRYL PLUS CTX - 8 X 18 INCH (12/BX)

## (undated) DEVICE — BLADE RECIPROCATING 12.7 X 78.7 X 1.0MM (1/EA)

## (undated) DEVICE — PIN FIXATION STAINLESS STEEL FLUTE SQUARE L3.5 IN OD1/8 IN KNEE HEADLESS STERILE

## (undated) DEVICE — GLOVE, LITE (PAIR)

## (undated) DEVICE — PACK TOTAL KNEE  (1/CA)

## (undated) DEVICE — WATER IRRIGATION STERILE 1000ML (12EA/CA)

## (undated) DEVICE — SENSOR SPO2 NEO LNCS ADHESIVE (20/BX) SEE USER NOTES

## (undated) DEVICE — LENS/HOOD FOR SPACESUIT - (32/PK) PEEL AWAY FACE

## (undated) DEVICE — CANISTER SUCTION RIGID RED 1500CC (40EA/CA)

## (undated) DEVICE — KIT ANESTHESIA W/CIRCUIT & 3/LT BAG W/FILTER (20EA/CA)

## (undated) DEVICE — SUTURE GENERAL

## (undated) DEVICE — SUTURE 2-0 MONOCRYL SH&UR-6 27 - (12/BX)

## (undated) DEVICE — SODIUM CHL IRRIGATION 0.9% 1000ML (12EA/CA)

## (undated) DEVICE — HEAD HOLDER JUNIOR/ADULT

## (undated) DEVICE — TOWEL STOP TIMEOUT SAFETY FLAG (40EA/CA)

## (undated) DEVICE — LACTATED RINGERS INJ 1000 ML - (14EA/CA 60CA/PF)